# Patient Record
Sex: MALE | Race: WHITE | Employment: FULL TIME | URBAN - METROPOLITAN AREA
[De-identification: names, ages, dates, MRNs, and addresses within clinical notes are randomized per-mention and may not be internally consistent; named-entity substitution may affect disease eponyms.]

---

## 2017-01-09 ENCOUNTER — TELEPHONE (OUTPATIENT)
Dept: INTERNAL MEDICINE CLINIC | Age: 57
End: 2017-01-09

## 2017-01-10 ENCOUNTER — TELEPHONE (OUTPATIENT)
Dept: INTERNAL MEDICINE CLINIC | Age: 57
End: 2017-01-10

## 2017-01-10 DIAGNOSIS — R91.8 PULMONARY NODULES: Primary | ICD-10-CM

## 2017-01-11 LAB
BUN BLDV-MCNC: 14 MG/DL
CALCIUM SERPL-MCNC: 10.1 MG/DL
CHLORIDE BLD-SCNC: 97 MMOL/L
CO2: 26 MMOL/L
CREAT SERPL-MCNC: 0.84 MG/DL
GFR CALCULATED: 98
GLUCOSE BLD-MCNC: 286 MG/DL
HEMOGLOBIN: 16.5 G/DL (ref 13.5–17.5)
POTASSIUM SERPL-SCNC: 4.5 MMOL/L
SODIUM BLD-SCNC: 137 MMOL/L

## 2017-01-16 ENCOUNTER — INITIAL CONSULT (OUTPATIENT)
Dept: PULMONOLOGY | Age: 57
End: 2017-01-16

## 2017-01-16 VITALS
HEIGHT: 72 IN | SYSTOLIC BLOOD PRESSURE: 128 MMHG | HEART RATE: 91 BPM | DIASTOLIC BLOOD PRESSURE: 88 MMHG | OXYGEN SATURATION: 96 % | BODY MASS INDEX: 31.69 KG/M2 | WEIGHT: 234 LBS | RESPIRATION RATE: 18 BRPM

## 2017-01-16 DIAGNOSIS — Z80.1 FAMILY HISTORY OF LUNG CANCER: ICD-10-CM

## 2017-01-16 DIAGNOSIS — R91.8 MULTIPLE LUNG NODULES: Primary | ICD-10-CM

## 2017-01-16 DIAGNOSIS — R59.0 HILAR ADENOPATHY: ICD-10-CM

## 2017-01-16 LAB
DLCO %PRED: NORMAL
DLCO PRE: NORMAL
FEF 25-75%-POST: 1.47
FEF 25-75%-PRE: 3.17
FEV1-POST: 2.02
FEV1-PRE: 3.95
FEV1/FVC-POST: 47.6
FEV1/FVC-PRE: 74.1
FVC-POST: 4.24
FVC-PRE: 5.32
MEP: NORMAL
MIP: NORMAL
TLC %PRED: NORMAL
TLC PRE: NORMAL

## 2017-01-16 PROCEDURE — 99204 OFFICE O/P NEW MOD 45 MIN: CPT | Performed by: INTERNAL MEDICINE

## 2017-01-16 PROCEDURE — 94060 EVALUATION OF WHEEZING: CPT | Performed by: INTERNAL MEDICINE

## 2017-01-16 ASSESSMENT — PULMONARY FUNCTION TESTS
FEV1_POST: 2.02
FVC_POST: 4.24
FEV1/FVC_POST: 47.6
FEV1/FVC_PRE: 74.1
FEV1_PRE: 3.95
FVC_PRE: 5.32

## 2017-01-20 ENCOUNTER — TELEPHONE (OUTPATIENT)
Dept: INTERNAL MEDICINE CLINIC | Age: 57
End: 2017-01-20

## 2017-01-23 ENCOUNTER — TELEPHONE (OUTPATIENT)
Dept: PULMONOLOGY | Age: 57
End: 2017-01-23

## 2017-02-06 ENCOUNTER — OFFICE VISIT (OUTPATIENT)
Dept: INTERNAL MEDICINE CLINIC | Age: 57
End: 2017-02-06

## 2017-02-06 VITALS
DIASTOLIC BLOOD PRESSURE: 68 MMHG | WEIGHT: 233 LBS | HEART RATE: 76 BPM | SYSTOLIC BLOOD PRESSURE: 112 MMHG | BODY MASS INDEX: 31.6 KG/M2 | RESPIRATION RATE: 16 BRPM

## 2017-02-06 DIAGNOSIS — E11.9 TYPE 2 DIABETES MELLITUS WITHOUT COMPLICATION, WITHOUT LONG-TERM CURRENT USE OF INSULIN (HCC): ICD-10-CM

## 2017-02-06 DIAGNOSIS — M54.50 ACUTE RIGHT-SIDED LOW BACK PAIN WITHOUT SCIATICA: Primary | ICD-10-CM

## 2017-02-06 DIAGNOSIS — I10 ESSENTIAL HYPERTENSION: ICD-10-CM

## 2017-02-06 DIAGNOSIS — E78.2 MIXED HYPERLIPIDEMIA: ICD-10-CM

## 2017-02-06 PROCEDURE — 99213 OFFICE O/P EST LOW 20 MIN: CPT | Performed by: INTERNAL MEDICINE

## 2017-02-06 RX ORDER — SILDENAFIL 100 MG/1
100 TABLET, FILM COATED ORAL PRN
COMMUNITY
End: 2017-07-06 | Stop reason: SDUPTHER

## 2017-02-06 RX ORDER — CYCLOBENZAPRINE HCL 10 MG
10 TABLET ORAL 2 TIMES DAILY PRN
Qty: 30 TABLET | Refills: 0 | Status: SHIPPED | OUTPATIENT
Start: 2017-02-06 | End: 2018-08-10

## 2017-02-06 RX ORDER — MELOXICAM 15 MG/1
15 TABLET ORAL DAILY
Qty: 30 TABLET | Refills: 0 | Status: SHIPPED | OUTPATIENT
Start: 2017-02-06 | End: 2020-02-26

## 2017-02-20 RX ORDER — OMEPRAZOLE 40 MG/1
CAPSULE, DELAYED RELEASE ORAL
Qty: 30 CAPSULE | Refills: 10 | Status: SHIPPED | OUTPATIENT
Start: 2017-02-20 | End: 2018-03-02 | Stop reason: SDUPTHER

## 2017-05-17 ENCOUNTER — TELEPHONE (OUTPATIENT)
Dept: INTERNAL MEDICINE CLINIC | Age: 57
End: 2017-05-17

## 2017-05-18 RX ORDER — METHOCARBAMOL 750 MG/1
750 TABLET, FILM COATED ORAL 2 TIMES DAILY PRN
Qty: 50 TABLET | Refills: 2 | Status: SHIPPED | OUTPATIENT
Start: 2017-05-18 | End: 2019-02-04 | Stop reason: SDUPTHER

## 2017-07-06 RX ORDER — SILDENAFIL 100 MG/1
100 TABLET, FILM COATED ORAL PRN
Qty: 30 TABLET | Refills: 3 | Status: SHIPPED | OUTPATIENT
Start: 2017-07-06 | End: 2021-03-04

## 2017-07-10 RX ORDER — GLIMEPIRIDE 2 MG/1
TABLET ORAL
Qty: 30 TABLET | Refills: 10 | Status: SHIPPED | OUTPATIENT
Start: 2017-07-10 | End: 2018-07-03 | Stop reason: SDUPTHER

## 2017-07-10 RX ORDER — SITAGLIPTIN AND METFORMIN HYDROCHLORIDE 500; 50 MG/1; MG/1
TABLET, FILM COATED ORAL
Qty: 60 TABLET | Refills: 10 | Status: SHIPPED | OUTPATIENT
Start: 2017-07-10 | End: 2018-07-03 | Stop reason: SDUPTHER

## 2017-07-28 ENCOUNTER — TELEPHONE (OUTPATIENT)
Dept: INTERNAL MEDICINE CLINIC | Age: 57
End: 2017-07-28

## 2017-07-28 RX ORDER — METHYLPREDNISOLONE 4 MG/1
TABLET ORAL
Qty: 1 KIT | Refills: 0 | Status: SHIPPED | OUTPATIENT
Start: 2017-07-28 | End: 2017-08-03

## 2018-02-01 ENCOUNTER — OFFICE VISIT (OUTPATIENT)
Dept: INTERNAL MEDICINE CLINIC | Age: 58
End: 2018-02-01

## 2018-02-01 VITALS
HEART RATE: 80 BPM | WEIGHT: 235 LBS | SYSTOLIC BLOOD PRESSURE: 126 MMHG | BODY MASS INDEX: 31.87 KG/M2 | DIASTOLIC BLOOD PRESSURE: 72 MMHG

## 2018-02-01 DIAGNOSIS — E78.2 MIXED HYPERLIPIDEMIA: ICD-10-CM

## 2018-02-01 DIAGNOSIS — M54.2 NECK PAIN: Primary | ICD-10-CM

## 2018-02-01 DIAGNOSIS — I10 ESSENTIAL HYPERTENSION: ICD-10-CM

## 2018-02-01 DIAGNOSIS — E11.65 TYPE 2 DIABETES MELLITUS WITH HYPERGLYCEMIA, WITHOUT LONG-TERM CURRENT USE OF INSULIN (HCC): ICD-10-CM

## 2018-02-01 DIAGNOSIS — M54.12 RADICULOPATHY OF CERVICAL SPINE: ICD-10-CM

## 2018-02-01 DIAGNOSIS — R07.89 OTHER CHEST PAIN: ICD-10-CM

## 2018-02-01 PROCEDURE — 99213 OFFICE O/P EST LOW 20 MIN: CPT | Performed by: INTERNAL MEDICINE

## 2018-02-01 PROCEDURE — 93000 ELECTROCARDIOGRAM COMPLETE: CPT | Performed by: INTERNAL MEDICINE

## 2018-02-01 RX ORDER — CYCLOBENZAPRINE HCL 10 MG
TABLET ORAL
Qty: 30 TABLET | Refills: 5 | Status: SHIPPED | OUTPATIENT
Start: 2018-02-01 | End: 2019-08-16 | Stop reason: SDUPTHER

## 2018-02-09 DIAGNOSIS — I10 ESSENTIAL HYPERTENSION: ICD-10-CM

## 2018-02-09 DIAGNOSIS — E78.2 MIXED HYPERLIPIDEMIA: ICD-10-CM

## 2018-02-09 DIAGNOSIS — E11.65 TYPE 2 DIABETES MELLITUS WITH HYPERGLYCEMIA, WITHOUT LONG-TERM CURRENT USE OF INSULIN (HCC): ICD-10-CM

## 2018-02-09 LAB
ALBUMIN SERPL-MCNC: 4.5 G/DL (ref 3.4–5)
ALP BLD-CCNC: 92 U/L (ref 40–129)
ALT SERPL-CCNC: 23 U/L (ref 10–40)
ANION GAP SERPL CALCULATED.3IONS-SCNC: 12 MMOL/L (ref 3–16)
AST SERPL-CCNC: 13 U/L (ref 15–37)
BILIRUB SERPL-MCNC: 0.4 MG/DL (ref 0–1)
BILIRUBIN DIRECT: <0.2 MG/DL (ref 0–0.3)
BILIRUBIN, INDIRECT: ABNORMAL MG/DL (ref 0–1)
BUN BLDV-MCNC: 13 MG/DL (ref 7–20)
CALCIUM SERPL-MCNC: 9.4 MG/DL (ref 8.3–10.6)
CHLORIDE BLD-SCNC: 102 MMOL/L (ref 99–110)
CHOLESTEROL, TOTAL: 167 MG/DL (ref 0–199)
CO2: 27 MMOL/L (ref 21–32)
CREAT SERPL-MCNC: 0.7 MG/DL (ref 0.9–1.3)
GFR AFRICAN AMERICAN: >60
GFR NON-AFRICAN AMERICAN: >60
GLUCOSE BLD-MCNC: 319 MG/DL (ref 70–99)
HCT VFR BLD CALC: 46.9 % (ref 40.5–52.5)
HDLC SERPL-MCNC: 39 MG/DL (ref 40–60)
HEMOGLOBIN: 15.7 G/DL (ref 13.5–17.5)
LDL CHOLESTEROL CALCULATED: 108 MG/DL
MCH RBC QN AUTO: 32.1 PG (ref 26–34)
MCHC RBC AUTO-ENTMCNC: 33.5 G/DL (ref 31–36)
MCV RBC AUTO: 95.8 FL (ref 80–100)
PDW BLD-RTO: 13 % (ref 12.4–15.4)
PLATELET # BLD: 136 K/UL (ref 135–450)
PMV BLD AUTO: 9.2 FL (ref 5–10.5)
POTASSIUM SERPL-SCNC: 4.4 MMOL/L (ref 3.5–5.1)
RBC # BLD: 4.9 M/UL (ref 4.2–5.9)
SODIUM BLD-SCNC: 141 MMOL/L (ref 136–145)
TOTAL PROTEIN: 6.6 G/DL (ref 6.4–8.2)
TRIGL SERPL-MCNC: 100 MG/DL (ref 0–150)
VLDLC SERPL CALC-MCNC: 20 MG/DL
WBC # BLD: 9.1 K/UL (ref 4–11)

## 2018-02-10 LAB
ESTIMATED AVERAGE GLUCOSE: 300.6 MG/DL
HBA1C MFR BLD: 12.1 %

## 2018-02-13 NOTE — PROGRESS NOTES
S: Patient presents with problems of diabetes mellitus on oral agents, hypertension, and hyperlipidemia. He has not been following his diabetic diet. No always compliant with his medication. He presents with neck pain along with paresthesias of his right arm for several weeks. No exertional component. No fever or chills. No palpitations, dizziness, or syncope. No breathing difficulties. O:Blood pressure 126/72, pulse 80, weight 235 lb (106.6 kg). HEENT: TMs and canals were clear, oral pharynx clear      Neck: No palpable lymph nodes, normal thyroid examination.  Posterior neck muscles and right trapezius muscle tenderness and spasms       Lungs: clear      Cardio: reg pulse, no murmur, no carotid bruits      Ext: negative right wrist tinel sign, normal DTR of right arm        EKG: normal     A: Neck muscle spasms      Right arm paresthesias       Diabetes mellitus      Hypertension      hyperlipidemia    P: Cervical Spine Xray Series       Order for Basic chem Hgba1c Lipid Liver CBC & call results       Flexerial 10 mg qhs prn for muscle spasms      Cervical spine xrays given      Ice packs to neck prn       OV in 4 months

## 2018-04-02 ENCOUNTER — TELEPHONE (OUTPATIENT)
Dept: INTERNAL MEDICINE CLINIC | Age: 58
End: 2018-04-02

## 2018-04-02 RX ORDER — OSELTAMIVIR PHOSPHATE 75 MG/1
75 CAPSULE ORAL 2 TIMES DAILY
Qty: 10 CAPSULE | Refills: 0 | Status: SHIPPED | OUTPATIENT
Start: 2018-04-02 | End: 2018-04-07

## 2018-04-05 ENCOUNTER — TELEPHONE (OUTPATIENT)
Dept: INTERNAL MEDICINE CLINIC | Age: 58
End: 2018-04-05

## 2018-04-05 RX ORDER — METHYLPREDNISOLONE 4 MG/1
TABLET ORAL
Qty: 1 KIT | Refills: 0 | Status: SHIPPED | OUTPATIENT
Start: 2018-04-05 | End: 2018-04-11

## 2018-05-03 RX ORDER — LISINOPRIL 10 MG/1
TABLET ORAL
Qty: 30 TABLET | Refills: 6 | Status: SHIPPED | OUTPATIENT
Start: 2018-05-03 | End: 2018-10-31 | Stop reason: SDUPTHER

## 2018-07-03 RX ORDER — SITAGLIPTIN AND METFORMIN HYDROCHLORIDE 500; 50 MG/1; MG/1
TABLET, FILM COATED ORAL
Qty: 60 TABLET | Refills: 11 | Status: SHIPPED | OUTPATIENT
Start: 2018-07-03 | End: 2018-07-11 | Stop reason: CLARIF

## 2018-07-03 RX ORDER — GLIMEPIRIDE 2 MG/1
TABLET ORAL
Qty: 30 TABLET | Refills: 11 | Status: SHIPPED | OUTPATIENT
Start: 2018-07-03 | End: 2019-07-03 | Stop reason: SDUPTHER

## 2018-07-11 ENCOUNTER — OFFICE VISIT (OUTPATIENT)
Dept: INTERNAL MEDICINE CLINIC | Age: 58
End: 2018-07-11

## 2018-07-11 VITALS
BODY MASS INDEX: 29.29 KG/M2 | SYSTOLIC BLOOD PRESSURE: 118 MMHG | WEIGHT: 216 LBS | RESPIRATION RATE: 15 BRPM | HEART RATE: 68 BPM | DIASTOLIC BLOOD PRESSURE: 80 MMHG

## 2018-07-11 DIAGNOSIS — E78.2 MIXED HYPERLIPIDEMIA: ICD-10-CM

## 2018-07-11 DIAGNOSIS — R07.81 RIB PAIN: ICD-10-CM

## 2018-07-11 DIAGNOSIS — I10 ESSENTIAL HYPERTENSION: ICD-10-CM

## 2018-07-11 DIAGNOSIS — E11.65 TYPE 2 DIABETES MELLITUS WITH HYPERGLYCEMIA, WITHOUT LONG-TERM CURRENT USE OF INSULIN (HCC): Primary | ICD-10-CM

## 2018-07-11 PROCEDURE — 99213 OFFICE O/P EST LOW 20 MIN: CPT | Performed by: INTERNAL MEDICINE

## 2018-07-11 ASSESSMENT — PATIENT HEALTH QUESTIONNAIRE - PHQ9
1. LITTLE INTEREST OR PLEASURE IN DOING THINGS: 0
SUM OF ALL RESPONSES TO PHQ9 QUESTIONS 1 & 2: 0
2. FEELING DOWN, DEPRESSED OR HOPELESS: 0
SUM OF ALL RESPONSES TO PHQ QUESTIONS 1-9: 0

## 2018-07-15 NOTE — PROGRESS NOTES
S: Patient presents with problems of diabetes mellitus on oral agents, hypertension, and hyperlipidemia. He has not been following his diabetic diet and not  always compliant with his medication. He has been having polyuria and polydipsia. He is not monitoring his glucoses. He has been prescribed Amaryl qam and Janumet  1 bid. He recently had an injury to his right ribs and now with persistent point tenderness. No exertional chest pain or breathing difficulties. No fever or chills. No orthostatic symptoms. O:Blood pressure 118/80, pulse 68, resp. rate 15, weight 216 lb (98 kg). HEENT: TMs and canals were clear, oral pharynx clear      Neck: No palpable lymph nodes, normal thyroid examination.        Lungs: clear      Cardio: reg pulse, no murmur, no carotid bruits      Chest: tenderness over right lateral lower axillary line      Ext: no edema          LABS: from 2/9/18 Lytes normal, BUN 13 Creat 0.7, Glucose 319, Hgba1c 12.1%, CBC & Liver normal,  HDL 39 Trig 100    A: right lower rib pain       Diabetes mellitus not controlled      Hypertension controlled      Hyperlipidemia     P: Change Janumet to  one tablet bid with a meal #60 RX5      Continue Amaryl 2 mg qam      Intensify diabetic diet compliance      Right rib xray series and call results      Return in one month with basic chem and Hgba1c

## 2018-07-30 DIAGNOSIS — E78.2 MIXED HYPERLIPIDEMIA: ICD-10-CM

## 2018-07-30 DIAGNOSIS — I10 ESSENTIAL HYPERTENSION: ICD-10-CM

## 2018-07-30 DIAGNOSIS — E11.65 TYPE 2 DIABETES MELLITUS WITH HYPERGLYCEMIA, WITHOUT LONG-TERM CURRENT USE OF INSULIN (HCC): Primary | ICD-10-CM

## 2018-08-03 DIAGNOSIS — E78.2 MIXED HYPERLIPIDEMIA: ICD-10-CM

## 2018-08-03 DIAGNOSIS — I10 ESSENTIAL HYPERTENSION: ICD-10-CM

## 2018-08-03 DIAGNOSIS — E11.65 TYPE 2 DIABETES MELLITUS WITH HYPERGLYCEMIA, WITHOUT LONG-TERM CURRENT USE OF INSULIN (HCC): ICD-10-CM

## 2018-08-03 LAB
ANION GAP SERPL CALCULATED.3IONS-SCNC: 12 MMOL/L (ref 3–16)
BUN BLDV-MCNC: 15 MG/DL (ref 7–20)
CALCIUM SERPL-MCNC: 9.3 MG/DL (ref 8.3–10.6)
CHLORIDE BLD-SCNC: 104 MMOL/L (ref 99–110)
CO2: 26 MMOL/L (ref 21–32)
CREAT SERPL-MCNC: 0.6 MG/DL (ref 0.9–1.3)
GFR AFRICAN AMERICAN: >60
GFR NON-AFRICAN AMERICAN: >60
GLUCOSE BLD-MCNC: 261 MG/DL (ref 70–99)
POTASSIUM SERPL-SCNC: 4.5 MMOL/L (ref 3.5–5.1)
SODIUM BLD-SCNC: 142 MMOL/L (ref 136–145)

## 2018-08-04 LAB
ESTIMATED AVERAGE GLUCOSE: 297.7 MG/DL
HBA1C MFR BLD: 12 %

## 2018-08-10 ENCOUNTER — OFFICE VISIT (OUTPATIENT)
Dept: INTERNAL MEDICINE CLINIC | Age: 58
End: 2018-08-10

## 2018-08-10 VITALS
DIASTOLIC BLOOD PRESSURE: 64 MMHG | WEIGHT: 220 LBS | HEIGHT: 73 IN | SYSTOLIC BLOOD PRESSURE: 106 MMHG | HEART RATE: 76 BPM | BODY MASS INDEX: 29.16 KG/M2

## 2018-08-10 DIAGNOSIS — Z00.00 ANNUAL PHYSICAL EXAM: Primary | ICD-10-CM

## 2018-08-10 DIAGNOSIS — E55.9 VITAMIN D DEFICIENCY: ICD-10-CM

## 2018-08-10 DIAGNOSIS — E11.65 TYPE 2 DIABETES MELLITUS WITH HYPERGLYCEMIA, WITHOUT LONG-TERM CURRENT USE OF INSULIN (HCC): Primary | ICD-10-CM

## 2018-08-10 DIAGNOSIS — Z23 NEED FOR VACCINATION FOR PNEUMOCOCCUS: ICD-10-CM

## 2018-08-10 DIAGNOSIS — I10 ESSENTIAL HYPERTENSION: ICD-10-CM

## 2018-08-10 DIAGNOSIS — R10.11 RUQ ABDOMINAL PAIN: ICD-10-CM

## 2018-08-10 PROCEDURE — 90732 PPSV23 VACC 2 YRS+ SUBQ/IM: CPT | Performed by: INTERNAL MEDICINE

## 2018-08-10 PROCEDURE — 90471 IMMUNIZATION ADMIN: CPT | Performed by: INTERNAL MEDICINE

## 2018-08-10 PROCEDURE — 99213 OFFICE O/P EST LOW 20 MIN: CPT | Performed by: INTERNAL MEDICINE

## 2018-08-10 NOTE — PROGRESS NOTES
S: Patient presents with problems of diabetes mellitus on oral agents, hypertension, and hyperlipidemia. On the last OV he was changed from Metformin bid to Janumet 50/1000 bid but did not start this. He wanted to finish his Metformin. He continues his Amaryl 2 mg qam. He did not do his right rib xray as he lost the order. Still with Right Rib pain. No fever or chills. Also now with RUQ abdominal pain. No nausea or vomiting. O:Blood pressure 106/64, pulse 76, height 6' 1\" (1.854 m), weight 220 lb (99.8 kg). HEENT: TMs and canals were clear, oral pharynx clear      Neck: No palpable lymph nodes, normal thyroid examination.        Lungs: clear      Cardio: reg pulse, no murmur, no carotid bruits      Chest: tenderness over right lateral lower axillary line      Abd: mild RUQ tenderness without rebound or guarding      Ext: no edema, normal light touch of feet           LABS: from 8/3/18 Lytes normal, BUN 15 Creat 0.6, Glucose 261, Hgba1c 12.0%    A: right lower rib pain       RUQ abdominal pain      Diabetes mellitus not controlled      Hypertension controlled    P: start the Janumet to  one tablet bid with a meal      Continue Amaryl 2 mg qam      Intensify diabetic diet compliance      Right rib xray series and GB ultrasound      Pneumovax-23 immunization given      OV in 2M for wellness exam

## 2018-08-22 ENCOUNTER — HOSPITAL ENCOUNTER (OUTPATIENT)
Dept: GENERAL RADIOLOGY | Age: 58
Discharge: HOME | End: 2018-08-22
Attending: INTERNAL MEDICINE | Admitting: INTERNAL MEDICINE

## 2018-08-22 DIAGNOSIS — R07.81 RIB PAIN: ICD-10-CM

## 2018-08-28 ENCOUNTER — TELEPHONE (OUTPATIENT)
Dept: INTERNAL MEDICINE CLINIC | Age: 58
End: 2018-08-28

## 2018-09-27 DIAGNOSIS — E55.9 VITAMIN D DEFICIENCY: ICD-10-CM

## 2018-09-27 DIAGNOSIS — Z00.00 ANNUAL PHYSICAL EXAM: ICD-10-CM

## 2018-09-27 LAB
A/G RATIO: 2.7 (ref 1.1–2.2)
ALBUMIN SERPL-MCNC: 4.8 G/DL (ref 3.4–5)
ALP BLD-CCNC: 90 U/L (ref 40–129)
ALT SERPL-CCNC: 22 U/L (ref 10–40)
ANION GAP SERPL CALCULATED.3IONS-SCNC: 11 MMOL/L (ref 3–16)
AST SERPL-CCNC: 14 U/L (ref 15–37)
BASOPHILS ABSOLUTE: 0.1 K/UL (ref 0–0.2)
BASOPHILS RELATIVE PERCENT: 0.9 %
BILIRUB SERPL-MCNC: 0.4 MG/DL (ref 0–1)
BILIRUBIN URINE: NEGATIVE
BLOOD, URINE: NEGATIVE
BUN BLDV-MCNC: 14 MG/DL (ref 7–20)
CALCIUM SERPL-MCNC: 9.7 MG/DL (ref 8.3–10.6)
CHLORIDE BLD-SCNC: 102 MMOL/L (ref 99–110)
CHOLESTEROL, TOTAL: 147 MG/DL (ref 0–199)
CLARITY: CLEAR
CO2: 27 MMOL/L (ref 21–32)
COLOR: YELLOW
CREAT SERPL-MCNC: 0.7 MG/DL (ref 0.9–1.3)
CREATININE URINE: 125.8 MG/DL (ref 39–259)
EOSINOPHILS ABSOLUTE: 0.1 K/UL (ref 0–0.6)
EOSINOPHILS RELATIVE PERCENT: 0.7 %
EPITHELIAL CELLS, UA: 0 /HPF (ref 0–5)
GFR AFRICAN AMERICAN: >60
GFR NON-AFRICAN AMERICAN: >60
GLOBULIN: 1.8 G/DL
GLUCOSE BLD-MCNC: 213 MG/DL (ref 70–99)
GLUCOSE URINE: 250 MG/DL
HCT VFR BLD CALC: 44.7 % (ref 40.5–52.5)
HDLC SERPL-MCNC: 42 MG/DL (ref 40–60)
HEMOGLOBIN: 15.1 G/DL (ref 13.5–17.5)
HYALINE CASTS: 0 /LPF (ref 0–8)
KETONES, URINE: NEGATIVE MG/DL
LDL CHOLESTEROL CALCULATED: 92 MG/DL
LEUKOCYTE ESTERASE, URINE: NEGATIVE
LYMPHOCYTES ABSOLUTE: 2.8 K/UL (ref 1–5.1)
LYMPHOCYTES RELATIVE PERCENT: 24.4 %
MCH RBC QN AUTO: 32.4 PG (ref 26–34)
MCHC RBC AUTO-ENTMCNC: 33.8 G/DL (ref 31–36)
MCV RBC AUTO: 95.9 FL (ref 80–100)
MICROALBUMIN UR-MCNC: <1.2 MG/DL
MICROALBUMIN/CREAT UR-RTO: NORMAL MG/G (ref 0–30)
MICROSCOPIC EXAMINATION: ABNORMAL
MONOCYTES ABSOLUTE: 0.9 K/UL (ref 0–1.3)
MONOCYTES RELATIVE PERCENT: 8 %
NEUTROPHILS ABSOLUTE: 7.7 K/UL (ref 1.7–7.7)
NEUTROPHILS RELATIVE PERCENT: 66 %
NITRITE, URINE: NEGATIVE
PDW BLD-RTO: 12.9 % (ref 12.4–15.4)
PH UA: 6
PLATELET # BLD: 138 K/UL (ref 135–450)
PMV BLD AUTO: 8.9 FL (ref 5–10.5)
POTASSIUM SERPL-SCNC: 4.5 MMOL/L (ref 3.5–5.1)
PROSTATE SPECIFIC ANTIGEN: 0.99 NG/ML (ref 0–4)
PROTEIN UA: NEGATIVE MG/DL
RBC # BLD: 4.67 M/UL (ref 4.2–5.9)
RBC UA: 2 /HPF (ref 0–4)
SODIUM BLD-SCNC: 140 MMOL/L (ref 136–145)
SPECIFIC GRAVITY UA: 1.03
TOTAL PROTEIN: 6.6 G/DL (ref 6.4–8.2)
TRIGL SERPL-MCNC: 64 MG/DL (ref 0–150)
TSH SERPL DL<=0.05 MIU/L-ACNC: 1.33 UIU/ML (ref 0.27–4.2)
UROBILINOGEN, URINE: 0.2 E.U./DL
VITAMIN D 25-HYDROXY: 27.1 NG/ML
VLDLC SERPL CALC-MCNC: 13 MG/DL
WBC # BLD: 11.6 K/UL (ref 4–11)
WBC UA: 1 /HPF (ref 0–5)

## 2018-09-28 LAB
ESTIMATED AVERAGE GLUCOSE: 251.8 MG/DL
HBA1C MFR BLD: 10.4 %

## 2018-10-11 ENCOUNTER — OFFICE VISIT (OUTPATIENT)
Dept: INTERNAL MEDICINE CLINIC | Age: 58
End: 2018-10-11
Payer: COMMERCIAL

## 2018-10-11 VITALS
HEART RATE: 76 BPM | RESPIRATION RATE: 16 BRPM | OXYGEN SATURATION: 98 % | SYSTOLIC BLOOD PRESSURE: 128 MMHG | WEIGHT: 218 LBS | HEIGHT: 71 IN | DIASTOLIC BLOOD PRESSURE: 80 MMHG | BODY MASS INDEX: 30.52 KG/M2

## 2018-10-11 DIAGNOSIS — R00.2 PALPITATIONS: ICD-10-CM

## 2018-10-11 DIAGNOSIS — Z00.00 ANNUAL PHYSICAL EXAM: Primary | ICD-10-CM

## 2018-10-11 DIAGNOSIS — Z13.6 SCREENING FOR HEART DISEASE: ICD-10-CM

## 2018-10-11 DIAGNOSIS — N52.9 ERECTILE DYSFUNCTION, UNSPECIFIED ERECTILE DYSFUNCTION TYPE: ICD-10-CM

## 2018-10-11 PROCEDURE — 93000 ELECTROCARDIOGRAM COMPLETE: CPT | Performed by: INTERNAL MEDICINE

## 2018-10-11 PROCEDURE — 99396 PREV VISIT EST AGE 40-64: CPT | Performed by: INTERNAL MEDICINE

## 2018-10-12 ENCOUNTER — TELEPHONE (OUTPATIENT)
Dept: INTERNAL MEDICINE CLINIC | Age: 58
End: 2018-10-12

## 2018-10-12 RX ORDER — MECLIZINE HYDROCHLORIDE 25 MG/1
25 TABLET ORAL 3 TIMES DAILY PRN
Qty: 45 TABLET | Refills: 1 | Status: SHIPPED | OUTPATIENT
Start: 2018-10-12 | End: 2018-10-31 | Stop reason: SDUPTHER

## 2018-10-12 NOTE — TELEPHONE ENCOUNTER
Patients wife left message that her  saw you yesterday and told you he has been having dizziness and was nauseated and you were going to call in something for him?  Please call

## 2018-10-21 NOTE — PROGRESS NOTES
times daily as needed for Dizziness or Nausea. 30 tablet 1    Cholecalciferol (VITAMIN D3) 2000 UNITS CAPS Take  by mouth daily.  loratadine (CLARITIN) 10 MG tablet Take 10 mg by mouth as needed.  meclizine (ANTIVERT) 25 MG tablet Take 1 tablet by mouth 3 times daily as needed for Dizziness or Nausea 45 tablet 1     No current facility-administered medications for this visit. ALL: none known, intolerance to Lansoprazole    SH:  No smoking history, has an occasional beer        ROS: General:   No weight loss, anorexia, fever, chills, or night sweats. No weakness episodes. HEENT:    No headache, voice change, hoarseness, or swallowing difficulties. No visual disturbance or loss of vision. Resp:       No wheezing, coughing, or hemoptysis            CV:           No rest or exertional chest pain. No orthopnea or PND. No syncope. GI:            No abdominal pain, change in bowel habits, hematochezia, or melanotic stool. :          No frequency, urgency, dysuria, or hematuria. Neuro:     No symptoms of cranial nerve dysfunction, gait difficulties, or extremity weakness. Immun:    Up to date on TDap & pneumovax. Declines the influenza. PE:    Vitals:      Blood pressure 128/80, pulse 76, resp. rate 16, height 5' 11\" (1.803 m), weight 218 lb (98.9 kg), SpO2 98 %. GEN:       No acute distress, alert and oriented x3            HENNT:  TMs and auditory canals are clear. Pupils equal and reactive to light. EOMs intact. Fundi are clear and discs are flat. Oropharynx is clear. No facial rash or lesions. NECK:     Supple without palpable lymph nodes. Thyroid exam is normal.            LUNGS:   Clear to auscultation. CV:          Regular pulse. Normal S1 & S2. No murmur. No carotid bruits.

## 2018-11-01 RX ORDER — MECLIZINE HYDROCHLORIDE 25 MG/1
TABLET ORAL
Qty: 45 TABLET | Refills: 1 | Status: ON HOLD | OUTPATIENT
Start: 2018-11-01 | End: 2020-08-16 | Stop reason: HOSPADM

## 2018-11-01 RX ORDER — LISINOPRIL 10 MG/1
TABLET ORAL
Qty: 30 TABLET | Refills: 6 | Status: SHIPPED | OUTPATIENT
Start: 2018-11-01 | End: 2019-06-15 | Stop reason: SDUPTHER

## 2018-11-28 DIAGNOSIS — E11.65 TYPE 2 DIABETES MELLITUS WITH HYPERGLYCEMIA, WITHOUT LONG-TERM CURRENT USE OF INSULIN (HCC): Primary | ICD-10-CM

## 2018-11-28 DIAGNOSIS — I10 ESSENTIAL HYPERTENSION: ICD-10-CM

## 2018-11-28 DIAGNOSIS — E78.2 MIXED HYPERLIPIDEMIA: ICD-10-CM

## 2019-02-11 RX ORDER — SITAGLIPTIN AND METFORMIN HYDROCHLORIDE 1000; 50 MG/1; MG/1
1 TABLET, FILM COATED ORAL 2 TIMES DAILY WITH MEALS
Qty: 60 TABLET | Refills: 5 | Status: SHIPPED | OUTPATIENT
Start: 2019-02-11 | End: 2019-08-09 | Stop reason: SDUPTHER

## 2019-03-01 RX ORDER — OMEPRAZOLE 40 MG/1
CAPSULE, DELAYED RELEASE ORAL
Qty: 30 CAPSULE | Refills: 5 | Status: SHIPPED | OUTPATIENT
Start: 2019-03-01 | End: 2019-08-30 | Stop reason: SDUPTHER

## 2019-06-17 RX ORDER — LISINOPRIL 10 MG/1
TABLET ORAL
Qty: 30 TABLET | Refills: 6 | Status: SHIPPED | OUTPATIENT
Start: 2019-06-17 | End: 2020-01-10

## 2019-08-12 RX ORDER — SITAGLIPTIN AND METFORMIN HYDROCHLORIDE 1000; 50 MG/1; MG/1
1 TABLET, FILM COATED ORAL 2 TIMES DAILY WITH MEALS
Qty: 60 TABLET | Refills: 5 | Status: SHIPPED | OUTPATIENT
Start: 2019-08-12 | End: 2020-02-14

## 2019-08-16 RX ORDER — CYCLOBENZAPRINE HCL 10 MG
TABLET ORAL
Qty: 30 TABLET | Refills: 11 | Status: SHIPPED | OUTPATIENT
Start: 2019-08-16 | End: 2020-07-27

## 2019-08-30 RX ORDER — OMEPRAZOLE 40 MG/1
CAPSULE, DELAYED RELEASE ORAL
Qty: 30 CAPSULE | Refills: 5 | Status: SHIPPED | OUTPATIENT
Start: 2019-08-30 | End: 2020-04-08 | Stop reason: SDUPTHER

## 2019-12-26 RX ORDER — GLIMEPIRIDE 2 MG/1
TABLET ORAL
Qty: 30 TABLET | Refills: 5 | Status: SHIPPED | OUTPATIENT
Start: 2019-12-26 | End: 2020-03-01

## 2020-01-10 RX ORDER — LISINOPRIL 10 MG/1
TABLET ORAL
Qty: 30 TABLET | Refills: 6 | Status: SHIPPED | OUTPATIENT
Start: 2020-01-10 | End: 2020-08-12

## 2020-02-14 RX ORDER — SITAGLIPTIN AND METFORMIN HYDROCHLORIDE 1000; 50 MG/1; MG/1
1 TABLET, FILM COATED ORAL 2 TIMES DAILY WITH MEALS
Qty: 60 TABLET | Refills: 5 | Status: SHIPPED | OUTPATIENT
Start: 2020-02-14 | End: 2020-08-21

## 2020-02-26 ENCOUNTER — OFFICE VISIT (OUTPATIENT)
Dept: INTERNAL MEDICINE CLINIC | Age: 60
End: 2020-02-26
Payer: COMMERCIAL

## 2020-02-26 VITALS
HEART RATE: 93 BPM | WEIGHT: 214.8 LBS | SYSTOLIC BLOOD PRESSURE: 118 MMHG | HEIGHT: 71 IN | OXYGEN SATURATION: 97 % | DIASTOLIC BLOOD PRESSURE: 90 MMHG | BODY MASS INDEX: 30.07 KG/M2

## 2020-02-26 PROBLEM — N48.6 PEYRONIE DISEASE: Status: ACTIVE | Noted: 2020-02-26

## 2020-02-26 LAB
A/G RATIO: 2.1 (ref 1.1–2.2)
ALBUMIN SERPL-MCNC: 4.9 G/DL (ref 3.4–5)
ALP BLD-CCNC: 107 U/L (ref 40–129)
ALT SERPL-CCNC: 23 U/L (ref 10–40)
ANION GAP SERPL CALCULATED.3IONS-SCNC: 17 MMOL/L (ref 3–16)
AST SERPL-CCNC: 13 U/L (ref 15–37)
BASOPHILS ABSOLUTE: 0.1 K/UL (ref 0–0.2)
BASOPHILS RELATIVE PERCENT: 0.4 %
BILIRUB SERPL-MCNC: 0.4 MG/DL (ref 0–1)
BILIRUBIN URINE: NEGATIVE
BLOOD, URINE: NEGATIVE
BUN BLDV-MCNC: 13 MG/DL (ref 7–20)
CALCIUM SERPL-MCNC: 9.6 MG/DL (ref 8.3–10.6)
CHLORIDE BLD-SCNC: 96 MMOL/L (ref 99–110)
CHOLESTEROL, FASTING: 166 MG/DL (ref 0–199)
CLARITY: CLEAR
CO2: 23 MMOL/L (ref 21–32)
COLOR: YELLOW
CREAT SERPL-MCNC: 0.6 MG/DL (ref 0.9–1.3)
CREATININE URINE: 101.1 MG/DL (ref 39–259)
EOSINOPHILS ABSOLUTE: 0.1 K/UL (ref 0–0.6)
EOSINOPHILS RELATIVE PERCENT: 0.7 %
GFR AFRICAN AMERICAN: >60
GFR NON-AFRICAN AMERICAN: >60
GLOBULIN: 2.3 G/DL
GLUCOSE BLD-MCNC: 308 MG/DL (ref 70–99)
GLUCOSE URINE: >=1000 MG/DL
HCT VFR BLD CALC: 48.5 % (ref 40.5–52.5)
HDLC SERPL-MCNC: 41 MG/DL (ref 40–60)
HEMOGLOBIN: 16.4 G/DL (ref 13.5–17.5)
KETONES, URINE: ABNORMAL MG/DL
LDL CHOLESTEROL CALCULATED: 95 MG/DL
LEUKOCYTE ESTERASE, URINE: NEGATIVE
LYMPHOCYTES ABSOLUTE: 3.8 K/UL (ref 1–5.1)
LYMPHOCYTES RELATIVE PERCENT: 27.5 %
MCH RBC QN AUTO: 32 PG (ref 26–34)
MCHC RBC AUTO-ENTMCNC: 33.8 G/DL (ref 31–36)
MCV RBC AUTO: 94.8 FL (ref 80–100)
MICROALBUMIN UR-MCNC: 3.8 MG/DL
MICROALBUMIN/CREAT UR-RTO: 37.6 MG/G (ref 0–30)
MICROSCOPIC EXAMINATION: ABNORMAL
MONOCYTES ABSOLUTE: 1.2 K/UL (ref 0–1.3)
MONOCYTES RELATIVE PERCENT: 8.7 %
NEUTROPHILS ABSOLUTE: 8.6 K/UL (ref 1.7–7.7)
NEUTROPHILS RELATIVE PERCENT: 62.7 %
NITRITE, URINE: NEGATIVE
PDW BLD-RTO: 12.9 % (ref 12.4–15.4)
PH UA: 5.5 (ref 5–8)
PLATELET # BLD: 155 K/UL (ref 135–450)
PMV BLD AUTO: 8.9 FL (ref 5–10.5)
POTASSIUM SERPL-SCNC: 4.4 MMOL/L (ref 3.5–5.1)
PROTEIN UA: NEGATIVE MG/DL
RBC # BLD: 5.11 M/UL (ref 4.2–5.9)
SODIUM BLD-SCNC: 136 MMOL/L (ref 136–145)
SPECIFIC GRAVITY UA: >1.03 (ref 1–1.03)
TOTAL PROTEIN: 7.2 G/DL (ref 6.4–8.2)
TRIGLYCERIDE, FASTING: 151 MG/DL (ref 0–150)
URINE REFLEX TO CULTURE: ABNORMAL
URINE TYPE: ABNORMAL
UROBILINOGEN, URINE: 0.2 E.U./DL
VITAMIN D 25-HYDROXY: 20.5 NG/ML
VLDLC SERPL CALC-MCNC: 30 MG/DL
WBC # BLD: 13.8 K/UL (ref 4–11)

## 2020-02-26 PROCEDURE — 81003 URINALYSIS AUTO W/O SCOPE: CPT | Performed by: FAMILY MEDICINE

## 2020-02-26 PROCEDURE — 99204 OFFICE O/P NEW MOD 45 MIN: CPT | Performed by: FAMILY MEDICINE

## 2020-02-26 ASSESSMENT — PATIENT HEALTH QUESTIONNAIRE - PHQ9
2. FEELING DOWN, DEPRESSED OR HOPELESS: 0
SUM OF ALL RESPONSES TO PHQ9 QUESTIONS 1 & 2: 0
SUM OF ALL RESPONSES TO PHQ QUESTIONS 1-9: 0
1. LITTLE INTEREST OR PLEASURE IN DOING THINGS: 0
SUM OF ALL RESPONSES TO PHQ QUESTIONS 1-9: 0

## 2020-02-26 NOTE — PROGRESS NOTES
Normal heart sounds. Pulmonary:      Effort: Pulmonary effort is normal.      Breath sounds: Normal breath sounds. Abdominal:      General: Bowel sounds are normal. There is no distension. Palpations: Abdomen is soft. Tenderness: There is no abdominal tenderness. There is no guarding. Musculoskeletal: Normal range of motion. Right lower leg: No edema. Left lower leg: No edema. Comments: 5-5 muscular strength throughout and bilateral.   Skin:     General: Skin is warm and dry. Neurological:      General: No focal deficit present. Mental Status: He is alert and oriented to person, place, and time. Psychiatric:         Mood and Affect: Mood normal.         Behavior: Behavior normal.         Thought Content: Thought content normal.         Judgment: Judgment normal.            Assessment / Plan:      1. Type 2 diabetes mellitus without complication, without long-term current use of insulin ([facility])  - Patient is currently taking Janumet and glimepiride.  - Discontinued patient medication glimepiride and start the patient on maintenance insulin Basaglar 15 units nightly. - Comprehensive Metabolic Panel  - Hemoglobin A1C  - Urinalysis Reflex to Culture  - MICROALBUMIN / CREATININE URINE RATIO  - insulin glargine (BASAGLAR KWIKPEN) 100 UNIT/ML injection pen; Inject 15 Units into the skin nightly    2. Essential hypertension  - Stable on home medication, lisinopril. 3. Peyronie disease  - Patient provided urology referral.  - Keya Rodriguez MD, Urology, [place]    4. Gastroesophageal reflux disease without esophagitis  - Stable on home medication, omeprazole. - Recommended PPI holiday. - CBC Auto Differential    5. Mixed hyperlipidemia  - Patient is currently not on any statin. Patient last HDL was 42 and LDL was 92. - Lipid, Fasting    6. Vitamin D deficiency  - Patient currently on vitamin D3 supplement. - Vitamin D 25 Hydroxy    7.  Encounter to establish care

## 2020-02-27 LAB
ESTIMATED AVERAGE GLUCOSE: 280.5 MG/DL
HBA1C MFR BLD: 11.4 %

## 2020-02-27 ASSESSMENT — ENCOUNTER SYMPTOMS
COUGH: 0
TROUBLE SWALLOWING: 0
BACK PAIN: 0
CONSTIPATION: 0
ABDOMINAL PAIN: 0
ABDOMINAL DISTENTION: 0
SINUS PAIN: 0
WHEEZING: 0
NAUSEA: 0
DIARRHEA: 0
CHEST TIGHTNESS: 0
EYE REDNESS: 0
SHORTNESS OF BREATH: 0
EYE ITCHING: 0
SORE THROAT: 0
VOMITING: 0

## 2020-03-01 PROBLEM — K21.9 GASTROESOPHAGEAL REFLUX DISEASE WITHOUT ESOPHAGITIS: Status: ACTIVE | Noted: 2020-03-01

## 2020-03-09 RX ORDER — PEN NEEDLE, DIABETIC 31 GX5/16"
1 NEEDLE, DISPOSABLE MISCELLANEOUS DAILY
Qty: 100 EACH | Refills: 3 | Status: SHIPPED | OUTPATIENT
Start: 2020-03-09 | End: 2021-03-01

## 2020-04-08 RX ORDER — OMEPRAZOLE 40 MG/1
CAPSULE, DELAYED RELEASE ORAL
Qty: 30 CAPSULE | Refills: 11 | Status: SHIPPED | OUTPATIENT
Start: 2020-04-08 | End: 2020-12-30 | Stop reason: SDUPTHER

## 2020-07-14 RX ORDER — GLIMEPIRIDE 2 MG/1
TABLET ORAL
Qty: 30 TABLET | Refills: 5 | OUTPATIENT
Start: 2020-07-14

## 2020-07-27 ENCOUNTER — OFFICE VISIT (OUTPATIENT)
Dept: INTERNAL MEDICINE CLINIC | Age: 60
End: 2020-07-27
Payer: COMMERCIAL

## 2020-07-27 VITALS — WEIGHT: 226.4 LBS | HEART RATE: 75 BPM | TEMPERATURE: 98.2 F | OXYGEN SATURATION: 97 % | BODY MASS INDEX: 31.58 KG/M2

## 2020-07-27 PROBLEM — R07.81 RIB PAIN ON RIGHT SIDE: Status: ACTIVE | Noted: 2020-07-27

## 2020-07-27 PROBLEM — M99.08 SOMATIC DYSFUNCTION OF RIB CAGE REGION: Status: ACTIVE | Noted: 2020-07-27

## 2020-07-27 PROBLEM — M99.09 SOMATIC DYSFUNCTION OF BACK: Status: ACTIVE | Noted: 2020-07-27

## 2020-07-27 PROCEDURE — 98925 OSTEOPATH MANJ 1-2 REGIONS: CPT | Performed by: FAMILY MEDICINE

## 2020-07-27 PROCEDURE — 99213 OFFICE O/P EST LOW 20 MIN: CPT | Performed by: FAMILY MEDICINE

## 2020-07-27 RX ORDER — PREDNISONE 10 MG/1
10 TABLET ORAL DAILY
Qty: 10 TABLET | Refills: 0 | Status: SHIPPED | OUTPATIENT
Start: 2020-07-27 | End: 2020-08-06

## 2020-07-27 ASSESSMENT — ENCOUNTER SYMPTOMS
SINUS PAIN: 0
VOMITING: 0
EYE ITCHING: 0
SORE THROAT: 0
ABDOMINAL DISTENTION: 0
BACK PAIN: 0
NAUSEA: 0
SHORTNESS OF BREATH: 0
DIARRHEA: 0
WHEEZING: 0
TROUBLE SWALLOWING: 0
CONSTIPATION: 0
EYE REDNESS: 0
ABDOMINAL PAIN: 0
COUGH: 0
CHEST TIGHTNESS: 0

## 2020-07-27 NOTE — PROGRESS NOTES
Palpations: Abdomen is soft. Tenderness: There is no abdominal tenderness. There is no guarding. Hernia: No hernia is present. Comments: Soft, no hepatosplenomegaly   Musculoskeletal:      Right lower leg: No edema. Left lower leg: No edema. Comments: 5-5 muscular strength throughout and bilateral.  Right multiple lateral and posterior rib cage pain and tenderness mostly   Lymphadenopathy:      Cervical: No cervical adenopathy. Skin:     General: Skin is warm and dry. Findings: No rash. Neurological:      General: No focal deficit present. Mental Status: He is alert and oriented to person, place, and time. Sensory: No sensory deficit. Motor: No weakness. Psychiatric:         Mood and Affect: Mood normal.         Behavior: Behavior normal.         Thought Content: Thought content normal.         Judgment: Judgment normal.     Somatic Findings:   Right-first through ninth rib inhalation dysfunction  Right- extension dysfunction of T2-T9       Assessment / Plan:      1. Rib pain on right side  - Post OMT, patient has reduced pain and he was able to lie on his right side. Patient was advised to continue home stretching exercises and follow-up as needed for pain. - predniSONE (DELTASONE) 10 MG tablet; Take 1 tablet by mouth daily for 10 days  Dispense: 10 tablet; Refill: 0  - diclofenac sodium (VOLTAREN) 1 % GEL; Apply 2 g topically 4 times daily  Dispense: 1 Tube; Refill: 0  - SC OSTEOPATHIC MANIP,1-2 BODY REGN    Procedure Note:  The patient verbally consented to the application of OMT. Patient was positioned appropriately for the techniques and repositioned as needed. Appropriate hand positioning and monitoring technique was performed. Reassessment of the effectiveness of the techniques was performed.     2. Somatic dysfunction of rib cage region  -Muscle energy is given to rib 1 through 9  - Reassessment: Normal respiration with reduced work of breathing  - Need f/u OMT for further improvement. Drink plenty of fluid and home stretching exercises. - MD OSTEOPATHIC MANIP,1-2 BODY REGN    3. Somatic dysfunction of back  - Taught para-spinal muscles of thoracic region  - TART changes from T1 -T9  - Applied myofacial to the para- spinal muscles of the thoracic region. Reassessment: Improvement in the softening of the para-spinal muscles of the thoracic region  - Need f/u OMT for further improvement. Drink plenty of fluid and home stretching exercises  - MD OSTEOPATHIC MANIP,1-2 BODY REGN          Note:   Patient understand the assessment and agreed to the plan. Medication side effects was discussed during the encounter. Before discharging the patient, all questions and concern were addressed. After visit summary was provided. Advice to call clinic or me for any further questions or concern.        Galen Delaney, DO

## 2020-07-31 ENCOUNTER — TELEPHONE (OUTPATIENT)
Dept: INTERNAL MEDICINE CLINIC | Age: 60
End: 2020-07-31

## 2020-07-31 RX ORDER — TRAMADOL HYDROCHLORIDE 50 MG/1
50 TABLET ORAL 2 TIMES DAILY PRN
Qty: 30 TABLET | Refills: 0 | Status: ON HOLD | OUTPATIENT
Start: 2020-07-31 | End: 2020-08-16 | Stop reason: HOSPADM

## 2020-07-31 NOTE — TELEPHONE ENCOUNTER
Called and spoke with the patient. Patient continues to have moderate amount of right-sided pain he could not sleep at night while laying on his right side. Sending him tramadol 50 mg twice daily for 2 weeks. Patient will call back if symptoms does not improve.

## 2020-07-31 NOTE — TELEPHONE ENCOUNTER
Having a hard time sleeping as he is in so much pain, wants to know if you can call something in to [facility].  Please call [phone]

## 2020-08-05 ENCOUNTER — OFFICE VISIT (OUTPATIENT)
Dept: INTERNAL MEDICINE CLINIC | Age: 60
End: 2020-08-05
Payer: COMMERCIAL

## 2020-08-05 VITALS
DIASTOLIC BLOOD PRESSURE: 80 MMHG | OXYGEN SATURATION: 98 % | HEART RATE: 75 BPM | SYSTOLIC BLOOD PRESSURE: 138 MMHG | TEMPERATURE: 98.1 F

## 2020-08-05 PROCEDURE — 99213 OFFICE O/P EST LOW 20 MIN: CPT | Performed by: FAMILY MEDICINE

## 2020-08-05 ASSESSMENT — ENCOUNTER SYMPTOMS
ABDOMINAL PAIN: 0
DIARRHEA: 0
SORE THROAT: 0
CONSTIPATION: 0
EYE REDNESS: 0
WHEEZING: 0
VOMITING: 0
SHORTNESS OF BREATH: 0
COUGH: 0
SINUS PAIN: 0
ABDOMINAL DISTENTION: 0
NAUSEA: 0
TROUBLE SWALLOWING: 0
EYE ITCHING: 0
BACK PAIN: 0
CHEST TIGHTNESS: 0

## 2020-08-05 NOTE — PROGRESS NOTES
Subjective:      Chief Complaint: Right lateral rib cage pain    HPI:  Jes Velasco is a 61 y.o. male who presents today for below acute complaint:    Right lateral rib cage pain: Patient has a chronic pain for 10+ years on the right side of the rib. However, recently seen pain is got worsened and he is unable to lie on his right side. Patient also has shortness of breath due to pain. Pain is aching to sharp in character depending upon the position of the ribs. At last visit, patient was given OMT, prednisone and topical nonsteroidal cream which failed to improve patient's symptoms. Patient denies having any recent injury or fall. Patient Active Problem List   Diagnosis    Essential hypertension    Hepatitis C    Colitis    Mixed hyperlipidemia    Vitamin D deficiency    Type 2 diabetes mellitus without complication ([address]    Multiple lung nodules    Hilar adenopathy    Family history of lung cancer    Peyronie disease    Gastroesophageal reflux disease without esophagitis    Somatic dysfunction of rib cage region    Somatic dysfunction of back    Rib pain on right side       Past Medical History:   Diagnosis Date    Colitis     Diabetes mellitus ([facility]     DJD (degenerative joint disease) of lumbar spine 12/21/2006    MRI Lumbar spine    External hemorrhoids     Family history of lung cancer 1/16/2017    Gallstones 12/15/2009    CT thorax    GERD (gastroesophageal reflux disease)     H/O cardiovascular stress test 3/17/14    3/14-WNL. EF 62%. Exercise capacity 12.8 METS.      Hepatitis C     Hilar adenopathy 1/16/2017    Hypertension     Lipoma 3-    submucosal    Multiple lung nodules 1/16/2017    Polyp, sigmoid colon 1/5/2010    hyperplastic    Pulmonary nodules 12/16/2009    followed by Dr Angelica Rodarte Sleep apnea, obstructive 2/2000    nasal CPAP 8cm    Vertigo         Social History     Tobacco Use    Smoking status: Never Smoker    Smokeless tobacco: Never Used Substance Use Topics    Alcohol use: Yes     Comment: occ        No family history on file. Review of Systems   Constitutional: Negative for appetite change, chills, fatigue, fever and unexpected weight change. HENT: Negative for congestion, ear pain, sinus pain, sore throat and trouble swallowing. Eyes: Negative for redness and itching. Respiratory: Negative for cough, chest tightness, shortness of breath and wheezing. Right lateral lower rib cage pain   Cardiovascular: Negative for chest pain and palpitations. Gastrointestinal: Negative for abdominal distention, abdominal pain, constipation, diarrhea, nausea and vomiting. Endocrine: Negative for polyuria. Genitourinary: Negative for difficulty urinating, dysuria, frequency and urgency. Musculoskeletal: Positive for arthralgias. Negative for back pain and myalgias. Skin: Negative for rash. Neurological: Negative for dizziness and headaches. Psychiatric/Behavioral: Negative for behavioral problems, confusion and suicidal ideas. Objective:      /80   Pulse 75   Temp 98.1 °F (36.7 °C)   SpO2 98%      Physical Exam  Vitals signs reviewed. Constitutional:       Appearance: Normal appearance. He is well-developed. HENT:      Head: Normocephalic and atraumatic. Right Ear: External ear normal.      Left Ear: External ear normal.      Mouth/Throat:      Mouth: Mucous membranes are moist.      Pharynx: Oropharynx is clear. Eyes:      Extraocular Movements: Extraocular movements intact. Conjunctiva/sclera: Conjunctivae normal.      Pupils: Pupils are equal, round, and reactive to light. Neck:      Musculoskeletal: Normal range of motion and neck supple. Thyroid: No thyromegaly or thyroid tenderness. Vascular: No carotid bruit. Cardiovascular:      Rate and Rhythm: Normal rate and regular rhythm. Pulses: Normal pulses.       Heart sounds: Normal heart sounds, S1 normal and S2 normal. No murmur. Pulmonary:      Effort: Pulmonary effort is normal.      Breath sounds: Normal breath sounds. Comments: Right lateral lower rib cage tenderness  Chest:      Chest wall: Tenderness present. Abdominal:      General: Bowel sounds are normal. There is no distension. Palpations: Abdomen is soft. Tenderness: There is no abdominal tenderness. There is no guarding. Hernia: No hernia is present. Comments: Soft, no hepatosplenomegaly   Musculoskeletal: Normal range of motion. Right lower leg: No edema. Left lower leg: No edema. Comments: 5-5 muscular strength throughout and bilateral.   Lymphadenopathy:      Cervical: No cervical adenopathy. Skin:     General: Skin is warm and dry. Findings: No rash. Neurological:      General: No focal deficit present. Mental Status: He is alert and oriented to person, place, and time. Sensory: No sensory deficit. Motor: No weakness. Psychiatric:         Mood and Affect: Mood normal.         Behavior: Behavior normal.         Thought Content: Thought content normal.         Judgment: Judgment normal.            Assessment / Plan:      1. Rib pain on right side  - Patient continues to have right lower rib cage tenderness despite conservative therapies: Nonsteroidal cream, prednisone and muscular relaxer.  - Ordering chest x-ray and CT abdomen.  - CT ABDOMEN W WO CONTRAST; Future  - XR CHEST STANDARD (2 VW); Future          Note:   Patient understand the assessment and agreed to the plan. Medication side effects was discussed during the encounter. Before discharging the patient, all questions and concern were addressed. After visit summary was provided. Advice to call clinic or me for any further questions or concern.        Galen Michel,

## 2020-08-12 RX ORDER — LISINOPRIL 10 MG/1
TABLET ORAL
Qty: 30 TABLET | Refills: 6 | Status: SHIPPED | OUTPATIENT
Start: 2020-08-12 | End: 2020-12-30 | Stop reason: SDUPTHER

## 2020-08-12 RX ORDER — GLIMEPIRIDE 2 MG/1
TABLET ORAL
Qty: 30 TABLET | Refills: 5 | OUTPATIENT
Start: 2020-08-12

## 2020-08-14 ENCOUNTER — HOSPITAL ENCOUNTER (INPATIENT)
Age: 60
LOS: 1 days | Discharge: HOME | DRG: 419 | End: 2020-08-16
Attending: EMERGENCY MEDICINE | Admitting: INTERNAL MEDICINE
Payer: COMMERCIAL

## 2020-08-14 ENCOUNTER — APPOINTMENT (OUTPATIENT)
Dept: CT IMAGING | Age: 60
DRG: 419 | End: 2020-08-14
Payer: COMMERCIAL

## 2020-08-14 ENCOUNTER — APPOINTMENT (OUTPATIENT)
Dept: GENERAL RADIOLOGY | Age: 60
DRG: 419 | End: 2020-08-14
Payer: COMMERCIAL

## 2020-08-14 ENCOUNTER — HOSPITAL ENCOUNTER (OUTPATIENT)
Dept: GENERAL RADIOLOGY | Age: 60
Discharge: HOME | DRG: 419 | End: 2020-08-14
Payer: COMMERCIAL

## 2020-08-14 ENCOUNTER — HOSPITAL ENCOUNTER (OUTPATIENT)
Age: 60
Discharge: HOME | DRG: 419 | End: 2020-08-14
Payer: COMMERCIAL

## 2020-08-14 ENCOUNTER — HOSPITAL ENCOUNTER (OUTPATIENT)
Dept: CT IMAGING | Age: 60
Discharge: HOME | DRG: 419 | End: 2020-08-14
Payer: COMMERCIAL

## 2020-08-14 DIAGNOSIS — K80.00 ACUTE CALCULOUS CHOLECYSTITIS: ICD-10-CM

## 2020-08-14 DIAGNOSIS — D72.829 LEUKOCYTOSIS, UNSPECIFIED TYPE: ICD-10-CM

## 2020-08-14 DIAGNOSIS — R07.81 RIB PAIN ON RIGHT SIDE: ICD-10-CM

## 2020-08-14 DIAGNOSIS — R07.9 CHEST PAIN, UNSPECIFIED TYPE: Primary | ICD-10-CM

## 2020-08-14 DIAGNOSIS — R11.2 NAUSEA AND VOMITING, INTRACTABILITY OF VOMITING NOT SPECIFIED, UNSPECIFIED VOMITING TYPE: ICD-10-CM

## 2020-08-14 DIAGNOSIS — R10.9 ABDOMINAL PAIN, UNSPECIFIED ABDOMINAL LOCATION: ICD-10-CM

## 2020-08-14 DIAGNOSIS — R74.8 ABNORMAL SERUM LEVEL OF LIPASE: ICD-10-CM

## 2020-08-14 LAB
ALBUMIN SERPL-MCNC: 4.7 GM/DL (ref 3.4–5)
ALP BLD-CCNC: 116 IU/L (ref 40–129)
ALT SERPL-CCNC: 22 U/L (ref 10–40)
ANION GAP SERPL CALCULATED.3IONS-SCNC: 16 MMOL/L (ref 4–16)
AST SERPL-CCNC: 17 IU/L (ref 15–37)
BASOPHILS ABSOLUTE: 0 K/CU MM
BASOPHILS RELATIVE PERCENT: 0.2 % (ref 0–1)
BILIRUB SERPL-MCNC: 0.5 MG/DL (ref 0–1)
BUN BLDV-MCNC: 12 MG/DL (ref 6–23)
CALCIUM SERPL-MCNC: 9.4 MG/DL (ref 8.3–10.6)
CHLORIDE BLD-SCNC: 103 MMOL/L (ref 99–110)
CO2: 18 MMOL/L (ref 21–32)
CREAT SERPL-MCNC: 0.5 MG/DL (ref 0.9–1.3)
DIFFERENTIAL TYPE: ABNORMAL
EOSINOPHILS ABSOLUTE: 0 K/CU MM
EOSINOPHILS RELATIVE PERCENT: 0.2 % (ref 0–3)
GFR AFRICAN AMERICAN: >60 ML/MIN/1.73M2
GFR AFRICAN AMERICAN: >60 ML/MIN/1.73M2
GFR NON-AFRICAN AMERICAN: >60 ML/MIN/1.73M2
GFR NON-AFRICAN AMERICAN: >60 ML/MIN/1.73M2
GLUCOSE BLD-MCNC: 197 MG/DL (ref 70–99)
GLUCOSE BLD-MCNC: 225 MG/DL (ref 70–99)
GLUCOSE BLD-MCNC: 244 MG/DL (ref 70–99)
HCT VFR BLD CALC: 51.6 % (ref 42–52)
HEMOGLOBIN: 17.5 GM/DL (ref 13.5–18)
IMMATURE NEUTROPHIL %: 0.6 % (ref 0–0.43)
LACTATE: 1.5 MMOL/L (ref 0.4–2)
LIPASE: 124 IU/L (ref 13–60)
LYMPHOCYTES ABSOLUTE: 1.5 K/CU MM
LYMPHOCYTES RELATIVE PERCENT: 12 % (ref 24–44)
MAGNESIUM: 1.7 MG/DL (ref 1.8–2.4)
MCH RBC QN AUTO: 32.5 PG (ref 27–31)
MCHC RBC AUTO-ENTMCNC: 33.9 % (ref 32–36)
MCV RBC AUTO: 95.7 FL (ref 78–100)
MONOCYTES ABSOLUTE: 0.3 K/CU MM
MONOCYTES RELATIVE PERCENT: 2.5 % (ref 0–4)
NUCLEATED RBC %: 0 %
PDW BLD-RTO: 11.9 % (ref 11.7–14.9)
PLATELET # BLD: 124 K/CU MM (ref 140–440)
PMV BLD AUTO: 10.1 FL (ref 7.5–11.1)
POC CREATININE: 0.6 MG/DL (ref 0.9–1.3)
POTASSIUM SERPL-SCNC: 3.6 MMOL/L (ref 3.5–5.1)
PRO-BNP: 23.99 PG/ML
RBC # BLD: 5.39 M/CU MM (ref 4.6–6.2)
SEGMENTED NEUTROPHILS ABSOLUTE COUNT: 10.6 K/CU MM
SEGMENTED NEUTROPHILS RELATIVE PERCENT: 84.5 % (ref 36–66)
SODIUM BLD-SCNC: 137 MMOL/L (ref 135–145)
TOTAL CK: 171 IU/L (ref 38–174)
TOTAL IMMATURE NEUTOROPHIL: 0.07 K/CU MM
TOTAL NUCLEATED RBC: 0 K/CU MM
TOTAL PROTEIN: 7.2 GM/DL (ref 6.4–8.2)
TROPONIN T: <0.01 NG/ML
WBC # BLD: 12.5 K/CU MM (ref 4–10.5)

## 2020-08-14 PROCEDURE — 85025 COMPLETE CBC W/AUTO DIFF WBC: CPT

## 2020-08-14 PROCEDURE — 6360000002 HC RX W HCPCS: Performed by: EMERGENCY MEDICINE

## 2020-08-14 PROCEDURE — 96365 THER/PROPH/DIAG IV INF INIT: CPT

## 2020-08-14 PROCEDURE — 83690 ASSAY OF LIPASE: CPT

## 2020-08-14 PROCEDURE — 6360000002 HC RX W HCPCS: Performed by: INTERNAL MEDICINE

## 2020-08-14 PROCEDURE — 80053 COMPREHEN METABOLIC PANEL: CPT

## 2020-08-14 PROCEDURE — 83880 ASSAY OF NATRIURETIC PEPTIDE: CPT

## 2020-08-14 PROCEDURE — 6360000004 HC RX CONTRAST MEDICATION: Performed by: EMERGENCY MEDICINE

## 2020-08-14 PROCEDURE — 74174 CTA ABD&PLVS W/CONTRAST: CPT

## 2020-08-14 PROCEDURE — 96372 THER/PROPH/DIAG INJ SC/IM: CPT

## 2020-08-14 PROCEDURE — 93005 ELECTROCARDIOGRAM TRACING: CPT | Performed by: EMERGENCY MEDICINE

## 2020-08-14 PROCEDURE — 6360000004 HC RX CONTRAST MEDICATION: Performed by: FAMILY MEDICINE

## 2020-08-14 PROCEDURE — 96375 TX/PRO/DX INJ NEW DRUG ADDON: CPT

## 2020-08-14 PROCEDURE — 83605 ASSAY OF LACTIC ACID: CPT

## 2020-08-14 PROCEDURE — 71275 CT ANGIOGRAPHY CHEST: CPT

## 2020-08-14 PROCEDURE — 96374 THER/PROPH/DIAG INJ IV PUSH: CPT

## 2020-08-14 PROCEDURE — 2500000003 HC RX 250 WO HCPCS: Performed by: EMERGENCY MEDICINE

## 2020-08-14 PROCEDURE — 82962 GLUCOSE BLOOD TEST: CPT

## 2020-08-14 PROCEDURE — 71045 X-RAY EXAM CHEST 1 VIEW: CPT

## 2020-08-14 PROCEDURE — 2700000000 HC OXYGEN THERAPY PER DAY

## 2020-08-14 PROCEDURE — 96366 THER/PROPH/DIAG IV INF ADDON: CPT

## 2020-08-14 PROCEDURE — 99285 EMERGENCY DEPT VISIT HI MDM: CPT

## 2020-08-14 PROCEDURE — 36415 COLL VENOUS BLD VENIPUNCTURE: CPT

## 2020-08-14 PROCEDURE — 82550 ASSAY OF CK (CPK): CPT

## 2020-08-14 PROCEDURE — 83735 ASSAY OF MAGNESIUM: CPT

## 2020-08-14 PROCEDURE — 84484 ASSAY OF TROPONIN QUANT: CPT

## 2020-08-14 PROCEDURE — 6370000000 HC RX 637 (ALT 250 FOR IP): Performed by: INTERNAL MEDICINE

## 2020-08-14 PROCEDURE — 2580000003 HC RX 258: Performed by: EMERGENCY MEDICINE

## 2020-08-14 PROCEDURE — 6370000000 HC RX 637 (ALT 250 FOR IP): Performed by: EMERGENCY MEDICINE

## 2020-08-14 PROCEDURE — 94761 N-INVAS EAR/PLS OXIMETRY MLT: CPT

## 2020-08-14 PROCEDURE — 2580000003 HC RX 258: Performed by: INTERNAL MEDICINE

## 2020-08-14 PROCEDURE — G0378 HOSPITAL OBSERVATION PER HR: HCPCS

## 2020-08-14 PROCEDURE — 74160 CT ABDOMEN W/CONTRAST: CPT

## 2020-08-14 PROCEDURE — 71046 X-RAY EXAM CHEST 2 VIEWS: CPT

## 2020-08-14 RX ORDER — MORPHINE SULFATE 4 MG/ML
4 INJECTION, SOLUTION INTRAMUSCULAR; INTRAVENOUS EVERY 30 MIN PRN
Status: DISCONTINUED | OUTPATIENT
Start: 2020-08-14 | End: 2020-08-16 | Stop reason: HOSPADM

## 2020-08-14 RX ORDER — SODIUM CHLORIDE 0.9 % (FLUSH) 0.9 %
10 SYRINGE (ML) INJECTION EVERY 12 HOURS SCHEDULED
Status: DISCONTINUED | OUTPATIENT
Start: 2020-08-14 | End: 2020-08-16 | Stop reason: HOSPADM

## 2020-08-14 RX ORDER — PANTOPRAZOLE SODIUM 40 MG/1
40 TABLET, DELAYED RELEASE ORAL
Status: DISCONTINUED | OUTPATIENT
Start: 2020-08-15 | End: 2020-08-16 | Stop reason: HOSPADM

## 2020-08-14 RX ORDER — MAGNESIUM SULFATE 1 G/100ML
1 INJECTION INTRAVENOUS ONCE
Status: COMPLETED | OUTPATIENT
Start: 2020-08-14 | End: 2020-08-15

## 2020-08-14 RX ORDER — ACETAMINOPHEN 325 MG/1
650 TABLET ORAL EVERY 6 HOURS PRN
Status: DISCONTINUED | OUTPATIENT
Start: 2020-08-14 | End: 2020-08-16 | Stop reason: HOSPADM

## 2020-08-14 RX ORDER — ACETAMINOPHEN 650 MG/1
650 SUPPOSITORY RECTAL EVERY 6 HOURS PRN
Status: DISCONTINUED | OUTPATIENT
Start: 2020-08-14 | End: 2020-08-16 | Stop reason: HOSPADM

## 2020-08-14 RX ORDER — DICYCLOMINE HYDROCHLORIDE 10 MG/1
10 CAPSULE ORAL 3 TIMES DAILY PRN
Status: DISCONTINUED | OUTPATIENT
Start: 2020-08-14 | End: 2020-08-16 | Stop reason: HOSPADM

## 2020-08-14 RX ORDER — SODIUM CHLORIDE 0.9 % (FLUSH) 0.9 %
10 SYRINGE (ML) INJECTION PRN
Status: DISCONTINUED | OUTPATIENT
Start: 2020-08-14 | End: 2020-08-16 | Stop reason: HOSPADM

## 2020-08-14 RX ORDER — LISINOPRIL 10 MG/1
10 TABLET ORAL DAILY
Status: DISCONTINUED | OUTPATIENT
Start: 2020-08-14 | End: 2020-08-16 | Stop reason: HOSPADM

## 2020-08-14 RX ORDER — POLYETHYLENE GLYCOL 3350 17 G/17G
17 POWDER, FOR SOLUTION ORAL DAILY PRN
Status: DISCONTINUED | OUTPATIENT
Start: 2020-08-14 | End: 2020-08-16 | Stop reason: HOSPADM

## 2020-08-14 RX ORDER — METHOCARBAMOL 500 MG/1
750 TABLET, FILM COATED ORAL 3 TIMES DAILY
Status: DISCONTINUED | OUTPATIENT
Start: 2020-08-14 | End: 2020-08-16 | Stop reason: HOSPADM

## 2020-08-14 RX ORDER — PROMETHAZINE HYDROCHLORIDE 25 MG/1
12.5 TABLET ORAL EVERY 6 HOURS PRN
Status: DISCONTINUED | OUTPATIENT
Start: 2020-08-14 | End: 2020-08-16 | Stop reason: HOSPADM

## 2020-08-14 RX ORDER — INSULIN GLARGINE 100 [IU]/ML
15 INJECTION, SOLUTION SUBCUTANEOUS NIGHTLY
Status: DISCONTINUED | OUTPATIENT
Start: 2020-08-14 | End: 2020-08-16 | Stop reason: HOSPADM

## 2020-08-14 RX ORDER — ATORVASTATIN CALCIUM 40 MG/1
40 TABLET, FILM COATED ORAL NIGHTLY
Status: DISCONTINUED | OUTPATIENT
Start: 2020-08-14 | End: 2020-08-16 | Stop reason: HOSPADM

## 2020-08-14 RX ORDER — 0.9 % SODIUM CHLORIDE 0.9 %
1000 INTRAVENOUS SOLUTION INTRAVENOUS ONCE
Status: COMPLETED | OUTPATIENT
Start: 2020-08-14 | End: 2020-08-14

## 2020-08-14 RX ORDER — SODIUM CHLORIDE 0.9 % (FLUSH) 0.9 %
10 SYRINGE (ML) INJECTION PRN
Status: DISCONTINUED | OUTPATIENT
Start: 2020-08-14 | End: 2020-08-15 | Stop reason: HOSPADM

## 2020-08-14 RX ORDER — ASPIRIN 81 MG/1
324 TABLET, CHEWABLE ORAL ONCE
Status: COMPLETED | OUTPATIENT
Start: 2020-08-14 | End: 2020-08-14

## 2020-08-14 RX ORDER — MECLIZINE HYDROCHLORIDE 25 MG/1
25 TABLET ORAL 3 TIMES DAILY PRN
Status: DISCONTINUED | OUTPATIENT
Start: 2020-08-14 | End: 2020-08-16 | Stop reason: HOSPADM

## 2020-08-14 RX ORDER — ONDANSETRON 2 MG/ML
4 INJECTION INTRAMUSCULAR; INTRAVENOUS EVERY 6 HOURS PRN
Status: DISCONTINUED | OUTPATIENT
Start: 2020-08-14 | End: 2020-08-16 | Stop reason: HOSPADM

## 2020-08-14 RX ORDER — ONDANSETRON 2 MG/ML
4 INJECTION INTRAMUSCULAR; INTRAVENOUS EVERY 30 MIN PRN
Status: DISCONTINUED | OUTPATIENT
Start: 2020-08-14 | End: 2020-08-16 | Stop reason: HOSPADM

## 2020-08-14 RX ORDER — SODIUM CHLORIDE 9 MG/ML
INJECTION, SOLUTION INTRAVENOUS CONTINUOUS
Status: DISCONTINUED | OUTPATIENT
Start: 2020-08-14 | End: 2020-08-16 | Stop reason: HOSPADM

## 2020-08-14 RX ORDER — NITROGLYCERIN 0.4 MG/1
0.4 TABLET SUBLINGUAL EVERY 5 MIN PRN
Status: DISCONTINUED | OUTPATIENT
Start: 2020-08-14 | End: 2020-08-16 | Stop reason: HOSPADM

## 2020-08-14 RX ORDER — TRAMADOL HYDROCHLORIDE 50 MG/1
50 TABLET ORAL EVERY 6 HOURS PRN
Status: DISCONTINUED | OUTPATIENT
Start: 2020-08-14 | End: 2020-08-16 | Stop reason: HOSPADM

## 2020-08-14 RX ORDER — ASPIRIN 81 MG/1
81 TABLET, CHEWABLE ORAL DAILY
Status: DISCONTINUED | OUTPATIENT
Start: 2020-08-15 | End: 2020-08-16 | Stop reason: HOSPADM

## 2020-08-14 RX ORDER — DICYCLOMINE HYDROCHLORIDE 10 MG/ML
20 INJECTION INTRAMUSCULAR ONCE
Status: DISCONTINUED | OUTPATIENT
Start: 2020-08-14 | End: 2020-08-16 | Stop reason: HOSPADM

## 2020-08-14 RX ADMIN — SODIUM CHLORIDE: 9 INJECTION, SOLUTION INTRAVENOUS at 21:49

## 2020-08-14 RX ADMIN — IOHEXOL 50 ML: 240 INJECTION, SOLUTION INTRATHECAL; INTRAVASCULAR; INTRAVENOUS; ORAL at 13:10

## 2020-08-14 RX ADMIN — MAGNESIUM SULFATE HEPTAHYDRATE 1 G: 1 INJECTION, SOLUTION INTRAVENOUS at 21:56

## 2020-08-14 RX ADMIN — SODIUM CHLORIDE, PRESERVATIVE FREE 10 ML: 5 INJECTION INTRAVENOUS at 21:56

## 2020-08-14 RX ADMIN — ENOXAPARIN SODIUM 40 MG: 40 INJECTION SUBCUTANEOUS at 21:55

## 2020-08-14 RX ADMIN — SODIUM CHLORIDE 1000 ML: 9 INJECTION, SOLUTION INTRAVENOUS at 16:37

## 2020-08-14 RX ADMIN — SODIUM CHLORIDE 1000 ML: 9 INJECTION, SOLUTION INTRAVENOUS at 16:36

## 2020-08-14 RX ADMIN — ASPIRIN 81 MG CHEWABLE TABLET 324 MG: 81 TABLET CHEWABLE at 16:32

## 2020-08-14 RX ADMIN — TRAMADOL HYDROCHLORIDE 50 MG: 50 TABLET, FILM COATED ORAL at 21:55

## 2020-08-14 RX ADMIN — MORPHINE SULFATE 4 MG: 4 INJECTION, SOLUTION INTRAMUSCULAR; INTRAVENOUS at 16:36

## 2020-08-14 RX ADMIN — ATORVASTATIN CALCIUM 40 MG: 40 TABLET, FILM COATED ORAL at 21:55

## 2020-08-14 RX ADMIN — FAMOTIDINE 20 MG: 10 INJECTION INTRAVENOUS at 16:36

## 2020-08-14 RX ADMIN — IOPAMIDOL 80 ML: 755 INJECTION, SOLUTION INTRAVENOUS at 18:17

## 2020-08-14 RX ADMIN — IOPAMIDOL 75 ML: 755 INJECTION, SOLUTION INTRAVENOUS at 13:40

## 2020-08-14 RX ADMIN — METHOCARBAMOL TABLETS 750 MG: 500 TABLET, COATED ORAL at 21:55

## 2020-08-14 ASSESSMENT — PAIN DESCRIPTION - LOCATION: LOCATION: CHEST

## 2020-08-14 ASSESSMENT — ENCOUNTER SYMPTOMS
EYES NEGATIVE: 1
ABDOMINAL PAIN: 1
SHORTNESS OF BREATH: 1
VOMITING: 1
ALLERGIC/IMMUNOLOGIC NEGATIVE: 1

## 2020-08-14 ASSESSMENT — PAIN SCALES - GENERAL
PAINLEVEL_OUTOF10: 7
PAINLEVEL_OUTOF10: 3
PAINLEVEL_OUTOF10: 3

## 2020-08-14 ASSESSMENT — PAIN DESCRIPTION - PAIN TYPE: TYPE: ACUTE PAIN

## 2020-08-14 NOTE — ED NOTES
Bed: H-01  Expected date:   Expected time:   Means of arrival:   Comments:  ems     Chilo Catherine RN  08/14/20 [id number]

## 2020-08-14 NOTE — ED NOTES
Bed: 01TR-01  Expected date:   Expected time:   Means of arrival:   Comments:  maria del carmen Harry RN  08/14/20 [id number]

## 2020-08-14 NOTE — H&P
Hospitalist H&P Note:   Date of Service: 8/14/2020   ? CHIEF COMPLAINT:   Chest pain    HISTORY OF PRESENT ILLNESS (HPI):   Mr. Asaf Claros, a 61y.o. year old male who  has a past medical history of Colitis, Diabetes mellitus ([facility] [address] DJD (degenerative joint disease) of lumbar spine, External hemorrhoids, Family history of lung cancer, Gallstones, GERD (gastroesophageal reflux disease), H/O cardiovascular stress test, Hepatitis C, Hilar adenopathy, Hypertension, Lipoma, Multiple lung nodules, Polyp, sigmoid colon, Pulmonary nodules, Sleep apnea, obstructive, and Vertigo. Presented with complaints of chest pain, shortness of breath, nausea/vomiting. He reported pain  6/10 partially relieved by nitro. During my evaluation patient symptoms got slightly worse with  dizziness and near syncopal event. Cardiology consult from ED placed. Patient denied any cough, fever/chills, headache, visual symptoms, any focal weakness. On presentation, Blood pressure (!) 128/91, pulse 93, temperature 97.6 °F (36.4 °C), temperature source Oral, resp. rate 18, height 6' (1.829 m), weight 250 lb (113.4 kg), SpO2 100 %. PAST MEDICAL HISTORY   Past Medical History:   Diagnosis Date    Colitis     Diabetes mellitus ([facility] [address]     DJD (degenerative joint disease) of lumbar spine 12/21/2006    MRI Lumbar spine    External hemorrhoids     Family history of lung cancer 1/16/2017    Gallstones 12/15/2009    CT thorax    GERD (gastroesophageal reflux disease)     H/O cardiovascular stress test 3/17/14    3/14-WNL. EF 62%. Exercise capacity 12.8 METS.      Hepatitis C     Hilar adenopathy 1/16/2017    Hypertension     Lipoma 3-    submucosal    Multiple lung nodules 1/16/2017    Polyp, sigmoid colon 1/5/2010    hyperplastic    Pulmonary nodules 12/16/2009    followed by Dr Ann-Marie Morris Sleep apnea, obstructive 2/2000    nasal CPAP 8cm    Vertigo           SURGICAL HISTORY:   Past Surgical History:   Procedure Laterality Date    COLONOSCOPY  3-    LAPAROSCOPIC APPENDECTOMY  7/12/2013        ALLERGIES:   Lansoprazole   ? SOCIAL HISTORY:    reports that he has never smoked. He has never used smokeless tobacco. He reports current alcohol use. He reports that he does not use drugs. ?     FAMILY HISTORY:   History reviewed. No pertinent family history. ? MEDICATIONS:   Current Facility-Administered Medications   Medication Dose Route Frequency Provider Last Rate Last Dose    ondansetron (ZOFRAN) injection 4 mg  4 mg Intravenous Q30 Min PRN Jillene Hopping, DO        dicyclomine (BENTYL) injection 20 mg  20 mg Intramuscular Once Jillene Hopping, DO        morphine sulfate (PF) injection 4 mg  4 mg Intravenous Q30 Min PRN Jillene Hopping, DO   4 mg at 08/14/20 1636    magnesium sulfate 1 g in dextrose 5% 100 mL IVPB  1 g Intravenous Once Jillene Hopping, DO         Current Outpatient Medications   Medication Sig Dispense Refill    lisinopril (PRINIVIL;ZESTRIL) 10 MG tablet TAKE 1 TABLET BY MOUTH DAILY 30 tablet 6    traMADol (ULTRAM) 50 MG tablet Take 1 tablet by mouth 2 times daily as needed for Pain for up to 15 days. Intended supply: 3 days.  Take lowest dose possible to manage pain 30 tablet 0    diclofenac sodium (VOLTAREN) 1 % GEL Apply 2 g topically 4 times daily 1 Tube 0    omeprazole (PRILOSEC) 40 MG delayed release capsule TAKE ONE CAPSULE BY MOUTH DAILY 30 capsule 11    Insulin Pen Needle (PEN NEEDLES 31GX5/16\") 31G X 8 MM MISC 1 each by Does not apply route daily 100 each 3    insulin glargine (BASAGLAR KWIKPEN) 100 UNIT/ML injection pen Inject 15 Units into the skin nightly      JANUMET  MG per tablet TAKE 1 TABLET BY MOUTH 2 TIMES DAILY (WITH MEALS) 60 tablet 5    methocarbamol (ROBAXIN) 750 MG tablet TAKE 1 TABLET BY MOUTH TWICE A DAY AS NEEDED FOR BACK PAIN 50 tablet 5    meclizine (ANTIVERT) 25 MG tablet TAKE 1 TABLET BY MOUTH THREE TIMES A DAY AS NEEDED FOR DIZZINESS OR NAUSEA 45 tablet 1    sildenafil (VIAGRA) 100 MG tablet Take 1 tablet by mouth as needed (urinary obstruction) 30 tablet 3    dicyclomine (BENTYL) 10 MG capsule Take 1 capsule by mouth 3 times daily as needed 60 capsule 5    meclizine (ANTIVERT) 25 MG tablet Take 1 tablet by mouth 3 times daily as needed for Dizziness or Nausea. 30 tablet 1    Cholecalciferol (VITAMIN D3) 2000 UNITS CAPS Take  by mouth daily.  loratadine (CLARITIN) 10 MG tablet Take 10 mg by mouth as needed. Facility-Administered Medications Ordered in Other Encounters   Medication Dose Route Frequency Provider Last Rate Last Dose    sodium chloride flush 0.9 % injection 10 mL  10 mL Intravenous PRN Galen Suarez, DO          ?   ? REVIEW OF SYSTEMS:   All systems were reviewed and all were negative except for those mentioned in HPI. PHYSICAL EXAM:   Blood pressure (!) 128/91, pulse 93, temperature 97.6 °F (36.4 °C), temperature source Oral, resp. rate 18, height 6' (1.829 m), weight 250 lb (113.4 kg), SpO2 100 %. . Body mass index is 33.91 kg/m². CONSTITUTIONAL: Not in acute distress  EYES:Conjunctiva normal. No discharge. NECK: Neck supple,No JVD /Thyromegaly/LAD   RESP: No wheezing or rales. B/L air entry positive+  CVS: Regular rate and rhythm. S1 and S2 normal, no M/R/G  GI: Soft, ND/NT BS +. MUSCULAR/EXT:  no pedal edema, no clubbing or cyanosis,Pulses 2+ B/L  CNS: Awake, alert. Cranial nerves intact, no focal neurological deficits.    MOOD/PSYCH: Normal mood and affect  SKIN: Warm and dry,No rashes    Lab results:   Results for orders placed or performed during the hospital encounter of 08/14/20   CBC Auto Differential   Result Value Ref Range    WBC 12.5 (H) 4.0 - 10.5 K/CU MM    RBC 5.39 4.6 - 6.2 M/CU MM    Hemoglobin 17.5 13.5 - 18.0 GM/DL    Hematocrit 51.6 42 - 52 %    MCV 95.7 78 - 100 FL    MCH 32.5 (H) 27 - 31 PG    MCHC 33.9 32.0 - 36.0 %    RDW 11.9 11.7 - 14.9 %    Platelets 976 (L) 963 - 440 K/CU MM    MPV 10.1 7.5 - 11.1 FL    Differential Type AUTOMATED DIFFERENTIAL     Segs Relative 84.5 (H) 36 - 66 %    Lymphocytes % 12.0 (L) 24 - 44 %    Monocytes % 2.5 0 - 4 %    Eosinophils % 0.2 0 - 3 %    Basophils % 0.2 0 - 1 %    Segs Absolute 10.6 K/CU MM    Lymphocytes Absolute 1.5 K/CU MM    Monocytes Absolute 0.3 K/CU MM    Eosinophils Absolute 0.0 K/CU MM    Basophils Absolute 0.0 K/CU MM    Nucleated RBC % 0.0 %    Total Nucleated RBC 0.0 K/CU MM    Total Immature Neutrophil 0.07 K/CU MM    Immature Neutrophil % 0.6 (H) 0 - 0.43 %   CMP   Result Value Ref Range    Sodium 137 135 - 145 MMOL/L    Potassium 3.6 3.5 - 5.1 MMOL/L    Chloride 103 99 - 110 mMol/L    CO2 18 (L) 21 - 32 MMOL/L    BUN 12 6 - 23 MG/DL    CREATININE 0.5 (L) 0.9 - 1.3 MG/DL    Glucose 225 (H) 70 - 99 MG/DL    Calcium 9.4 8.3 - 10.6 MG/DL    Alb 4.7 3.4 - 5.0 GM/DL    Total Protein 7.2 6.4 - 8.2 GM/DL    Total Bilirubin 0.5 0.0 - 1.0 MG/DL    ALT 22 10 - 40 U/L    AST 17 15 - 37 IU/L    Alkaline Phosphatase 116 40 - 129 IU/L    GFR Non-African American >60 >60 mL/min/1.73m2    GFR African American >60 >60 mL/min/1.73m2    Anion Gap 16 4 - 16   Lipase   Result Value Ref Range    Lipase 124 (H) 13 - 60 IU/L   Troponin   Result Value Ref Range    Troponin T <0.010 <0.01 NG/ML   CK   Result Value Ref Range    Total  38 - 174 IU/L   Brain Natriuretic Peptide   Result Value Ref Range    Pro-BNP 23.99 <300 PG/ML   Magnesium   Result Value Ref Range    Magnesium 1.7 (L) 1.8 - 2.4 mg/dl   Lactic Acid, Plasma   Result Value Ref Range    Lactate 1.5 0.4 - 2.0 mMOL/L   POCT Glucose   Result Value Ref Range    POC Glucose 197 (H) 70 - 99 MG/DL   EKG 12 Lead   Result Value Ref Range    Ventricular Rate 92 BPM    Atrial Rate 92 BPM    P-R Interval 168 ms    QRS Duration 98 ms    Q-T Interval 350 ms    QTc Calculation (Bazett) 432 ms    P Axis 40 degrees    R Axis 59 degrees    T Axis 36 degrees    Diagnosis       Normal sinus rhythm  Normal ECG  No previous ECGs available     EKG 12 Lead   Result Value Ref Range    Ventricular Rate 91 BPM    Atrial Rate 91 BPM    P-R Interval 168 ms    QRS Duration 100 ms    Q-T Interval 350 ms    QTc Calculation (Bazett) 430 ms    P Axis 41 degrees    R Axis 63 degrees    T Axis 63 degrees    Diagnosis       Sinus rhythm with premature supraventricular complexes and with occasional premature ventricular complexes  Nonspecific ST abnormality  Abnormal ECG  When compared with ECG of 14-AUG-2020 16:38,  premature ventricular complexes are now present  premature supraventricular complexes are now present       XR CHEST PORTABLE    (Results Pending)   CTA CHEST W CONTRAST    (Results Pending)   CTA ABDOMEN PELVIS W CONTRAST    (Results Pending)        ASSESSMENT/IMPRESSION:     -Chest pain rule out ACS. Cardio consult from ED. Start aspirin, statin and beta-blocker. Monitor closely  Near syncopal event-check echocardiogram.  -Diabetes mellitus continue Lantus and SSI. Holding Januvia/metformin from home. Follow Accu-Cheks    ?       DVT prophylaxis: Lovenox  CODE STATUS full code  Admission status observation    Mary Curry MD   Hospitalist at [facility]

## 2020-08-14 NOTE — ED PROVIDER NOTES
[facility]      TRIAGE CHIEF COMPLAINT:   Chest Pain      [place]:  Rosalino Flores is a 61 y.o. male that presents with complaint of chest pain shortness of breath nausea vomiting bowel pain. Patient states symptoms started today he had outpatient scan of his abdomen pelvis he states after which he developed nausea vomiting chest pain shortness of breath abdominal pain. Brought in by EMS they did not give him aspirin because of vomiting him Zofran and nitro which helped his pain some he has a 6 out of 10 chest pain. Denies any heart problems or lung problems. Denies other questions or concerns pain is a 6 out of 10. No diarrhea no constipation. REVIEW OF SYSTEMS:  At least 10 systems reviewed and otherwise acutely negative except as in the [address] Review of Systems   Constitutional: Positive for fatigue. HENT: Negative. Eyes: Negative. Respiratory: Positive for shortness of breath. Cardiovascular: Positive for chest pain. Gastrointestinal: Positive for abdominal pain and vomiting. Endocrine: Negative. Genitourinary: Negative. Musculoskeletal: Negative. Skin: Negative. Allergic/Immunologic: Negative. Neurological: Negative. Hematological: Negative. Psychiatric/Behavioral: Negative. All other systems reviewed and are negative. Past Medical History:   Diagnosis Date    Colitis     Diabetes mellitus ([facility] [address]     DJD (degenerative joint disease) of lumbar spine 12/21/2006    MRI Lumbar spine    External hemorrhoids     Family history of lung cancer 1/16/2017    Gallstones 12/15/2009    CT thorax    GERD (gastroesophageal reflux disease)     H/O cardiovascular stress test 3/17/14    3/14-WNL. EF 62%. Exercise capacity 12.8 METS.      Hepatitis C     Hilar adenopathy 1/16/2017    Hypertension     Lipoma 3-    submucosal    Multiple lung nodules 1/16/2017    Polyp, sigmoid colon 1/5/2010    hyperplastic    Pulmonary nodules 12/16/2009    followed by Dr Raul Crawford Sleep apnea, obstructive 2/2000    nasal CPAP 8cm    Vertigo      Past Surgical History:   Procedure Laterality Date    COLONOSCOPY  3-    LAPAROSCOPIC APPENDECTOMY  7/12/2013     History reviewed. No pertinent family history.   Social History     Socioeconomic History    Marital status:      Spouse name: Not on file    Number of children: Not on file    Years of education: Not on file    Highest education level: Not on file   Occupational History    Not on file   Social Needs    Financial resource strain: Not on file    Food insecurity     Worry: Not on file     Inability: Not on file    Transportation needs     Medical: Not on file     Non-medical: Not on file   Tobacco Use    Smoking status: Never Smoker    Smokeless tobacco: Never Used   Substance and Sexual Activity    Alcohol use: Yes     Comment: occ    Drug use: No    Sexual activity: Not on file   Lifestyle    Physical activity     Days per week: Not on file     Minutes per session: Not on file    Stress: Not on file   Relationships    Social connections     Talks on phone: Not on file     Gets together: Not on file     Attends Jewish service: Not on file     Active member of club or organization: Not on file     Attends meetings of clubs or organizations: Not on file     Relationship status: Not on file    Intimate partner violence     Fear of current or ex partner: Not on file     Emotionally abused: Not on file     Physically abused: Not on file     Forced sexual activity: Not on file   Other Topics Concern    Not on file   Social History Narrative    Not on file     Current Facility-Administered Medications   Medication Dose Route Frequency Provider Last Rate Last Dose    0.9 % sodium chloride bolus  1,000 mL Intravenous Once Brittny Kim DO 1,000 mL/hr at 08/14/20 1636 1,000 mL at 08/14/20 1636    ondansetron (ZOFRAN) injection 4 mg  4 mg Intravenous Q30 Min PRN Viridiana Dayhoff Trevor,         dicyclomine (BENTYL) injection 20 mg  20 mg Intramuscular Once Claudia Asa, DO        morphine sulfate (PF) injection 4 mg  4 mg Intravenous Q30 Min PRN Claudia Asa, DO   4 mg at 08/14/20 1636    0.9 % sodium chloride bolus  1,000 mL Intravenous Once Claudia Asa, DO 1,000 mL/hr at 08/14/20 1637 1,000 mL at 08/14/20 1637    magnesium sulfate 1 g in dextrose 5% 100 mL IVPB  1 g Intravenous Once Claudia Asa, DO         Current Outpatient Medications   Medication Sig Dispense Refill    lisinopril (PRINIVIL;ZESTRIL) 10 MG tablet TAKE 1 TABLET BY MOUTH DAILY 30 tablet 6    traMADol (ULTRAM) 50 MG tablet Take 1 tablet by mouth 2 times daily as needed for Pain for up to 15 days. Intended supply: 3 days. Take lowest dose possible to manage pain 30 tablet 0    diclofenac sodium (VOLTAREN) 1 % GEL Apply 2 g topically 4 times daily 1 Tube 0    omeprazole (PRILOSEC) 40 MG delayed release capsule TAKE ONE CAPSULE BY MOUTH DAILY 30 capsule 11    Insulin Pen Needle (PEN NEEDLES 31GX5/16\") 31G X 8 MM MISC 1 each by Does not apply route daily 100 each 3    insulin glargine (BASAGLAR KWIKPEN) 100 UNIT/ML injection pen Inject 15 Units into the skin nightly      JANUMET  MG per tablet TAKE 1 TABLET BY MOUTH 2 TIMES DAILY (WITH MEALS) 60 tablet 5    methocarbamol (ROBAXIN) 750 MG tablet TAKE 1 TABLET BY MOUTH TWICE A DAY AS NEEDED FOR BACK PAIN 50 tablet 5    meclizine (ANTIVERT) 25 MG tablet TAKE 1 TABLET BY MOUTH THREE TIMES A DAY AS NEEDED FOR DIZZINESS OR NAUSEA 45 tablet 1    sildenafil (VIAGRA) 100 MG tablet Take 1 tablet by mouth as needed (urinary obstruction) 30 tablet 3    dicyclomine (BENTYL) 10 MG capsule Take 1 capsule by mouth 3 times daily as needed 60 capsule 5    meclizine (ANTIVERT) 25 MG tablet Take 1 tablet by mouth 3 times daily as needed for Dizziness or Nausea. 30 tablet 1    Cholecalciferol (VITAMIN D3) 2000 UNITS CAPS Take  by mouth daily.       loratadine (CLARITIN) 10 MG tablet Take 10 mg by mouth as needed. Facility-Administered Medications Ordered in Other Encounters   Medication Dose Route Frequency Provider Last Rate Last Dose    sodium chloride flush 0.9 % injection 10 mL  10 mL Intravenous PRN Galenvanessa Irvin, DO          Allergies   Allergen Reactions    Lansoprazole      diarrhea  cramping     Current Facility-Administered Medications   Medication Dose Route Frequency Provider Last Rate Last Dose    0.9 % sodium chloride bolus  1,000 mL Intravenous Once [employer]on [employer], DO 1,000 mL/hr at 08/14/20 1636 1,000 mL at 08/14/20 1636    ondansetron (ZOFRAN) injection 4 mg  4 mg Intravenous Q30 Min PRN [employer], DO        dicyclomine (BENTYL) injection 20 mg  20 mg Intramuscular Once Visteon [employer], DO        morphine sulfate (PF) injection 4 mg  4 mg Intravenous Q30 Min PRN Slinkyon [employer], DO   4 mg at 08/14/20 1636    0.9 % sodium chloride bolus  1,000 mL Intravenous Once Slinkyon [employer], DO 1,000 mL/hr at 08/14/20 1637 1,000 mL at 08/14/20 1637    magnesium sulfate 1 g in dextrose 5% 100 mL IVPB  1 g Intravenous Once Visteon [employer], DO         Current Outpatient Medications   Medication Sig Dispense Refill    lisinopril (PRINIVIL;ZESTRIL) 10 MG tablet TAKE 1 TABLET BY MOUTH DAILY 30 tablet 6    traMADol (ULTRAM) 50 MG tablet Take 1 tablet by mouth 2 times daily as needed for Pain for up to 15 days. Intended supply: 3 days.  Take lowest dose possible to manage pain 30 tablet 0    diclofenac sodium (VOLTAREN) 1 % GEL Apply 2 g topically 4 times daily 1 Tube 0    omeprazole (PRILOSEC) 40 MG delayed release capsule TAKE ONE CAPSULE BY MOUTH DAILY 30 capsule 11    Insulin Pen Needle (PEN NEEDLES 31GX5/16\") 31G X 8 MM MISC 1 each by Does not apply route daily 100 each 3    insulin glargine (BASAGLAR KWIKPEN) 100 UNIT/ML injection pen Inject 15 Units into the skin nightly      JANUMET  MG per tablet TAKE 1 TABLET BY MOUTH 2 TIMES DAILY (WITH MEALS) 60 tablet 5    methocarbamol (ROBAXIN) 750 MG tablet TAKE 1 TABLET BY MOUTH TWICE A DAY AS NEEDED FOR BACK PAIN 50 tablet 5    meclizine (ANTIVERT) 25 MG tablet TAKE 1 TABLET BY MOUTH THREE TIMES A DAY AS NEEDED FOR DIZZINESS OR NAUSEA 45 tablet 1    sildenafil (VIAGRA) 100 MG tablet Take 1 tablet by mouth as needed (urinary obstruction) 30 tablet 3    dicyclomine (BENTYL) 10 MG capsule Take 1 capsule by mouth 3 times daily as needed 60 capsule 5    meclizine (ANTIVERT) 25 MG tablet Take 1 tablet by mouth 3 times daily as needed for Dizziness or Nausea. 30 tablet 1    Cholecalciferol (VITAMIN D3) 2000 UNITS CAPS Take  by mouth daily.  loratadine (CLARITIN) 10 MG tablet Take 10 mg by mouth as needed. Facility-Administered Medications Ordered in Other Encounters   Medication Dose Route Frequency Provider Last Rate Last Dose    sodium chloride flush 0.9 % injection 10 mL  10 mL Intravenous PRN Galen Pillai DO           Nursing Notes Reviewed    VITAL SIGNS:  ED Triage Vitals   Enc Vitals Group      BP       Pulse       Resp       Temp       Temp src       SpO2       Weight       Height       Head Circumference       Peak Flow       Pain Score       Pain Loc       Pain Edu? Excl. in [address]? PHYSICAL EXAM:  Physical Exam  Vitals signs and nursing note reviewed. Constitutional:       General: He is not in acute distress. Appearance: Normal appearance. He is well-developed and well-groomed. He is ill-appearing and diaphoretic. He is not toxic-appearing. HENT:      Head: Normocephalic and atraumatic. Right Ear: External ear normal.      Left Ear: External ear normal.      Nose: No congestion or rhinorrhea. Mouth/Throat:      Mouth: Mucous membranes are moist.      Pharynx: No oropharyngeal exudate or posterior oropharyngeal erythema. Eyes:      General: No scleral icterus. Right eye: No discharge. Left eye: No discharge. Extraocular Movements: Extraocular movements intact. Conjunctiva/sclera: Conjunctivae normal.   Neck:      Musculoskeletal: Full passive range of motion without pain and normal range of motion. Normal range of motion. No edema, erythema, neck rigidity, crepitus, injury, pain with movement or torticollis. Vascular: No JVD. Trachea: Phonation normal.   Cardiovascular:      Rate and Rhythm: Normal rate and regular rhythm. Pulses: Normal pulses. Heart sounds: Normal heart sounds. No murmur. No friction rub. No gallop. Pulmonary:      Effort: Pulmonary effort is normal. No respiratory distress. Breath sounds: Normal breath sounds. No stridor. No wheezing, rhonchi or rales. Chest:      Chest wall: No tenderness. Abdominal:      General: Bowel sounds are normal. There is no distension. Palpations: Abdomen is soft. There is no mass or pulsatile mass. Tenderness: There is generalized abdominal tenderness and tenderness in the epigastric area. There is no guarding or rebound. Negative signs include Bajwa's sign, Rovsing's sign and McBurney's sign. Hernia: No hernia is present. Musculoskeletal: Normal range of motion. General: No swelling, tenderness, deformity or signs of injury. Right lower leg: No edema. Left lower leg: No edema. Skin:     General: Skin is warm. Coloration: Skin is not jaundiced or pale. Findings: No bruising, erythema, lesion or rash. Neurological:      General: No focal deficit present. Mental Status: He is alert and oriented to person, place, and time. GCS: GCS eye subscore is 4. GCS verbal subscore is 5. GCS motor subscore is 6. Cranial Nerves: Cranial nerves are intact. No cranial nerve deficit, dysarthria or facial asymmetry. Sensory: Sensation is intact. No sensory deficit. Motor: Motor function is intact. No weakness, tremor, atrophy, abnormal muscle tone or seizure activity. Coordination: Coordination is intact. Coordination normal.   Psychiatric:         Mood and Affect: Mood normal.         Behavior: Behavior normal. Behavior is cooperative. Thought Content:  Thought content normal.         Judgment: Judgment normal.           I have reviewed andinterpreted all of the currently available lab results from this visit (if applicable):    Results for orders placed or performed during the hospital encounter of 08/14/20   CBC Auto Differential   Result Value Ref Range    WBC 12.5 (H) 4.0 - 10.5 K/CU MM    RBC 5.39 4.6 - 6.2 M/CU MM    Hemoglobin 17.5 13.5 - 18.0 GM/DL    Hematocrit 51.6 42 - 52 %    MCV 95.7 78 - 100 FL    MCH 32.5 (H) 27 - 31 PG    MCHC 33.9 32.0 - 36.0 %    RDW 11.9 11.7 - 14.9 %    Platelets 744 (L) 118 - 440 K/CU MM    MPV 10.1 7.5 - 11.1 FL    Differential Type AUTOMATED DIFFERENTIAL     Segs Relative 84.5 (H) 36 - 66 %    Lymphocytes % 12.0 (L) 24 - 44 %    Monocytes % 2.5 0 - 4 %    Eosinophils % 0.2 0 - 3 %    Basophils % 0.2 0 - 1 %    Segs Absolute 10.6 K/CU MM    Lymphocytes Absolute 1.5 K/CU MM    Monocytes Absolute 0.3 K/CU MM    Eosinophils Absolute 0.0 K/CU MM    Basophils Absolute 0.0 K/CU MM    Nucleated RBC % 0.0 %    Total Nucleated RBC 0.0 K/CU MM    Total Immature Neutrophil 0.07 K/CU MM    Immature Neutrophil % 0.6 (H) 0 - 0.43 %   CMP   Result Value Ref Range    Sodium 137 135 - 145 MMOL/L    Potassium 3.6 3.5 - 5.1 MMOL/L    Chloride 103 99 - 110 mMol/L    CO2 18 (L) 21 - 32 MMOL/L    BUN 12 6 - 23 MG/DL    CREATININE 0.5 (L) 0.9 - 1.3 MG/DL    Glucose 225 (H) 70 - 99 MG/DL    Calcium 9.4 8.3 - 10.6 MG/DL    Alb 4.7 3.4 - 5.0 GM/DL    Total Protein 7.2 6.4 - 8.2 GM/DL    Total Bilirubin 0.5 0.0 - 1.0 MG/DL    ALT 22 10 - 40 U/L    AST 17 15 - 37 IU/L    Alkaline Phosphatase 116 40 - 129 IU/L    GFR Non-African American >60 >60 mL/min/1.73m2    GFR African American >60 >60 mL/min/1.73m2    Anion Gap 16 4 - 16   Lipase   Result Value Ref Range Additional signs and symptoms: Rib pain bilateral sides without specific injury. Patient also having CT scan today. FINDINGS: Two views of the chest were reviewed. Redemonstration of left-sided lung nodules with the more superior measuring 1.6 cm and the more inferior measuring 1.5 cm. These appear grossly similar in size when compared with radiograph from August 22, 2018 accounting for differences in measuring technique and have slightly increased in size when compared with CT chest from April 26, 2010. Lungs demonstrate chronic interstitial changes with no focal consolidation, pleural effusion, or pneumothorax identified. Osseous structures appear intact with no acute rib fracture identified within limits of radiograph. Chronic appearing fracture of the right 4th rib which appears similar to prior radiograph from April 22, 2018 and CT chest from 2010. 1. No acute right-sided rib fracture identified. 2. No acute cardiopulmonary process identified. 3. Redemonstration of left-sided pulmonary nodules measuring approximately 1.6 and 1.5 cm with overall grossly stable appearance compared with radiograph from August 22, 2018 accounting for differences in measuring technique. EKG (if obtained): (All EKG's are interpreted by myself in the absence of a cardiologist)    12 lead EKG per my interpretation #1:  Normal Sinus Rhythm 89  Axis is   Normal  QTc is  433  There is no specific T wave changes appreciated. There is no specific ST wave changes appreciated. Prior EKG to compare with was not available     12 lead EKG per my interpretation #2:  Normal Sinus Rhythm 92  Axis is   Normal  QTc is  432  There is no specific T wave changes appreciated. There is no specific ST wave changes appreciated. Prior EKG to compare with was not available     12 lead EKG per my interpretation # 3:  Normal Sinus Rhythm PAC's 91  Axis is   Normal  QTc is  430  There is no specific T wave changes appreciated.   There is no specific ST wave changes appreciated. Prior EKG to compare with was  available and different than previous       Total critical care time today provided was at least 30 minutes. This excludes seperately billable procedure. Critical care time provided for reviewing labs, images, giving aspirin, fluids, oxygen, consulting medicine, giving magnesium, consulting cardiology that required close evaluation and/or intervention with concern for patient decompensation. MDM:    Here chest pain shortness of breath nausea vomiting bowel pain. Patient states symptoms started today after getting outpatient CT scans of his abdomen pelvis. Patient brought in by EMS they gave him aspirin and nitro, correction only nitro did not give him aspirin due to nausea vomiting. EKG here is negative acutely he has a 6 out of 10 constant chest pain. Does have epigastric pain and left lower quadrant pain no obvious hernia no pulsatile masses good pulses good sensation no swelling denies history of DVT PE or AAA denies any heart or lung problems. Denies other questions or concerns I did talk to radiology outpatient CT scan is up just not read yet I did call them and have them stat read CT and pelvis were performed cardiac work-up here including chest x-ray EKG labs will give patient pain nausea medicine. Also given aspirin here. Patient likely need admission. Work-up performed chest x-ray CT abdomen pelvis all negative does have elevated white count of 12.5 lipase slightly elevated 124 he has no McBurney's point no Bajwa sign no hernia no pulsatile mass second EKG is negative. Patient had diaphoresis here unsure etiology will admit for chest pain nausea vomiting bowel pain and possible pancreatitis otherwise stable. Will admit. Troponin levels negative so far. No DVT or PE risk factors.   Patient's chest pain has resolved he still feels lightheaded diaphoresis nausea I will order CT scans of his entire aorta is Noncon was negative labs otherwise negative I did talk to cardiologist as his third EKG does have some PACs which is new recommend observation, Barrera troponins. Patient reminded to hospital medicine I talked to them. Otherwise stable      CLINICAL IMPRESSION:  Final diagnoses:   Chest pain, unspecified type   Abdominal pain, unspecified abdominal location   Nausea and vomiting, intractability of vomiting not specified, unspecified vomiting type   Leukocytosis, unspecified type   Abnormal serum level of lipase       (Please note that portions of this note may have been completed with a voice recognition program. Efforts were made to edit the dictations but occasionally words aremis-transcribed.)    DISPOSITION REFERRAL (if applicable):  No follow-up provider specified.     DISPOSITION MEDICATIONS (if applicable):  New Prescriptions    No medications on file          Brittny Judy, 9 [facility],   08/14/20 1722 [facility],   08/14/20 [id number]

## 2020-08-14 NOTE — ED NOTES
Pt arrives to ED via EMS for c/o sudden onset midsternal, non radiating CP. Pt also reported n/v. Denies SOB. Denies cough or fever. Pt does report feeling dizzy. Pt noted to be diaphoretic and drowsy on arrival. Pt arrives with PIV in place. Pt was given nitro x1 and 4 mg zofran IV in route to this facility.       Carley Harry RN  08/14/20 [id number]

## 2020-08-15 LAB
EKG ATRIAL RATE: 89 BPM
EKG ATRIAL RATE: 91 BPM
EKG ATRIAL RATE: 92 BPM
EKG DIAGNOSIS: NORMAL
EKG P AXIS: 40 DEGREES
EKG P AXIS: 41 DEGREES
EKG P AXIS: 45 DEGREES
EKG P-R INTERVAL: 166 MS
EKG P-R INTERVAL: 168 MS
EKG P-R INTERVAL: 168 MS
EKG Q-T INTERVAL: 350 MS
EKG Q-T INTERVAL: 350 MS
EKG Q-T INTERVAL: 356 MS
EKG QRS DURATION: 100 MS
EKG QRS DURATION: 98 MS
EKG QRS DURATION: 98 MS
EKG QTC CALCULATION (BAZETT): 430 MS
EKG QTC CALCULATION (BAZETT): 432 MS
EKG QTC CALCULATION (BAZETT): 433 MS
EKG R AXIS: 59 DEGREES
EKG R AXIS: 61 DEGREES
EKG R AXIS: 63 DEGREES
EKG T AXIS: 36 DEGREES
EKG T AXIS: 52 DEGREES
EKG T AXIS: 63 DEGREES
EKG VENTRICULAR RATE: 89 BPM
EKG VENTRICULAR RATE: 91 BPM
EKG VENTRICULAR RATE: 92 BPM
GLUCOSE BLD-MCNC: 163 MG/DL (ref 70–99)
GLUCOSE BLD-MCNC: 191 MG/DL (ref 70–99)
GLUCOSE BLD-MCNC: 196 MG/DL (ref 70–99)
GLUCOSE BLD-MCNC: 198 MG/DL (ref 70–99)
HCT VFR BLD CALC: 46.1 % (ref 42–52)
HEMOGLOBIN: 15.4 GM/DL (ref 13.5–18)
MCH RBC QN AUTO: 32.2 PG (ref 27–31)
MCHC RBC AUTO-ENTMCNC: 33.4 % (ref 32–36)
MCV RBC AUTO: 96.4 FL (ref 78–100)
PDW BLD-RTO: 12 % (ref 11.7–14.9)
PLATELET # BLD: 136 K/CU MM (ref 140–440)
PMV BLD AUTO: 10.2 FL (ref 7.5–11.1)
RBC # BLD: 4.78 M/CU MM (ref 4.6–6.2)
TROPONIN T: <0.01 NG/ML
TROPONIN T: <0.01 NG/ML
TSH HIGH SENSITIVITY: 0.78 UIU/ML (ref 0.27–4.2)
WBC # BLD: 18.7 K/CU MM (ref 4–10.5)

## 2020-08-15 PROCEDURE — 85027 COMPLETE CBC AUTOMATED: CPT

## 2020-08-15 PROCEDURE — 84443 ASSAY THYROID STIM HORMONE: CPT

## 2020-08-15 PROCEDURE — 96367 TX/PROPH/DG ADDL SEQ IV INF: CPT

## 2020-08-15 PROCEDURE — 2580000003 HC RX 258: Performed by: INTERNAL MEDICINE

## 2020-08-15 PROCEDURE — 96375 TX/PRO/DX INJ NEW DRUG ADDON: CPT

## 2020-08-15 PROCEDURE — 94761 N-INVAS EAR/PLS OXIMETRY MLT: CPT

## 2020-08-15 PROCEDURE — 6360000002 HC RX W HCPCS: Performed by: INTERNAL MEDICINE

## 2020-08-15 PROCEDURE — 99253 IP/OBS CNSLTJ NEW/EST LOW 45: CPT | Performed by: INTERNAL MEDICINE

## 2020-08-15 PROCEDURE — 93010 ELECTROCARDIOGRAM REPORT: CPT | Performed by: INTERNAL MEDICINE

## 2020-08-15 PROCEDURE — 6370000000 HC RX 637 (ALT 250 FOR IP): Performed by: INTERNAL MEDICINE

## 2020-08-15 PROCEDURE — 2580000003 HC RX 258: Performed by: SURGERY

## 2020-08-15 PROCEDURE — 96372 THER/PROPH/DIAG INJ SC/IM: CPT

## 2020-08-15 PROCEDURE — 36415 COLL VENOUS BLD VENIPUNCTURE: CPT

## 2020-08-15 PROCEDURE — 96366 THER/PROPH/DIAG IV INF ADDON: CPT

## 2020-08-15 PROCEDURE — 6360000002 HC RX W HCPCS: Performed by: SURGERY

## 2020-08-15 PROCEDURE — 2700000000 HC OXYGEN THERAPY PER DAY

## 2020-08-15 PROCEDURE — 84484 ASSAY OF TROPONIN QUANT: CPT

## 2020-08-15 PROCEDURE — 99253 IP/OBS CNSLTJ NEW/EST LOW 45: CPT | Performed by: SURGERY

## 2020-08-15 PROCEDURE — 82962 GLUCOSE BLOOD TEST: CPT

## 2020-08-15 PROCEDURE — G0378 HOSPITAL OBSERVATION PER HR: HCPCS

## 2020-08-15 RX ORDER — NITROGLYCERIN 0.4 MG/1
0.4 TABLET SUBLINGUAL EVERY 5 MIN PRN
Status: DISCONTINUED | OUTPATIENT
Start: 2020-08-15 | End: 2020-08-16 | Stop reason: HOSPADM

## 2020-08-15 RX ORDER — MAGNESIUM SULFATE IN WATER 40 MG/ML
2 INJECTION, SOLUTION INTRAVENOUS ONCE
Status: COMPLETED | OUTPATIENT
Start: 2020-08-15 | End: 2020-08-15

## 2020-08-15 RX ORDER — CARVEDILOL 3.12 MG/1
3.12 TABLET ORAL 2 TIMES DAILY WITH MEALS
Status: DISCONTINUED | OUTPATIENT
Start: 2020-08-15 | End: 2020-08-16 | Stop reason: HOSPADM

## 2020-08-15 RX ORDER — CEFAZOLIN SODIUM 2 G/50ML
2 SOLUTION INTRAVENOUS EVERY 8 HOURS
Status: DISCONTINUED | OUTPATIENT
Start: 2020-08-15 | End: 2020-08-15 | Stop reason: CLARIF

## 2020-08-15 RX ORDER — HYDROMORPHONE HCL 110MG/55ML
1 PATIENT CONTROLLED ANALGESIA SYRINGE INTRAVENOUS
Status: DISCONTINUED | OUTPATIENT
Start: 2020-08-15 | End: 2020-08-16 | Stop reason: HOSPADM

## 2020-08-15 RX ADMIN — CEFAZOLIN 2 G: 10 INJECTION, POWDER, FOR SOLUTION INTRAVENOUS at 20:31

## 2020-08-15 RX ADMIN — INSULIN LISPRO 1 UNITS: 100 INJECTION, SOLUTION INTRAVENOUS; SUBCUTANEOUS at 03:59

## 2020-08-15 RX ADMIN — CARVEDILOL 3.12 MG: 3.12 TABLET, FILM COATED ORAL at 13:35

## 2020-08-15 RX ADMIN — INSULIN LISPRO 1 UNITS: 100 INJECTION, SOLUTION INTRAVENOUS; SUBCUTANEOUS at 08:40

## 2020-08-15 RX ADMIN — PANTOPRAZOLE SODIUM 40 MG: 40 TABLET, DELAYED RELEASE ORAL at 08:39

## 2020-08-15 RX ADMIN — ENOXAPARIN SODIUM 40 MG: 40 INJECTION SUBCUTANEOUS at 08:43

## 2020-08-15 RX ADMIN — LISINOPRIL 10 MG: 10 TABLET ORAL at 08:39

## 2020-08-15 RX ADMIN — SODIUM CHLORIDE: 9 INJECTION, SOLUTION INTRAVENOUS at 09:52

## 2020-08-15 RX ADMIN — INSULIN GLARGINE 15 UNITS: 100 INJECTION, SOLUTION SUBCUTANEOUS at 21:04

## 2020-08-15 RX ADMIN — METHOCARBAMOL TABLETS 750 MG: 500 TABLET, COATED ORAL at 13:35

## 2020-08-15 RX ADMIN — ASPIRIN 81 MG CHEWABLE TABLET 81 MG: 81 TABLET CHEWABLE at 08:39

## 2020-08-15 RX ADMIN — TRAMADOL HYDROCHLORIDE 50 MG: 50 TABLET, FILM COATED ORAL at 20:30

## 2020-08-15 RX ADMIN — TRAMADOL HYDROCHLORIDE 50 MG: 50 TABLET, FILM COATED ORAL at 03:58

## 2020-08-15 RX ADMIN — LISINOPRIL 10 MG: 10 TABLET ORAL at 03:58

## 2020-08-15 RX ADMIN — HYDROMORPHONE HYDROCHLORIDE 1 MG: 2 INJECTION INTRAMUSCULAR; INTRAVENOUS; SUBCUTANEOUS at 14:03

## 2020-08-15 RX ADMIN — INSULIN LISPRO 1 UNITS: 100 INJECTION, SOLUTION INTRAVENOUS; SUBCUTANEOUS at 13:34

## 2020-08-15 RX ADMIN — ATORVASTATIN CALCIUM 40 MG: 40 TABLET, FILM COATED ORAL at 20:31

## 2020-08-15 RX ADMIN — METHOCARBAMOL TABLETS 750 MG: 500 TABLET, COATED ORAL at 20:30

## 2020-08-15 RX ADMIN — SODIUM CHLORIDE, PRESERVATIVE FREE 10 ML: 5 INJECTION INTRAVENOUS at 20:31

## 2020-08-15 RX ADMIN — METHOCARBAMOL TABLETS 750 MG: 500 TABLET, COATED ORAL at 08:39

## 2020-08-15 RX ADMIN — INSULIN GLARGINE 15 UNITS: 100 INJECTION, SOLUTION SUBCUTANEOUS at 03:58

## 2020-08-15 RX ADMIN — INSULIN LISPRO 1 UNITS: 100 INJECTION, SOLUTION INTRAVENOUS; SUBCUTANEOUS at 18:32

## 2020-08-15 RX ADMIN — DICYCLOMINE HYDROCHLORIDE 10 MG: 10 CAPSULE ORAL at 20:31

## 2020-08-15 RX ADMIN — MAGNESIUM SULFATE HEPTAHYDRATE 2 G: 40 INJECTION, SOLUTION INTRAVENOUS at 13:34

## 2020-08-15 RX ADMIN — CARVEDILOL 3.12 MG: 3.12 TABLET, FILM COATED ORAL at 18:33

## 2020-08-15 ASSESSMENT — PAIN SCALES - GENERAL
PAINLEVEL_OUTOF10: 0
PAINLEVEL_OUTOF10: 5
PAINLEVEL_OUTOF10: 2
PAINLEVEL_OUTOF10: 8
PAINLEVEL_OUTOF10: 9
PAINLEVEL_OUTOF10: 6

## 2020-08-15 ASSESSMENT — PAIN - FUNCTIONAL ASSESSMENT: PAIN_FUNCTIONAL_ASSESSMENT: ACTIVITIES ARE NOT PREVENTED

## 2020-08-15 ASSESSMENT — PAIN DESCRIPTION - FREQUENCY: FREQUENCY: INTERMITTENT

## 2020-08-15 ASSESSMENT — PAIN DESCRIPTION - PROGRESSION: CLINICAL_PROGRESSION: NOT CHANGED

## 2020-08-15 ASSESSMENT — PAIN DESCRIPTION - PAIN TYPE: TYPE: ACUTE PAIN

## 2020-08-15 ASSESSMENT — PAIN DESCRIPTION - LOCATION: LOCATION: RIB CAGE

## 2020-08-15 ASSESSMENT — PAIN DESCRIPTION - ORIENTATION: ORIENTATION: RIGHT

## 2020-08-15 ASSESSMENT — PAIN DESCRIPTION - DESCRIPTORS: DESCRIPTORS: SHARP

## 2020-08-15 NOTE — CONSULTS
CARDIOLOGY CONSULT NOTE   Reason for consultation:  Pre syncope / diaphoresis    Referring physician:  Dany Solis MD     Primary care physician: Ana Sinha      Dear  Dr. Dany Solis MD   Thanks for the consult. Chief Complaints :  Chief Complaint   Patient presents with    Chest Pain        History of present illness:Mauricio is a 61 y. o.year old who has been having right flank pain for several years now. 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  He started having shortness of breath nausea, diaphoresis he denies any chest pain  But he felt very nauseous and sick to stomach was advised to go home his wife says that she found him next to his truck on the ground looking lethargic and sick. He denies any chest pain but mostly complains of right flank pain quite adamant he wants to be discharged. Still having right flank pain which is ongoing for some time CT scan shows multiple lung nodules. EKG shows frequent PVCs  Past medical history:    has a past medical history of Colitis, Diabetes mellitus ([address] DJD (degenerative joint disease) of lumbar spine, External hemorrhoids, Family history of lung cancer, Gallstones, GERD (gastroesophageal reflux disease), H/O cardiovascular stress test, Hepatitis C, Hilar adenopathy, Hypertension, Lipoma, Multiple lung nodules, Polyp, sigmoid colon, Pulmonary nodules, Sleep apnea, obstructive, and Vertigo. Past surgical history:   has a past surgical history that includes laparoscopic appendectomy (7/12/2013) and Colonoscopy (3-). Social History:   reports that he has never smoked. He has never used smokeless tobacco. He reports current alcohol use. He reports that he does not use drugs.   Family history:   no family history of CAD, STROKE of DM at early age    Allergies   Allergen Reactions    Lansoprazole      diarrhea  cramping sulfate (PF) injection 4 mg  4 mg Intravenous Q30 Min PRN Ashley Sifuentes DO   4 mg at 08/14/20 1636    dicyclomine (BENTYL) capsule 10 mg  10 mg Oral TID PRN Kiera Prado MD        insulin glargine (LANTUS) injection vial 15 Units  15 Units Subcutaneous Nightly Kiera Prado MD   15 Units at 08/15/20 0358    lisinopril (PRINIVIL;ZESTRIL) tablet 10 mg  10 mg Oral Daily Kiera Prado MD   10 mg at 08/15/20 0775    meclizine (ANTIVERT) tablet 25 mg  25 mg Oral TID PRN Kiera Prado MD        methocarbamol (ROBAXIN) tablet 750 mg  750 mg Oral TID Kiera Prado MD   750 mg at 08/15/20 0839    pantoprazole (PROTONIX) tablet 40 mg  40 mg Oral QAM AC Kiera Prado MD   40 mg at 08/15/20 0839    traMADol (ULTRAM) tablet 50 mg  50 mg Oral Q6H PRN Kiera Prado MD   50 mg at 08/15/20 0358    sodium chloride flush 0.9 % injection 10 mL  10 mL Intravenous 2 times per day Kiera Prado MD   10 mL at 08/14/20 2156    sodium chloride flush 0.9 % injection 10 mL  10 mL Intravenous PRN Kiera Prado MD        acetaminophen (TYLENOL) tablet 650 mg  650 mg Oral Q6H PRN Kiera Prado MD        Or    acetaminophen (TYLENOL) suppository 650 mg  650 mg Rectal Q6H PRN Kiera Prado MD        polyethylene glycol [facility]) packet 17 g  17 g Oral Daily PRN Kiera Prado MD        promethazine (PHENERGAN) tablet 12.5 mg  12.5 mg Oral Q6H PRN Kiera Prado MD        Or    ondansetron [facility]) injection 4 mg  4 mg Intravenous Q6H PRN Kiera Prado MD        atorvastatin (LIPITOR) tablet 40 mg  40 mg Oral Nightly Kiera Prado MD   40 mg at 08/14/20 2155    aspirin chewable tablet 81 mg  81 mg Oral Daily Kiera Prado MD   81 mg at 08/15/20 0839    enoxaparin (LOVENOX) injection 40 mg  40 mg Subcutaneous Daily Kiera Prado MD   40 mg at 08/15/20 0843    nitroGLYCERIN (NITROSTAT) SL tablet 0.4 mg  0.4 mg Sublingual Q5 Min PRN Kiera Prado MD        insulin lispro (HUMALOG) injection vial 0-6 Units  0-6 Units Subcutaneous TID WC Nella Herring MD   1 Units at 08/15/20 0840    insulin lispro (HUMALOG) injection vial 0-3 Units  0-3 Units Subcutaneous Nightly Nella Herring MD   2 Units at 08/14/20 2159    0.9 % sodium chloride infusion   Intravenous Continuous Nella Herring  mL/hr at 08/15/20 8100       Review of Systems:   · Constitutional: No Fever or Weight Loss   · Eyes: No Decreased Vision  · ENT: No Headaches, Hearing Loss or Vertigo  · Cardiovascular: As per HPI  · Respiratory: As per HPI  · Gastrointestinal: No abdominal pain, appetite loss, blood in stools, constipation, diarrhea or heartburn  · Genitourinary: No dysuria, trouble voiding, or hematuria  · Musculoskeletal:  No gait disturbance, weakness or joint complaints  · Integumentary: No rash or pruritis  · Neurological: No TIA or stroke symptoms  · Psychiatric: No anxiety or depression  · Endocrine: No malaise, fatigue or temperature intolerance  · Hematologic/Lymphatic: No bleeding problems, blood clots or swollen lymph nodes  · Allergic/Immunologic: No nasal congestion or hives  All systems negative except as marked. Physical Examination:    Vitals:    08/14/20 1825 08/14/20 1856 08/15/20 0400 08/15/20 0835   BP: (!) 139/93 138/83 120/78 109/80   Pulse: 103 97 78 84   Resp:  15 16 18   Temp:  97.8 °F (36.6 °C) 97.8 °F (36.6 °C) 98.2 °F (36.8 °C)   TempSrc:  Oral Oral Oral   SpO2:   97% 98%   Weight:  224 lb 6.4 oz (101.8 kg)     Height:  6' (1.829 m)         General Appearance:  No distress, conversant    Constitutional:  Well developed, Well nourished, No acute distress, Non-toxic appearance. HENT:  Normocephalic, Atraumatic, Bilateral external ears normal, Oropharynx moist, No oral exudates, Nose normal. Neck- Normal range of motion, No tenderness, Supple, No stridor,no apical-carotid delay  Lymphatics : no palpable lymph nodes  Eyes:  PERRL, EOMI, Conjunctiva normal, No discharge. Respiratory:  Normal breath sounds, No respiratory distress, No wheezing, No chest tenderness. ,no use of accessory muscles, crackles Absent   Cardiovascular: (PMI) apex non displaced,no lifts no thrills, ankle swelling Absent  , 1+, s1 and s2 audible,Murmur. Absent , JVD not noted    Abdomen /GI:  Bowel sounds normal, Soft, No tenderness, No masses, No gross visceromegaly   :  No costovertebral angle tenderness   Musculoskeletal:  No edema, no tenderness, no deformities. Back- no tenderness  Integument:  Well hydrated, no rash   Lymphatic:  No lymphadenopathy noted   Neurologic:  Alert & oriented x 3, CN 2-12 normal, normal motor function, normal sensory function, no focal deficits noted           Medical decision making and Data review:    Lab Review   Recent Labs     08/15/20  0144   WBC 18.7*   HGB 15.4   HCT 46.1   *      Recent Labs     08/14/20  1626      K 3.6      CO2 18*   BUN 12   CREATININE 0.5*     Recent Labs     08/14/20  1626   AST 17   ALT 22   BILITOT 0.5   ALKPHOS 116     Recent Labs     08/14/20  1626 08/15/20  0144 08/15/20  0737   TROPONINT <0.010 <0.010 <0.010       Recent Labs     08/14/20  1626   PROBNP 23.99     No results found for: INR, PROTIME    EKG: (reviewed by myself)    ECHO:(reviewed by myself)    Chest Xray:(reviewed by myself)  Xr Chest (2 Vw)    Result Date: 8/14/2020  EXAMINATION: TWO XRAY VIEWS OF THE CHEST 8/14/2020 1:11 pm COMPARISON: Right rib series August 22, 2018; CT thorax April 26, 2010; PET-CT December 23, 2009 HISTORY: ORDERING SYSTEM PROVIDED HISTORY: Rib pain on right side TECHNOLOGIST PROVIDED HISTORY: Reason for exam:->Right lower rib cage pain Acuity: Acute Type of Exam: Initial Additional signs and symptoms: Rib pain bilateral sides without specific injury. Patient also having CT scan today. FINDINGS: Two views of the chest were reviewed.   Redemonstration of left-sided lung nodules with the more superior measuring 1.6 cm and the more inferior measuring 1.5 cm. These appear grossly similar in size when compared with radiograph from August 22, 2018 accounting for differences in measuring technique and have slightly increased in size when compared with CT chest from April 26, 2010. Lungs demonstrate chronic interstitial changes with no focal consolidation, pleural effusion, or pneumothorax identified. Osseous structures appear intact with no acute rib fracture identified within limits of radiograph. Chronic appearing fracture of the right 4th rib which appears similar to prior radiograph from April 22, 2018 and CT chest from 2010. 1. No acute right-sided rib fracture identified. 2. No acute cardiopulmonary process identified. 3. Redemonstration of left-sided pulmonary nodules measuring approximately 1.6 and 1.5 cm with overall grossly stable appearance compared with radiograph from August 22, 2018 accounting for differences in measuring technique. Ct Abdomen W Contrast Additional Contrast? Oral    Result Date: 8/14/2020  EXAMINATION: CT ABDOMEN WITH CONTRAST 8/14/2020 1:14 pm TECHNIQUE: CT of the abdomen was performed with the administration of intravenous contrast. Multiplanar reformatted images are provided for review. Dose modulation, iterative reconstruction, and/or weight based adjustment of the mA/kV was utilized to reduce the radiation dose to as low as reasonably achievable.  COMPARISON: 03/03/2015 HISTORY: ORDERING SYSTEM PROVIDED HISTORY: Rib pain on right side TECHNOLOGIST PROVIDED HISTORY: With only per Dr. Carine Harmon Additional Contrast?->Oral Reason for exam:->Pain on the underside of right rib cage Reason for Exam: Rib pain RT side Acuity: Chronic Type of Exam: Ongoing Additional signs and symptoms: patient states the RT rib pain has been going on x 2 years; patient states doctor previously told him he had a broken rib; patient states bilat pain x 6 months tand LBP that started this morning Relevant Medical/Surgical History: patient states the RT rib pain has been going on x 2 years; patient states doctor previously told him he had a broken rib; patient states bilat pain x 6 months tand LBP that started this morning FINDINGS: Lower Chest: Lung bases are clear. Organs: The liver, spleen, pancreas and adrenal glands are without focal abnormality. There is cholelithiasis. No biliary duct dilation. The kidneys enhance symmetrically. No renal or ureteral calculus. No hydronephrosis or perinephric stranding. GI/Bowel: The visualized bowel demonstrates no dilation or wall thickening. No extraluminal contrast or free air. A few scattered colonic diverticula without acute diverticulitis. The aorta is normal in caliber. The celiac axis, SMA and BEE are patent. The portal venous system is patent. No pathologically enlarged adenopathy. There is mild stranding within the mesentery. Peritoneum/Retroperitoneum: Nonspecific similar to the prior exam but more conspicuous on today's exam.  No ascites or drainable fluid collection. Bones/Soft Tissues: No acute findings in the bones or soft tissues. Degenerative changes of the thoracolumbar spine. No acute abdominal abnormality. Xr Chest Portable    Result Date: 8/14/2020  EXAMINATION: ONE XRAY VIEW OF THE CHEST 8/14/2020 5:17 pm COMPARISON: 08/14/2020, earlier today HISTORY: ORDERING SYSTEM PROVIDED HISTORY: CP TECHNOLOGIST PROVIDED HISTORY: Reason for exam:->CP Reason for Exam: chest pain Acuity: Acute Type of Exam: Initial Additional signs and symptoms: na Relevant Medical/Surgical History: na FINDINGS: No focal consolidation, pleural effusion or pneumothorax. There is a 12 mm left pulmonary nodule slightly decreased in size when compared to the prior exam.  2nd left pulmonary nodule is not well seen on this study. The cardiomediastinal silhouette is stable. No overt pulmonary edema. The osseous structures are stable. No acute cardiopulmonary findings.  12 mm left pulmonary nodule slightly decreased in size when compared to the prior exam.  2nd pulmonary nodule in the left lung is not well seen on this study. Cta Chest W Contrast    Result Date: 8/14/2020  EXAMINATION: CTA OF THE ABDOMEN AND PELVIS WITH CONTRAST; CTA OF THE CHEST 8/14/2020 6:13 pm: TECHNIQUE: CTA of the abdomen and pelvis was performed with the administration of intravenous contrast. Multiplanar reformatted images are provided for review. MIP images are provided for review. Dose modulation, iterative reconstruction, and/or weight based adjustment of the mA/kV was utilized to reduce the radiation dose to as low as reasonably achievable.; CTA of the chest was performed after the administration of intravenous contrast. Multiplanar reformatted images are provided for review. MIP images are provided for review. Dose modulation, iterative reconstruction, and/or weight based adjustment of the mA/kV was utilized to reduce the radiation dose to as low as reasonably achievable. COMPARISON: 08/14/2020, earlier today, 03/03/2015, 04/26/2010 HISTORY: ORDERING SYSTEM PROVIDED HISTORY: abd pain/diaphoresis TECHNOLOGIST PROVIDED HISTORY: Reason for exam:->abd pain/diaphoresis Reason for Exam: abd pain/diaphoresis/ emesis Acuity: Acute Type of Exam: Initial Relevant Medical/Surgical History: pt had ct abd/pel w/c earlier today, hx hepatitis C, colitis; ORDERING SYSTEM PROVIDED HISTORY: other TECHNOLOGIST PROVIDED HISTORY: Reason for exam:->other Reason for Exam: chest pain/ abd pain/ nausea/vomiting Acuity: Acute Type of Exam: Initial Relevant Medical/Surgical History: patient had ct abd/pel w/c earlier today, hx hepatitis C, colitis FINDINGS: CTA CHEST: Thoracic aorta is normal in caliber. No acute aortic dissection or pseudoaneurysm. Mild aortic atherosclerosis. The main pulmonary artery is normal in caliber without subtle embolus. Coronary arterial calcifications. The heart size is within normal limits.   Trace pericardial Exam: chest pain/ abd pain/ nausea/vomiting Acuity: Acute Type of Exam: Initial Relevant Medical/Surgical History: patient had ct abd/pel w/c earlier today, hx hepatitis C, colitis FINDINGS: CTA CHEST: Thoracic aorta is normal in caliber. No acute aortic dissection or pseudoaneurysm. Mild aortic atherosclerosis. The main pulmonary artery is normal in caliber without subtle embolus. Coronary arterial calcifications. The heart size is within normal limits. Trace pericardial effusion. No pathologically enlarged adenopathy. Bibasilar dependent atelectasis. There is a stable 16 mm nodule within the medial right upper lobe along the hilum (image 48). There is a partially calcified 12 mm nodule in the left upper lobe slightly increased from 2010 but more calcified when compared to that exam suggestive of benign process. There has also been slight interval increase in size of a nodule in the posterior superior left lower lobe measuring 12 mm previously measuring 7 mm (image 53) also demonstrating increased calcifications. Slowly increasing size of a 13 mm cavitary nodule within the left lower lobe previously measuring 8 mm on 03/03/2015. There are several other subcentimeter solid nodules which are not significantly changed (for example on the right images 66, 69; and on the left images 67 and 75). There is also a new 7 mm cavitary nodule in the anterior right upper lobe (image 37). Many of these nodules including the smaller nodules demonstrate associated calcification. No pleural effusion or pneumothorax. The central airways are patent. The esophagus is mildly patulous. Osteopenia. No definite acute osseous abnormality. CTA ABDOMEN: The abdominal aorta is normal in caliber. The branch vessels are patent. The liver, spleen, pancreas, and kidneys and adrenal glands are without focal abnormality when compared to earlier exam.  No dilated loops of bowel or bowel wall thickening.   No extraluminal contrast or free air. The appendix is not visualized. There is cholelithiasis. No pathologically enlarged adenopathy. No ascites or drainable fluid collection. Osteopenia. No acute osseous abnormality. CTA PELVIS: Arterial vessels are patent without stenosis. No pathologically enlarged adenopathy or free fluid. No bladder wall thickening or filling defect. Mild prostatic hypertrophy. No acute osseous abnormality. Osteopenia. 1. Normal caliber aorta without acute aortic pathology. 2. Multiple pulmonary nodules bilaterally some of which are new since 2015 or 2010 or have increased in size. Some of these are partially cavitary in appearance. However, there has been interval development of calcification involving majority of these nodules favoring a benign process and healed infectious or inflammatory process. Recommend correlation with any history of malignancy. Otherwise these can be followed per clinical protocol or in 3-6 months. 3. Dependent atelectasis with no other acute cardiopulmonary findings. 4. No acute findings in the abdomen or pelvis. 5. Cholelithiasis. All labs, medications and tests reviewed by myself including data  from outside source , patient and available family . Continue all other medications of all above medical condition listed as is. Impression:  Active Problems:    Chest pain    Abdominal pain    Leukocytosis    Nausea and vomiting    CCC (chronic calculous cholecystitis)    Acute calculous cholecystitis  Resolved Problems:    * No resolved hospital problems. *      Assessment: 61 y. o.year old with PMH of  has a past medical history of Colitis, Diabetes mellitus ([address] DJD (degenerative joint disease) of lumbar spine, External hemorrhoids, Family history of lung cancer, Gallstones, GERD (gastroesophageal reflux disease), H/O cardiovascular stress test, Hepatitis C, Hilar adenopathy, Hypertension, Lipoma, Multiple lung nodules, Polyp, sigmoid colon, Pulmonary nodules, Sleep apnea, obstructive, and Vertigo. Plan and Recommendations:    Frequent PVC with associated nausea vomiting and diaphoresis will need an echo and a stress test  If he gets discharged we can plan it as an outpatient serial troponins are normal  Replace magnesium check TSH  Chest Pain: serial cardiac enzymes, EKG reviewed, further plan as per enzymes and clinical course  Add carvedilol  Right flank pain and pulmonary nodules as per primary team  DVT prophylaxis if no contraindication  6. Dyslipidemia: continue statins           Thank you  much for consult and giving us the opportunity in contributing in the care of this patient. Please feel free to call me for any questions.        Waleska Patel MD, 8/15/2020 11:27 AM

## 2020-08-15 NOTE — PROGRESS NOTES
Hospitalist Progress Note         Admit Date: 8/14/2020    PCP: Mery Jain DO     Chief Complaint   Patient presents with    Chest Pain        Assessment and Plan:      Chest pain rule out ACS. Continue aspirin, statin, low-dose Coreg. Cardiology planning for stress test and echo. Continue monitor   Abdominal pain/Leukocytosis/Nausea and vomiting: Zofran and Protonix IV. Surgery consult was placed. Patient probably will need cholecystectomy.  Acute calculous cholecystitis defer plans as per surgery.  Diabetes mellitus continue Lantus and SSI. Hold oral meds.   Follow Accu-Cheks    VTE prophylaxis LMWH    Current Facility-Administered Medications   Medication Dose Route Frequency Provider Last Rate Last Dose    magnesium sulfate 2 g in 50 mL IVPB premix  2 g Intravenous Once Fernanda Calvin MD        carvedilol (COREG) tablet 3.125 mg  3.125 mg Oral BID WC Fernanda Calvin MD        nitroGLYCERIN (NITROSTAT) SL tablet 0.4 mg  0.4 mg Sublingual Q5 Min PRN Fernanda Calvin MD        ceFAZolin (ANCEF) 2 g in dextrose 5 % 100 mL IVPB  2 g Intravenous Q8H Magued Malik Infante MD        HYDROmorphone (DILAUDID) injection 1 mg  1 mg Intravenous Q2H PRN Bonilla Braun MD        ondansetron [facility]) injection 4 mg  4 mg Intravenous Q30 Min PRN Clorinda Belts, DO        dicyclomine (BENTYL) injection 20 mg  20 mg Intramuscular Once Clorinda Belts, DO        morphine sulfate (PF) injection 4 mg  4 mg Intravenous Q30 Min PRN Clorinda Belts, DO   4 mg at 08/14/20 1636    dicyclomine (BENTYL) capsule 10 mg  10 mg Oral TID PRN Zafar Renteria MD        insulin glargine (LANTUS) injection vial 15 Units  15 Units Subcutaneous Nightly Zafar Renteria MD   15 Units at 08/15/20 0358    lisinopril (PRINIVIL;ZESTRIL) tablet 10 mg  10 mg Oral Daily Zafar Renteria MD   10 mg at 08/15/20 0839    meclizine (ANTIVERT) tablet 25 mg  25 mg Oral TID PRN Zafar Renteria MD        methocarbamol (ROBAXIN) tablet 750 mg  750 mg Oral TID Lillian Cho MD   750 mg at 08/15/20 0839    pantoprazole (PROTONIX) tablet 40 mg  40 mg Oral QAM AC Ceedia Cough, MD   40 mg at 08/15/20 0839    traMADol (ULTRAM) tablet 50 mg  50 mg Oral Q6H PRN Dayannaocadia Cough, MD   50 mg at 08/15/20 0358    sodium chloride flush 0.9 % injection 10 mL  10 mL Intravenous 2 times per day Dayannaocadikaylen Cough, MD   10 mL at 08/14/20 2156    sodium chloride flush 0.9 % injection 10 mL  10 mL Intravenous PRN Leocadia Cough, MD        acetaminophen (TYLENOL) tablet 650 mg  650 mg Oral Q6H PRN Dayannaocadia Cough, MD        Or    acetaminophen (TYLENOL) suppository 650 mg  650 mg Rectal Q6H PRN Leocadia Cough, MD        polyethylene glycol [facility]) packet 17 g  17 g Oral Daily PRN Dayannaocadia Cough, MD        promethazine (PHENERGAN) tablet 12.5 mg  12.5 mg Oral Q6H PRN Leocadia Cough, MD        Or    ondansetron [facility]) injection 4 mg  4 mg Intravenous Q6H PRN Leocadia Cough, MD        atorvastatin (LIPITOR) tablet 40 mg  40 mg Oral Nightly Lillian Cho MD   40 mg at 08/14/20 2155    aspirin chewable tablet 81 mg  81 mg Oral Daily Lillian Cho MD   81 mg at 08/15/20 0839    enoxaparin (LOVENOX) injection 40 mg  40 mg Subcutaneous Daily Ceedikaylen Cho MD   40 mg at 08/15/20 0843    nitroGLYCERIN (NITROSTAT) SL tablet 0.4 mg  0.4 mg Sublingual Q5 Min PRN Dayannaocadia Cough, MD        insulin lispro (HUMALOG) injection vial 0-6 Units  0-6 Units Subcutaneous TID WC Dayannaocadikaylen Cough, MD   1 Units at 08/15/20 0840    insulin lispro (HUMALOG) injection vial 0-3 Units  0-3 Units Subcutaneous Nightly Dayannaocadikaylen Cho MD   2 Units at 08/14/20 2159    0.9 % sodium chloride infusion   Intravenous Continuous Dayannaocadia Cough,  mL/hr at 08/15/20 5793         Subjective:     Patient reported intermittent right upper quadrant pain. Currently reports pain as crampy 4/10 with associated nausea. He also reported some diarrhea. Denied any significant chest pain right now. Objective:   No intake or output data in the 24 hours ending 08/15/20 1240   Vitals:   Vitals:    08/15/20 0835   BP: 109/80   Pulse: 84   Resp: 18   Temp: 98.2 °F (36.8 °C)   SpO2: 98%     Physical Exam:  General Appearance:    Alert, cooperative, mild distress +  Head:      Normocephalic, without obvious abnormality, atraumatic  Eyes:       Conjunctiva/corneas clear, EOM's intact  Lungs:    CTA B/L +  Heart:                RRR, S1 and S2 normal, no murmur,   rub or gallop  Abdomen:     Soft, ND/RUQ tenderness +, BS +  Extremities:   Extremities normal, atraumatic, no cyanosis or edema  Neurological:   Grossly Intact. Significant Diagnostic Studies:   DATA:    CBC   Recent Labs     08/14/20  1626 08/15/20  0144   WBC 12.5* 18.7*   HGB 17.5 15.4   HCT 51.6 46.1   * 136*      BMP   Recent Labs     08/14/20  1337 08/14/20  1626   NA  --  137   K  --  3.6   CL  --  103   CO2  --  18*   BUN  --  12   CREATININE 0.6* 0.5*     LFT'S   Recent Labs     08/14/20  1626   AST 17   ALT 22   BILITOT 0.5   ALKPHOS 116     COAG No results for input(s): INR in the last 72 hours.   POC:   Lab Results   Component Value Date    POCGLU 198 08/15/2020    POCGLU 244 08/14/2020    POCGLU 197 08/14/2020    POCGLU 263 03/13/2015     [id number]:  Lab Results   Component Value Date    LABA1C 11.4 02/26/2020     CARDIAC ENZYMES    Recent Labs     08/14/20  1626   CKTOTAL 171     Troponin:   Recent Labs     08/14/20  1626 08/15/20  0144 08/15/20  0737   TROPONINT <0.010 <0.010 <0.010     BNP:   Recent Labs     08/14/20  1626   PROBNP 23.99     U/A:    Lab Results   Component Value Date    COLORU YELLOW 02/26/2020    WBCUA 1 09/27/2018    RBCUA 2 09/27/2018    RBCUA NO CELLS SEEN 07/11/2013    MUCUS RARE 07/11/2013    BACTERIA RARE 07/11/2013    CLARITYU Clear 02/26/2020    SPECGRAV >1.030 02/26/2020    LEUKOCYTESUR Negative 02/26/2020    BLOODU Negative 02/26/2020    GLUCOSEU >=1000 02/26/2020       Xr Chest (2 Vw)    Result Date: 8/14/2020  EXAMINATION: TWO XRAY VIEWS OF THE CHEST 8/14/2020 1:11 pm COMPARISON: Right rib series August 22, 2018; CT thorax April 26, 2010; PET-CT December 23, 2009 HISTORY: ORDERING SYSTEM PROVIDED HISTORY: Rib pain on right side TECHNOLOGIST PROVIDED HISTORY: Reason for exam:->Right lower rib cage pain Acuity: Acute Type of Exam: Initial Additional signs and symptoms: Rib pain bilateral sides without specific injury. Patient also having CT scan today. FINDINGS: Two views of the chest were reviewed. Redemonstration of left-sided lung nodules with the more superior measuring 1.6 cm and the more inferior measuring 1.5 cm. These appear grossly similar in size when compared with radiograph from August 22, 2018 accounting for differences in measuring technique and have slightly increased in size when compared with CT chest from April 26, 2010. Lungs demonstrate chronic interstitial changes with no focal consolidation, pleural effusion, or pneumothorax identified. Osseous structures appear intact with no acute rib fracture identified within limits of radiograph. Chronic appearing fracture of the right 4th rib which appears similar to prior radiograph from April 22, 2018 and CT chest from 2010. 1. No acute right-sided rib fracture identified. 2. No acute cardiopulmonary process identified. 3. Redemonstration of left-sided pulmonary nodules measuring approximately 1.6 and 1.5 cm with overall grossly stable appearance compared with radiograph from August 22, 2018 accounting for differences in measuring technique. Ct Abdomen W Contrast Additional Contrast? Oral    Result Date: 8/14/2020  EXAMINATION: CT ABDOMEN WITH CONTRAST 8/14/2020 1:14 pm TECHNIQUE: CT of the abdomen was performed with the administration of intravenous contrast. Multiplanar reformatted images are provided for review.  Dose modulation, iterative reconstruction, and/or weight based adjustment of the mA/kV was utilized to reduce the radiation dose to as low as reasonably achievable. COMPARISON: 03/03/2015 HISTORY: ORDERING SYSTEM PROVIDED HISTORY: Rib pain on right side TECHNOLOGIST PROVIDED HISTORY: With only per Dr. Todd Upton Additional Contrast?->Oral Reason for exam:->Pain on the underside of right rib cage Reason for Exam: Rib pain RT side Acuity: Chronic Type of Exam: Ongoing Additional signs and symptoms: patient states the RT rib pain has been going on x 2 years; patient states doctor previously told him he had a broken rib; patient states bilat pain x 6 months tand LBP that started this morning Relevant Medical/Surgical History: patient states the RT rib pain has been going on x 2 years; patient states doctor previously told him he had a broken rib; patient states bilat pain x 6 months tand LBP that started this morning FINDINGS: Lower Chest: Lung bases are clear. Organs: The liver, spleen, pancreas and adrenal glands are without focal abnormality. There is cholelithiasis. No biliary duct dilation. The kidneys enhance symmetrically. No renal or ureteral calculus. No hydronephrosis or perinephric stranding. GI/Bowel: The visualized bowel demonstrates no dilation or wall thickening. No extraluminal contrast or free air. A few scattered colonic diverticula without acute diverticulitis. The aorta is normal in caliber. The celiac axis, SMA and BEE are patent. The portal venous system is patent. No pathologically enlarged adenopathy. There is mild stranding within the mesentery. Peritoneum/Retroperitoneum: Nonspecific similar to the prior exam but more conspicuous on today's exam.  No ascites or drainable fluid collection. Bones/Soft Tissues: No acute findings in the bones or soft tissues. Degenerative changes of the thoracolumbar spine. No acute abdominal abnormality.      Xr Chest Portable    Result Date: 8/14/2020  EXAMINATION: ONE XRAY VIEW OF THE CHEST 8/14/2020 5:17 pm COMPARISON: 08/14/2020, earlier today HISTORY: ORDERING SYSTEM PROVIDED HISTORY: CP TECHNOLOGIST PROVIDED HISTORY: Reason for exam:->CP Reason for Exam: chest pain Acuity: Acute Type of Exam: Initial Additional signs and symptoms: na Relevant Medical/Surgical History: na FINDINGS: No focal consolidation, pleural effusion or pneumothorax. There is a 12 mm left pulmonary nodule slightly decreased in size when compared to the prior exam.  2nd left pulmonary nodule is not well seen on this study. The cardiomediastinal silhouette is stable. No overt pulmonary edema. The osseous structures are stable. No acute cardiopulmonary findings. 12 mm left pulmonary nodule slightly decreased in size when compared to the prior exam.  2nd pulmonary nodule in the left lung is not well seen on this study. Cta Chest W Contrast    Result Date: 8/14/2020  EXAMINATION: CTA OF THE ABDOMEN AND PELVIS WITH CONTRAST; CTA OF THE CHEST 8/14/2020 6:13 pm: TECHNIQUE: CTA of the abdomen and pelvis was performed with the administration of intravenous contrast. Multiplanar reformatted images are provided for review. MIP images are provided for review. Dose modulation, iterative reconstruction, and/or weight based adjustment of the mA/kV was utilized to reduce the radiation dose to as low as reasonably achievable.; CTA of the chest was performed after the administration of intravenous contrast. Multiplanar reformatted images are provided for review. MIP images are provided for review. Dose modulation, iterative reconstruction, and/or weight based adjustment of the mA/kV was utilized to reduce the radiation dose to as low as reasonably achievable.  COMPARISON: 08/14/2020, earlier today, 03/03/2015, 04/26/2010 HISTORY: ORDERING SYSTEM PROVIDED HISTORY: abd pain/diaphoresis TECHNOLOGIST PROVIDED HISTORY: Reason for exam:->abd pain/diaphoresis Reason for Exam: abd pain/diaphoresis/ emesis Acuity: Acute Type of Exam: Initial Relevant Medical/Surgical History: pt had ct abd/pel w/c earlier today, hx hepatitis C, colitis; ORDERING SYSTEM PROVIDED HISTORY: other TECHNOLOGIST PROVIDED HISTORY: Reason for exam:->other Reason for Exam: chest pain/ abd pain/ nausea/vomiting Acuity: Acute Type of Exam: Initial Relevant Medical/Surgical History: patient had ct abd/pel w/c earlier today, hx hepatitis C, colitis FINDINGS: CTA CHEST: Thoracic aorta is normal in caliber. No acute aortic dissection or pseudoaneurysm. Mild aortic atherosclerosis. The main pulmonary artery is normal in caliber without subtle embolus. Coronary arterial calcifications. The heart size is within normal limits. Trace pericardial effusion. No pathologically enlarged adenopathy. Bibasilar dependent atelectasis. There is a stable 16 mm nodule within the medial right upper lobe along the hilum (image 48). There is a partially calcified 12 mm nodule in the left upper lobe slightly increased from 2010 but more calcified when compared to that exam suggestive of benign process. There has also been slight interval increase in size of a nodule in the posterior superior left lower lobe measuring 12 mm previously measuring 7 mm (image 53) also demonstrating increased calcifications. Slowly increasing size of a 13 mm cavitary nodule within the left lower lobe previously measuring 8 mm on 03/03/2015. There are several other subcentimeter solid nodules which are not significantly changed (for example on the right images 66, 69; and on the left images 67 and 75). There is also a new 7 mm cavitary nodule in the anterior right upper lobe (image 37). Many of these nodules including the smaller nodules demonstrate associated calcification. No pleural effusion or pneumothorax. The central airways are patent. The esophagus is mildly patulous. Osteopenia. No definite acute osseous abnormality. CTA ABDOMEN: The abdominal aorta is normal in caliber. The branch vessels are patent.  The liver, spleen, pancreas, and kidneys and adrenal glands are without focal abnormality when compared to earlier exam.  No dilated loops of bowel or bowel wall thickening. No extraluminal contrast or free air. The appendix is not visualized. There is cholelithiasis. No pathologically enlarged adenopathy. No ascites or drainable fluid collection. Osteopenia. No acute osseous abnormality. CTA PELVIS: Arterial vessels are patent without stenosis. No pathologically enlarged adenopathy or free fluid. No bladder wall thickening or filling defect. Mild prostatic hypertrophy. No acute osseous abnormality. Osteopenia. 1. Normal caliber aorta without acute aortic pathology. 2. Multiple pulmonary nodules bilaterally some of which are new since 2015 or 2010 or have increased in size. Some of these are partially cavitary in appearance. However, there has been interval development of calcification involving majority of these nodules favoring a benign process and healed infectious or inflammatory process. Recommend correlation with any history of malignancy. Otherwise these can be followed per clinical protocol or in 3-6 months. 3. Dependent atelectasis with no other acute cardiopulmonary findings. 4. No acute findings in the abdomen or pelvis. 5. Cholelithiasis. Cta Abdomen Pelvis W Contrast    Result Date: 8/14/2020  EXAMINATION: CTA OF THE ABDOMEN AND PELVIS WITH CONTRAST; CTA OF THE CHEST 8/14/2020 6:13 pm: TECHNIQUE: CTA of the abdomen and pelvis was performed with the administration of intravenous contrast. Multiplanar reformatted images are provided for review. MIP images are provided for review. Dose modulation, iterative reconstruction, and/or weight based adjustment of the mA/kV was utilized to reduce the radiation dose to as low as reasonably achievable.; CTA of the chest was performed after the administration of intravenous contrast. Multiplanar reformatted images are provided for review.   MIP images are provided for review. Dose modulation, iterative reconstruction, and/or weight based adjustment of the mA/kV was utilized to reduce the radiation dose to as low as reasonably achievable. COMPARISON: 08/14/2020, earlier today, 03/03/2015, 04/26/2010 HISTORY: ORDERING SYSTEM PROVIDED HISTORY: abd pain/diaphoresis TECHNOLOGIST PROVIDED HISTORY: Reason for exam:->abd pain/diaphoresis Reason for Exam: abd pain/diaphoresis/ emesis Acuity: Acute Type of Exam: Initial Relevant Medical/Surgical History: pt had ct abd/pel w/c earlier today, hx hepatitis C, colitis; ORDERING SYSTEM PROVIDED HISTORY: other TECHNOLOGIST PROVIDED HISTORY: Reason for exam:->other Reason for Exam: chest pain/ abd pain/ nausea/vomiting Acuity: Acute Type of Exam: Initial Relevant Medical/Surgical History: patient had ct abd/pel w/c earlier today, hx hepatitis C, colitis FINDINGS: CTA CHEST: Thoracic aorta is normal in caliber. No acute aortic dissection or pseudoaneurysm. Mild aortic atherosclerosis. The main pulmonary artery is normal in caliber without subtle embolus. Coronary arterial calcifications. The heart size is within normal limits. Trace pericardial effusion. No pathologically enlarged adenopathy. Bibasilar dependent atelectasis. There is a stable 16 mm nodule within the medial right upper lobe along the hilum (image 48). There is a partially calcified 12 mm nodule in the left upper lobe slightly increased from 2010 but more calcified when compared to that exam suggestive of benign process. There has also been slight interval increase in size of a nodule in the posterior superior left lower lobe measuring 12 mm previously measuring 7 mm (image 53) also demonstrating increased calcifications. Slowly increasing size of a 13 mm cavitary nodule within the left lower lobe previously measuring 8 mm on 03/03/2015.   There are several other subcentimeter solid nodules which are not significantly changed (for example on the right images 66, 69; and on the left images 67 and 75). There is also a new 7 mm cavitary nodule in the anterior right upper lobe (image 37). Many of these nodules including the smaller nodules demonstrate associated calcification. No pleural effusion or pneumothorax. The central airways are patent. The esophagus is mildly patulous. Osteopenia. No definite acute osseous abnormality. CTA ABDOMEN: The abdominal aorta is normal in caliber. The branch vessels are patent. The liver, spleen, pancreas, and kidneys and adrenal glands are without focal abnormality when compared to earlier exam.  No dilated loops of bowel or bowel wall thickening. No extraluminal contrast or free air. The appendix is not visualized. There is cholelithiasis. No pathologically enlarged adenopathy. No ascites or drainable fluid collection. Osteopenia. No acute osseous abnormality. CTA PELVIS: Arterial vessels are patent without stenosis. No pathologically enlarged adenopathy or free fluid. No bladder wall thickening or filling defect. Mild prostatic hypertrophy. No acute osseous abnormality. Osteopenia. 1. Normal caliber aorta without acute aortic pathology. 2. Multiple pulmonary nodules bilaterally some of which are new since 2015 or 2010 or have increased in size. Some of these are partially cavitary in appearance. However, there has been interval development of calcification involving majority of these nodules favoring a benign process and healed infectious or inflammatory process. Recommend correlation with any history of malignancy. Otherwise these can be followed per clinical protocol or in 3-6 months. 3. Dependent atelectasis with no other acute cardiopulmonary findings. 4. No acute findings in the abdomen or pelvis. 5. Cholelithiasis.            Zoya McdonaldCurahealth Heritage Valleyist

## 2020-08-15 NOTE — CONSULTS
SURGICAL CONSULTATION    Date of Admission:  8/14/2020  Date of Consultation:  8/15/2020    PCP:  Mery Jain,   Thank you Dr. Arely Muniz MD very much for your consultation, it's always appreciated. I had the privilege today to see your patient Jericho Vega. Chief Complaint / History of Present Illness:  Jericho Vega is a very pleasant 61 y.o. male who presents with pain in the chest.. and RUQ and is acute, worsening and dull compared to patient's normal condition. It is moderate in intensity for a duration of 2 days with nausea and vomiting, and fatty food intolerance, and is intermittent with modifying factors increased by or worse with eating. .  CT revealed cholelithiasis. PMH:   has a past medical history of Colitis, Diabetes mellitus ([facility] [address] DJD (degenerative joint disease) of lumbar spine (12/21/2006), External hemorrhoids, Family history of lung cancer (1/16/2017), Gallstones (12/15/2009), GERD (gastroesophageal reflux disease), H/O cardiovascular stress test (3/17/14), Hepatitis C, Hilar adenopathy (1/16/2017), Hypertension, Lipoma (3-), Multiple lung nodules (1/16/2017), Polyp, sigmoid colon (1/5/2010), Pulmonary nodules (12/16/2009), Sleep apnea, obstructive (2/2000), and Vertigo. PSH:   has a past surgical history that includes laparoscopic appendectomy (7/12/2013) and Colonoscopy (3-). Allergies: Allergies   Allergen Reactions    Lansoprazole      diarrhea  cramping        Home Meds:    Prior to Admission medications    Medication Sig Start Date End Date Taking? Authorizing Provider   lisinopril (PRINIVIL;ZESTRIL) 10 MG tablet TAKE 1 TABLET BY MOUTH DAILY 8/12/20   Galen Brooke,    traMADol (ULTRAM) 50 MG tablet Take 1 tablet by mouth 2 times daily as needed for Pain for up to 15 days. Intended supply: 3 days.  Take lowest dose possible to manage pain 7/31/20 8/15/20  Galen Brooke DO   diclofenac sodium (VOLTAREN) 1 % GEL Apply 2 g topically 4 times daily 7/27/20   Galenvanessa Roach, DO   omeprazole (PRILOSEC) 40 MG delayed release capsule TAKE ONE CAPSULE BY MOUTH DAILY 4/8/20   Galen Roach, DO   Insulin Pen Needle (PEN NEEDLES 31GX5/16\") 31G X 8 MM MISC 1 each by Does not apply route daily 3/9/20   Galen Holdenley, DO   insulin glargine (BASAGLAR KWIKPEN) 100 UNIT/ML injection pen Inject 15 Units into the skin nightly    Historical Provider, MD ARCHERUMET  MG per tablet TAKE 1 TABLET BY MOUTH 2 TIMES DAILY (WITH MEALS) 2/14/20   Galen Roach DO   methocarbamol (ROBAXIN) 750 MG tablet TAKE 1 TABLET BY MOUTH TWICE A DAY AS NEEDED FOR BACK PAIN 2/4/19   Yue Kemp MD   meclizine (ANTIVERT) 25 MG tablet TAKE 1 TABLET BY MOUTH THREE TIMES A DAY AS NEEDED FOR DIZZINESS OR NAUSEA 11/1/18   Yue Kemp MD   sildenafil (VIAGRA) 100 MG tablet Take 1 tablet by mouth as needed (urinary obstruction) 7/6/17   Rachael Castro MD   dicyclomine (BENTYL) 10 MG capsule Take 1 capsule by mouth 3 times daily as needed 5/23/16   Yue Kemp MD   meclizine (ANTIVERT) 25 MG tablet Take 1 tablet by mouth 3 times daily as needed for Dizziness or Nausea. 8/19/14   Yue Kemp MD   Cholecalciferol (VITAMIN D3) 2000 UNITS CAPS Take  by mouth daily. Historical Provider, MD   loratadine (CLARITIN) 10 MG tablet Take 10 mg by mouth as needed.     Historical Provider, MD        [facility] Meds:    Current Facility-Administered Medications   Medication Dose Route Frequency Provider Last Rate Last Dose    ondansetron (ZOFRAN) injection 4 mg  4 mg Intravenous Q30 Min PRN Ashley Sifuentes DO        dicyclomine (BENTYL) injection 20 mg  20 mg Intramuscular Once Ashley Sifuentes DO        morphine sulfate (PF) injection 4 mg  4 mg Intravenous Q30 Min PRN Ashley Sifuentes DO   4 mg at 08/14/20 1636    dicyclomine (BENTYL) capsule 10 mg  10 mg Oral TID PRN Kiera Prado MD        insulin glargine (LANTUS) injection vial 15 Units  15 Units Subcutaneous Nightly Veda Gay MD   15 Units at 08/15/20 0358    lisinopril (PRINIVIL;ZESTRIL) tablet 10 mg  10 mg Oral Daily Veda Gay MD   10 mg at 08/15/20 5591    meclizine (ANTIVERT) tablet 25 mg  25 mg Oral TID PRN Veda Gay MD        methocarbamol (ROBAXIN) tablet 750 mg  750 mg Oral TID Veda Gay MD   750 mg at 08/15/20 0839    pantoprazole (PROTONIX) tablet 40 mg  40 mg Oral QAM AC Veda Gay MD   40 mg at 08/15/20 0839    traMADol (ULTRAM) tablet 50 mg  50 mg Oral Q6H PRN Veda Gay MD   50 mg at 08/15/20 0358    sodium chloride flush 0.9 % injection 10 mL  10 mL Intravenous 2 times per day Veda Gay MD   10 mL at 08/14/20 2156    sodium chloride flush 0.9 % injection 10 mL  10 mL Intravenous PRN Veda Gay MD        acetaminophen (TYLENOL) tablet 650 mg  650 mg Oral Q6H PRN Veda Gay MD        Or    acetaminophen (TYLENOL) suppository 650 mg  650 mg Rectal Q6H PRN Veda Gay MD        polyethylene glycol [facility]) packet 17 g  17 g Oral Daily PRN Veda aGy MD        promethazine (PHENERGAN) tablet 12.5 mg  12.5 mg Oral Q6H PRN Veda Gay MD        Or    ondansetron [facility]) injection 4 mg  4 mg Intravenous Q6H PRN Veda Gay MD        atorvastatin (LIPITOR) tablet 40 mg  40 mg Oral Nightly Veda Gay MD   40 mg at 08/14/20 2155    aspirin chewable tablet 81 mg  81 mg Oral Daily Veda Gay MD   81 mg at 08/15/20 0839    enoxaparin (LOVENOX) injection 40 mg  40 mg Subcutaneous Daily Veda Gay MD   40 mg at 08/15/20 0843    nitroGLYCERIN (NITROSTAT) SL tablet 0.4 mg  0.4 mg Sublingual Q5 Min PRN Veda Gay MD        insulin lispro (HUMALOG) injection vial 0-6 Units  0-6 Units Subcutaneous TID  Veda Gay MD   1 Units at 08/15/20 0840    insulin lispro (HUMALOG) injection vial 0-3 Units  0-3 Units Subcutaneous Nightly Veda Gay MD   2 Units at 08/14/20 8323    0.9 % sodium chloride infusion   Intravenous Continuous Ivette Escalante  mL/hr at 08/15/20 3457        sodium chloride 100 mL/hr at 08/15/20 5724       Social History / Family History:     Social History     Socioeconomic History    Marital status:      Spouse name: None    Number of children: None    Years of education: None    Highest education level: None   Occupational History    None   Social Needs    Financial resource strain: None    Food insecurity     Worry: None     Inability: None    Transportation needs     Medical: None     Non-medical: None   Tobacco Use    Smoking status: Never Smoker    Smokeless tobacco: Never Used   Substance and Sexual Activity    Alcohol use: Yes     Comment: occ    Drug use: No    Sexual activity: None   Lifestyle    Physical activity     Days per week: None     Minutes per session: None    Stress: None   Relationships    Social connections     Talks on phone: None     Gets together: None     Attends Alevism service: None     Active member of club or organization: None     Attends meetings of clubs or organizations: None     Relationship status: None    Intimate partner violence     Fear of current or ex partner: None     Emotionally abused: None     Physically abused: None     Forced sexual activity: None   Other Topics Concern    None   Social History Narrative    None    History reviewed. No pertinent family history. otherwise irrelevant to this surgical issue. Review of Systems:  Constitutional: Negative for fever, chills, diaphoresis, appetite change and fatigue. HENT: Negative for sore throat, trouble swallowing and voice change. Respiratory: Positive for Chest pain, Negative for cough, positive for shortness of breath no wheezing. Cardiovascular: Negative for chest pain positive for SOB with one flight of stairs exertion, no pitting LE edema.    Gastrointestinal: Postive for RUQ pain, and for nausea, vomiting, no abdominal distention, constipation, no diarrhea, blood in stool, anal bleeding or rectal pain. Musculoskeletal: Negiative for joint swelling and arthralgias. Skin: Warm and dry, well perfused. Neurological: Negative for seizures, syncope, speech difficulty and weakness. Hematological/Lymphatic: Negative for adenopathy. No history of DVT/PE. Does not bruise/bleed easily. Psychiatric/Behavioral: Negative for agitation. All others reviewed and negative. Physical Exam:  Vital Signs:   Vitals:    08/15/20 0835   BP: 109/80   Pulse: 84   Resp: 18   Temp: 98.2 °F (36.8 °C)   SpO2: 98%     Body mass index is 30.43 kg/m². General appearance: Pt Alert and oriented, in no apparent acute distress. HEENT:  DONAVAN, Conjunctiva clear. EOMI, No JVDs, Bruits, Megalies: neither thyroid nor lymph nodes. Lungs: Clear to auscultation bilaterally. Heart: Regular rate and rhythm, S1, S2 normal, no murmur, rub or gallop. Abdomen: RUQ tender. Positive Bajwa sign. Non distended. Positive bowel sounds. No hernias noted, no masses palpable. Extremities: Warm, well perfused, no cyanosis or edema. Skin: Skin color, texture, turgor normal.  Neurologic: Grossly normal, Cranial nerves from II to XII intact, Deep tendon reflexes normal.  Lymph nodes: No palpable LNs, Cervical, groins, abdominal.  Musculoskeletal: Bilateral Upper and lower extremities ROM within normal limits. Radiologic / Imaging / TESTING  CT yesterday:  Impression    1. Normal caliber aorta without acute aortic pathology.     2. Multiple pulmonary nodules bilaterally some of which are new since 2015 or    2010 or have increased in size.  Some of these are partially cavitary in    appearance.  However, there has been interval development of calcification    involving majority of these nodules favoring a benign process and healed    infectious or inflammatory process.  Recommend correlation with any history    of malignancy.  Otherwise these can be followed per clinical protocol or in    3-6 months. 3. Dependent atelectasis with no other acute cardiopulmonary findings. 4. No acute findings in the abdomen or pelvis. 5. Cholelithiasis.          Labs:    Recent Results (from the past 24 hour(s))   POCT Creatinine    Collection Time: 08/14/20  1:37 PM   Result Value Ref Range    POC Creatinine 0.6 (L) 0.9 - 1.3 MG/DL    GFR Non-African American >60 >60 mL/min/1.73m2    GFR African American >60 >60 mL/min/1.73m2   EKG 12 Lead    Collection Time: 08/14/20  4:13 PM   Result Value Ref Range    Ventricular Rate 89 BPM    Atrial Rate 89 BPM    P-R Interval 166 ms    QRS Duration 98 ms    Q-T Interval 356 ms    QTc Calculation (Bazett) 433 ms    P Axis 45 degrees    R Axis 61 degrees    T Axis 52 degrees    Diagnosis       Sinus rhythm with occasional premature ventricular complexes  Otherwise normal ECG  No previous ECGs available     POCT Glucose    Collection Time: 08/14/20  4:18 PM   Result Value Ref Range    POC Glucose 197 (H) 70 - 99 MG/DL   CBC Auto Differential    Collection Time: 08/14/20  4:26 PM   Result Value Ref Range    WBC 12.5 (H) 4.0 - 10.5 K/CU MM    RBC 5.39 4.6 - 6.2 M/CU MM    Hemoglobin 17.5 13.5 - 18.0 GM/DL    Hematocrit 51.6 42 - 52 %    MCV 95.7 78 - 100 FL    MCH 32.5 (H) 27 - 31 PG    MCHC 33.9 32.0 - 36.0 %    RDW 11.9 11.7 - 14.9 %    Platelets 900 (L) 303 - 440 K/CU MM    MPV 10.1 7.5 - 11.1 FL    Differential Type AUTOMATED DIFFERENTIAL     Segs Relative 84.5 (H) 36 - 66 %    Lymphocytes % 12.0 (L) 24 - 44 %    Monocytes % 2.5 0 - 4 %    Eosinophils % 0.2 0 - 3 %    Basophils % 0.2 0 - 1 %    Segs Absolute 10.6 K/CU MM    Lymphocytes Absolute 1.5 K/CU MM    Monocytes Absolute 0.3 K/CU MM    Eosinophils Absolute 0.0 K/CU MM    Basophils Absolute 0.0 K/CU MM    Nucleated RBC % 0.0 %    Total Nucleated RBC 0.0 K/CU MM    Total Immature Neutrophil 0.07 K/CU MM    Immature Neutrophil % 0.6 (H) 0 - 0.43 %   CMP    Collection Time: 08/14/20  4:26 PM Result Value Ref Range    Sodium 137 135 - 145 MMOL/L    Potassium 3.6 3.5 - 5.1 MMOL/L    Chloride 103 99 - 110 mMol/L    CO2 18 (L) 21 - 32 MMOL/L    BUN 12 6 - 23 MG/DL    CREATININE 0.5 (L) 0.9 - 1.3 MG/DL    Glucose 225 (H) 70 - 99 MG/DL    Calcium 9.4 8.3 - 10.6 MG/DL    Alb 4.7 3.4 - 5.0 GM/DL    Total Protein 7.2 6.4 - 8.2 GM/DL    Total Bilirubin 0.5 0.0 - 1.0 MG/DL    ALT 22 10 - 40 U/L    AST 17 15 - 37 IU/L    Alkaline Phosphatase 116 40 - 129 IU/L    GFR Non-African American >60 >60 mL/min/1.73m2    GFR African American >60 >60 mL/min/1.73m2    Anion Gap 16 4 - 16   Lipase    Collection Time: 08/14/20  4:26 PM   Result Value Ref Range    Lipase 124 (H) 13 - 60 IU/L   Troponin    Collection Time: 08/14/20  4:26 PM   Result Value Ref Range    Troponin T <0.010 <0.01 NG/ML   CK    Collection Time: 08/14/20  4:26 PM   Result Value Ref Range    Total  38 - 174 IU/L   Brain Natriuretic Peptide    Collection Time: 08/14/20  4:26 PM   Result Value Ref Range    Pro-BNP 23.99 <300 PG/ML   Magnesium    Collection Time: 08/14/20  4:26 PM   Result Value Ref Range    Magnesium 1.7 (L) 1.8 - 2.4 mg/dl   Lactic Acid, Plasma    Collection Time: 08/14/20  4:26 PM   Result Value Ref Range    Lactate 1.5 0.4 - 2.0 mMOL/L   EKG 12 Lead    Collection Time: 08/14/20  4:38 PM   Result Value Ref Range    Ventricular Rate 92 BPM    Atrial Rate 92 BPM    P-R Interval 168 ms    QRS Duration 98 ms    Q-T Interval 350 ms    QTc Calculation (Bazett) 432 ms    P Axis 40 degrees    R Axis 59 degrees    T Axis 36 degrees    Diagnosis       Normal sinus rhythm  Normal ECG  No previous ECGs available     EKG 12 Lead    Collection Time: 08/14/20  5:28 PM   Result Value Ref Range    Ventricular Rate 91 BPM    Atrial Rate 91 BPM    P-R Interval 168 ms    QRS Duration 100 ms    Q-T Interval 350 ms    QTc Calculation (Bazett) 430 ms    P Axis 41 degrees    R Axis 63 degrees    T Axis 63 degrees    Diagnosis       Sinus rhythm with premature supraventricular complexes and with occasional premature ventricular complexes  Nonspecific ST abnormality  Abnormal ECG  When compared with ECG of 14-AUG-2020 16:38,  premature ventricular complexes are now present  premature supraventricular complexes are now present     POCT Glucose    Collection Time: 08/14/20  9:58 PM   Result Value Ref Range    POC Glucose 244 (H) 70 - 99 MG/DL   CBC    Collection Time: 08/15/20  1:44 AM   Result Value Ref Range    WBC 18.7 (H) 4.0 - 10.5 K/CU MM    RBC 4.78 4.6 - 6.2 M/CU MM    Hemoglobin 15.4 13.5 - 18.0 GM/DL    Hematocrit 46.1 42 - 52 %    MCV 96.4 78 - 100 FL    MCH 32.2 (H) 27 - 31 PG    MCHC 33.4 32.0 - 36.0 %    RDW 12.0 11.7 - 14.9 %    Platelets 427 (L) 601 - 440 K/CU MM    MPV 10.2 7.5 - 11.1 FL   Troponin    Collection Time: 08/15/20  1:44 AM   Result Value Ref Range    Troponin T <0.010 <0.01 NG/ML   POCT Glucose    Collection Time: 08/15/20  3:57 AM   Result Value Ref Range    POC Glucose 198 (H) 70 - 99 MG/DL   Troponin    Collection Time: 08/15/20  7:37 AM   Result Value Ref Range    Troponin T <0.010 <0.01 NG/ML       Diagnosis:  Patient Active Problem List   Diagnosis    Essential hypertension    Hepatitis C    Colitis    Mixed hyperlipidemia    Vitamin D deficiency    Type 2 diabetes mellitus without complication (HCC)    Multiple lung nodules    Hilar adenopathy    Family history of lung cancer    Peyronie disease    Gastroesophageal reflux disease without esophagitis    Somatic dysfunction of rib cage region    Somatic dysfunction of back    Rib pain on right side    Chest pain       Assessment & plan:  Chrissy Lee is a very pleasant 61 y.o. male presenting with acute abdominal for 2 years but increased the last 2 days, Acute on top of Chronic calculous cholecystitis, cardiac eval noted and appreciated, and I will proceed with lap cholecystectomy, in am.    Continue NPO after midnight, IV Fluids, Pain control, Nausea control, IV Antibiotics. Thank you doctor Radha Bennett MD very much for your consultation and for the opportunity to take care of Mr Rosalino Flores with you, I'll follow along with you and I'll update you on any new events in his care.    ____________________________________________    Chava Hutchinson MD, FACS, FICS    8/15/2020  10:27 AM      Patient was seen with total face to face time of 45 minutes. More than 50% of this visit was counseling and education as above in my assessment and plan section of my note.

## 2020-08-16 ENCOUNTER — ANESTHESIA (OUTPATIENT)
Dept: OPERATING ROOM | Age: 60
DRG: 419 | End: 2020-08-16
Payer: COMMERCIAL

## 2020-08-16 ENCOUNTER — ANESTHESIA EVENT (OUTPATIENT)
Dept: OPERATING ROOM | Age: 60
DRG: 419 | End: 2020-08-16
Payer: COMMERCIAL

## 2020-08-16 VITALS
WEIGHT: 220.3 LBS | TEMPERATURE: 98 F | SYSTOLIC BLOOD PRESSURE: 151 MMHG | RESPIRATION RATE: 16 BRPM | HEART RATE: 73 BPM | HEIGHT: 72 IN | OXYGEN SATURATION: 97 % | BODY MASS INDEX: 29.84 KG/M2 | DIASTOLIC BLOOD PRESSURE: 78 MMHG

## 2020-08-16 VITALS
OXYGEN SATURATION: 100 % | DIASTOLIC BLOOD PRESSURE: 91 MMHG | TEMPERATURE: 98.6 F | RESPIRATION RATE: 1 BRPM | SYSTOLIC BLOOD PRESSURE: 150 MMHG

## 2020-08-16 LAB — GLUCOSE BLD-MCNC: 156 MG/DL (ref 70–99)

## 2020-08-16 PROCEDURE — 3600000014 HC SURGERY LEVEL 4 ADDTL 15MIN: Performed by: SURGERY

## 2020-08-16 PROCEDURE — 3600000004 HC SURGERY LEVEL 4 BASE: Performed by: SURGERY

## 2020-08-16 PROCEDURE — 47562 LAPAROSCOPIC CHOLECYSTECTOMY: CPT | Performed by: SURGERY

## 2020-08-16 PROCEDURE — 6370000000 HC RX 637 (ALT 250 FOR IP): Performed by: INTERNAL MEDICINE

## 2020-08-16 PROCEDURE — 2580000003 HC RX 258: Performed by: SURGERY

## 2020-08-16 PROCEDURE — 6360000002 HC RX W HCPCS: Performed by: ANESTHESIOLOGY

## 2020-08-16 PROCEDURE — 6360000002 HC RX W HCPCS: Performed by: NURSE ANESTHETIST, CERTIFIED REGISTERED

## 2020-08-16 PROCEDURE — 1200000000 HC SEMI PRIVATE

## 2020-08-16 PROCEDURE — 6360000002 HC RX W HCPCS: Performed by: SURGERY

## 2020-08-16 PROCEDURE — 3700000001 HC ADD 15 MINUTES (ANESTHESIA): Performed by: SURGERY

## 2020-08-16 PROCEDURE — 6370000000 HC RX 637 (ALT 250 FOR IP): Performed by: SURGERY

## 2020-08-16 PROCEDURE — 2709999900 HC NON-CHARGEABLE SUPPLY: Performed by: SURGERY

## 2020-08-16 PROCEDURE — 2780000010 HC IMPLANT OTHER: Performed by: SURGERY

## 2020-08-16 PROCEDURE — 7100000001 HC PACU RECOVERY - ADDTL 15 MIN: Performed by: SURGERY

## 2020-08-16 PROCEDURE — 2720000010 HC SURG SUPPLY STERILE: Performed by: SURGERY

## 2020-08-16 PROCEDURE — 3700000000 HC ANESTHESIA ATTENDED CARE: Performed by: SURGERY

## 2020-08-16 PROCEDURE — 7100000000 HC PACU RECOVERY - FIRST 15 MIN: Performed by: SURGERY

## 2020-08-16 PROCEDURE — 96366 THER/PROPH/DIAG IV INF ADDON: CPT

## 2020-08-16 PROCEDURE — 0FT44ZZ RESECTION OF GALLBLADDER, PERCUTANEOUS ENDOSCOPIC APPROACH: ICD-10-PCS | Performed by: SURGERY

## 2020-08-16 PROCEDURE — 2500000003 HC RX 250 WO HCPCS: Performed by: NURSE ANESTHETIST, CERTIFIED REGISTERED

## 2020-08-16 PROCEDURE — 99231 SBSQ HOSP IP/OBS SF/LOW 25: CPT | Performed by: INTERNAL MEDICINE

## 2020-08-16 PROCEDURE — 2500000003 HC RX 250 WO HCPCS: Performed by: SURGERY

## 2020-08-16 PROCEDURE — 88304 TISSUE EXAM BY PATHOLOGIST: CPT

## 2020-08-16 PROCEDURE — G0378 HOSPITAL OBSERVATION PER HR: HCPCS

## 2020-08-16 PROCEDURE — 82962 GLUCOSE BLOOD TEST: CPT

## 2020-08-16 RX ORDER — FENTANYL CITRATE 50 UG/ML
25 INJECTION, SOLUTION INTRAMUSCULAR; INTRAVENOUS EVERY 5 MIN PRN
Status: DISCONTINUED | OUTPATIENT
Start: 2020-08-16 | End: 2020-08-16 | Stop reason: HOSPADM

## 2020-08-16 RX ORDER — KETAMINE HYDROCHLORIDE 10 MG/ML
INJECTION, SOLUTION INTRAMUSCULAR; INTRAVENOUS PRN
Status: DISCONTINUED | OUTPATIENT
Start: 2020-08-16 | End: 2020-08-16 | Stop reason: SDUPTHER

## 2020-08-16 RX ORDER — DEXAMETHASONE SODIUM PHOSPHATE 4 MG/ML
INJECTION, SOLUTION INTRA-ARTICULAR; INTRALESIONAL; INTRAMUSCULAR; INTRAVENOUS; SOFT TISSUE PRN
Status: DISCONTINUED | OUTPATIENT
Start: 2020-08-16 | End: 2020-08-16 | Stop reason: SDUPTHER

## 2020-08-16 RX ORDER — FENTANYL CITRATE 50 UG/ML
INJECTION, SOLUTION INTRAMUSCULAR; INTRAVENOUS PRN
Status: DISCONTINUED | OUTPATIENT
Start: 2020-08-16 | End: 2020-08-16 | Stop reason: SDUPTHER

## 2020-08-16 RX ORDER — ONDANSETRON 2 MG/ML
4 INJECTION INTRAMUSCULAR; INTRAVENOUS
Status: DISCONTINUED | OUTPATIENT
Start: 2020-08-16 | End: 2020-08-16 | Stop reason: HOSPADM

## 2020-08-16 RX ORDER — ROCURONIUM BROMIDE 10 MG/ML
INJECTION, SOLUTION INTRAVENOUS PRN
Status: DISCONTINUED | OUTPATIENT
Start: 2020-08-16 | End: 2020-08-16 | Stop reason: SDUPTHER

## 2020-08-16 RX ORDER — AMOXICILLIN 250 MG
2 CAPSULE ORAL 2 TIMES DAILY
Status: DISCONTINUED | OUTPATIENT
Start: 2020-08-16 | End: 2020-08-16 | Stop reason: CLARIF

## 2020-08-16 RX ORDER — SENNA AND DOCUSATE SODIUM 50; 8.6 MG/1; MG/1
2 TABLET, FILM COATED ORAL 2 TIMES DAILY
Qty: 60 TABLET | Refills: 0 | Status: SHIPPED | OUTPATIENT
Start: 2020-08-16 | End: 2020-08-31

## 2020-08-16 RX ORDER — AMOXICILLIN AND CLAVULANATE POTASSIUM 875; 125 MG/1; MG/1
1 TABLET, FILM COATED ORAL 2 TIMES DAILY
Qty: 10 TABLET | Refills: 0 | Status: SHIPPED | OUTPATIENT
Start: 2020-08-16 | End: 2020-08-21

## 2020-08-16 RX ORDER — HYDROMORPHONE HCL 110MG/55ML
PATIENT CONTROLLED ANALGESIA SYRINGE INTRAVENOUS PRN
Status: DISCONTINUED | OUTPATIENT
Start: 2020-08-16 | End: 2020-08-16 | Stop reason: SDUPTHER

## 2020-08-16 RX ORDER — OXYCODONE HYDROCHLORIDE AND ACETAMINOPHEN 5; 325 MG/1; MG/1
2 TABLET ORAL EVERY 4 HOURS PRN
Status: DISCONTINUED | OUTPATIENT
Start: 2020-08-16 | End: 2020-08-16 | Stop reason: HOSPADM

## 2020-08-16 RX ORDER — HYDROMORPHONE HCL 110MG/55ML
0.5 PATIENT CONTROLLED ANALGESIA SYRINGE INTRAVENOUS EVERY 5 MIN PRN
Status: DISCONTINUED | OUTPATIENT
Start: 2020-08-16 | End: 2020-08-16 | Stop reason: HOSPADM

## 2020-08-16 RX ORDER — SENNA AND DOCUSATE SODIUM 50; 8.6 MG/1; MG/1
2 TABLET, FILM COATED ORAL 2 TIMES DAILY
Status: DISCONTINUED | OUTPATIENT
Start: 2020-08-16 | End: 2020-08-16 | Stop reason: HOSPADM

## 2020-08-16 RX ORDER — ONDANSETRON 2 MG/ML
INJECTION INTRAMUSCULAR; INTRAVENOUS PRN
Status: DISCONTINUED | OUTPATIENT
Start: 2020-08-16 | End: 2020-08-16 | Stop reason: SDUPTHER

## 2020-08-16 RX ORDER — CARVEDILOL 3.12 MG/1
3.12 TABLET ORAL 2 TIMES DAILY WITH MEALS
Qty: 60 TABLET | Refills: 3 | Status: SHIPPED | OUTPATIENT
Start: 2020-08-16 | End: 2020-12-30

## 2020-08-16 RX ORDER — LABETALOL HYDROCHLORIDE 5 MG/ML
5 INJECTION, SOLUTION INTRAVENOUS EVERY 10 MIN PRN
Status: DISCONTINUED | OUTPATIENT
Start: 2020-08-16 | End: 2020-08-16 | Stop reason: HOSPADM

## 2020-08-16 RX ORDER — LIDOCAINE HYDROCHLORIDE 20 MG/ML
INJECTION, SOLUTION INTRAVENOUS PRN
Status: DISCONTINUED | OUTPATIENT
Start: 2020-08-16 | End: 2020-08-16 | Stop reason: SDUPTHER

## 2020-08-16 RX ORDER — OXYCODONE HYDROCHLORIDE AND ACETAMINOPHEN 5; 325 MG/1; MG/1
1 TABLET ORAL EVERY 4 HOURS PRN
Qty: 30 TABLET | Refills: 0 | Status: SHIPPED | OUTPATIENT
Start: 2020-08-16 | End: 2020-08-21 | Stop reason: CLARIF

## 2020-08-16 RX ORDER — PROPOFOL 10 MG/ML
INJECTION, EMULSION INTRAVENOUS PRN
Status: DISCONTINUED | OUTPATIENT
Start: 2020-08-16 | End: 2020-08-16 | Stop reason: SDUPTHER

## 2020-08-16 RX ORDER — GLYCOPYRROLATE 1 MG/5 ML
SYRINGE (ML) INTRAVENOUS PRN
Status: DISCONTINUED | OUTPATIENT
Start: 2020-08-16 | End: 2020-08-16 | Stop reason: SDUPTHER

## 2020-08-16 RX ORDER — ASPIRIN 81 MG/1
81 TABLET, CHEWABLE ORAL DAILY
Qty: 30 TABLET | Refills: 3 | Status: SHIPPED | OUTPATIENT
Start: 2020-08-17 | End: 2020-12-30 | Stop reason: SDUPTHER

## 2020-08-16 RX ORDER — ATORVASTATIN CALCIUM 40 MG/1
40 TABLET, FILM COATED ORAL NIGHTLY
Qty: 30 TABLET | Refills: 3 | Status: SHIPPED | OUTPATIENT
Start: 2020-08-16 | End: 2020-12-30 | Stop reason: SDUPTHER

## 2020-08-16 RX ORDER — HYDRALAZINE HYDROCHLORIDE 20 MG/ML
5 INJECTION INTRAMUSCULAR; INTRAVENOUS EVERY 10 MIN PRN
Status: DISCONTINUED | OUTPATIENT
Start: 2020-08-16 | End: 2020-08-16 | Stop reason: HOSPADM

## 2020-08-16 RX ADMIN — SODIUM CHLORIDE: 9 INJECTION, SOLUTION INTRAVENOUS at 10:42

## 2020-08-16 RX ADMIN — CEFAZOLIN 2 G: 10 INJECTION, POWDER, FOR SOLUTION INTRAVENOUS at 06:59

## 2020-08-16 RX ADMIN — ROCURONIUM BROMIDE 50 MG: 10 INJECTION INTRAVENOUS at 09:16

## 2020-08-16 RX ADMIN — LIDOCAINE HYDROCHLORIDE 100 MG: 20 INJECTION, SOLUTION INTRAVENOUS at 09:16

## 2020-08-16 RX ADMIN — SENNOSIDES AND DOCUSATE SODIUM 2 TABLET: 8.6; 5 TABLET ORAL at 16:45

## 2020-08-16 RX ADMIN — CARVEDILOL 3.12 MG: 3.12 TABLET, FILM COATED ORAL at 06:59

## 2020-08-16 RX ADMIN — CARVEDILOL 3.12 MG: 3.12 TABLET, FILM COATED ORAL at 16:45

## 2020-08-16 RX ADMIN — PANTOPRAZOLE SODIUM 40 MG: 40 TABLET, DELAYED RELEASE ORAL at 06:59

## 2020-08-16 RX ADMIN — CEFAZOLIN 2 G: 10 INJECTION, POWDER, FOR SOLUTION INTRAVENOUS at 13:28

## 2020-08-16 RX ADMIN — FENTANYL CITRATE 100 MCG: 50 INJECTION INTRAMUSCULAR; INTRAVENOUS at 09:16

## 2020-08-16 RX ADMIN — SUGAMMADEX 200 MG: 100 INJECTION, SOLUTION INTRAVENOUS at 10:12

## 2020-08-16 RX ADMIN — Medication 0.4 MG: at 09:40

## 2020-08-16 RX ADMIN — HYDROMORPHONE HYDROCHLORIDE 0.6 MG: 2 INJECTION INTRAMUSCULAR; INTRAVENOUS; SUBCUTANEOUS at 10:02

## 2020-08-16 RX ADMIN — PROPOFOL 20 MG: 10 INJECTION, EMULSION INTRAVENOUS at 09:16

## 2020-08-16 RX ADMIN — DEXAMETHASONE SODIUM PHOSPHATE 8 MG: 4 INJECTION, SOLUTION INTRAMUSCULAR; INTRAVENOUS at 09:23

## 2020-08-16 RX ADMIN — HYDROMORPHONE HYDROCHLORIDE 0.5 MG: 2 INJECTION INTRAMUSCULAR; INTRAVENOUS; SUBCUTANEOUS at 10:36

## 2020-08-16 RX ADMIN — KETAMINE HYDROCHLORIDE 50 MG: 10 INJECTION INTRAMUSCULAR; INTRAVENOUS at 09:16

## 2020-08-16 RX ADMIN — LIDOCAINE HYDROCHLORIDE 20 MG: 20 INJECTION, SOLUTION INTRAVENOUS at 10:16

## 2020-08-16 RX ADMIN — ONDANSETRON 4 MG: 2 INJECTION INTRAMUSCULAR; INTRAVENOUS at 09:16

## 2020-08-16 RX ADMIN — METHOCARBAMOL TABLETS 750 MG: 500 TABLET, COATED ORAL at 13:28

## 2020-08-16 ASSESSMENT — PAIN DESCRIPTION - LOCATION
LOCATION: ABDOMEN
LOCATION: ABDOMEN

## 2020-08-16 ASSESSMENT — PULMONARY FUNCTION TESTS
PIF_VALUE: 16
PIF_VALUE: 1
PIF_VALUE: 17
PIF_VALUE: 22
PIF_VALUE: 23
PIF_VALUE: 0
PIF_VALUE: 24
PIF_VALUE: 3
PIF_VALUE: 14
PIF_VALUE: 17
PIF_VALUE: 23
PIF_VALUE: 1
PIF_VALUE: 16
PIF_VALUE: 4
PIF_VALUE: 18
PIF_VALUE: 11
PIF_VALUE: 11
PIF_VALUE: 1
PIF_VALUE: 23
PIF_VALUE: 4
PIF_VALUE: 1
PIF_VALUE: 0
PIF_VALUE: 10
PIF_VALUE: 23
PIF_VALUE: 11
PIF_VALUE: 1
PIF_VALUE: 17
PIF_VALUE: 12
PIF_VALUE: 4
PIF_VALUE: 9
PIF_VALUE: 23
PIF_VALUE: 17
PIF_VALUE: 15
PIF_VALUE: 16
PIF_VALUE: 19
PIF_VALUE: 13
PIF_VALUE: 1
PIF_VALUE: 24
PIF_VALUE: 13
PIF_VALUE: 4
PIF_VALUE: 1
PIF_VALUE: 10
PIF_VALUE: 25
PIF_VALUE: 22
PIF_VALUE: 6
PIF_VALUE: 17
PIF_VALUE: 2
PIF_VALUE: 10
PIF_VALUE: 0
PIF_VALUE: 2
PIF_VALUE: 17
PIF_VALUE: 22
PIF_VALUE: 17
PIF_VALUE: 17
PIF_VALUE: 16
PIF_VALUE: 23
PIF_VALUE: 0
PIF_VALUE: 18
PIF_VALUE: 23
PIF_VALUE: 24
PIF_VALUE: 0
PIF_VALUE: 23
PIF_VALUE: 16
PIF_VALUE: 24
PIF_VALUE: 23
PIF_VALUE: 17
PIF_VALUE: 25

## 2020-08-16 ASSESSMENT — PAIN DESCRIPTION - PAIN TYPE
TYPE: SURGICAL PAIN
TYPE: SURGICAL PAIN

## 2020-08-16 ASSESSMENT — PAIN SCALES - GENERAL
PAINLEVEL_OUTOF10: 3
PAINLEVEL_OUTOF10: 0
PAINLEVEL_OUTOF10: 7

## 2020-08-16 ASSESSMENT — PAIN DESCRIPTION - PROGRESSION: CLINICAL_PROGRESSION: GRADUALLY IMPROVING

## 2020-08-16 ASSESSMENT — PAIN DESCRIPTION - DESCRIPTORS
DESCRIPTORS: ACHING
DESCRIPTORS: ACHING

## 2020-08-16 ASSESSMENT — PAIN DESCRIPTION - ORIENTATION
ORIENTATION: RIGHT;UPPER
ORIENTATION: RIGHT;UPPER

## 2020-08-16 ASSESSMENT — PAIN DESCRIPTION - FREQUENCY
FREQUENCY: CONTINUOUS
FREQUENCY: CONTINUOUS

## 2020-08-16 NOTE — PROGRESS NOTES
1018- pt rec'd from the OR and placed on pacu monitor with alarms on. Report rec'd from Ruchi CARVALHO and OR nurse. Pt arousing and following commands. Pt re-oriented to surroundings. resps even and unlabored. ABD soft and non-distended. Pt denies pain or nausea. 1030- cough and deep breathing exercises performed. 1057- pt voiced a decrease in ABD pain. Pt transferred back to room 3023 via bed without incident. Pt's wife updated.

## 2020-08-16 NOTE — ANESTHESIA PRE PROCEDURE
Department of Anesthesiology  Preprocedure Note       Name:  Jamila Worley   Age:  61 y.o.  :  1960                                          MRN:  [id number]         Date:  2020      Surgeon: Elsy Degree):  Vasquez Samayoa MD    Procedure: Procedure(s):  CHOLECYSTECTOMY LAPAROSCOPIC    Medications prior to admission:   Prior to Admission medications    Medication Sig Start Date End Date Taking? Authorizing Provider   lisinopril (PRINIVIL;ZESTRIL) 10 MG tablet TAKE 1 TABLET BY MOUTH DAILY 20   Galenvanessa Kowalski, DO   diclofenac sodium (VOLTAREN) 1 % GEL Apply 2 g topically 4 times daily 20   Galen Kowalski, DO   omeprazole (PRILOSEC) 40 MG delayed release capsule TAKE ONE CAPSULE BY MOUTH DAILY 20   Galenvanessa Kowalski, DO   Insulin Pen Needle (PEN NEEDLES 31GX5/16\") 31G X 8 MM MISC 1 each by Does not apply route daily 3/9/20   Galen Yana Kowalski, DO   insulin glargine (BASAGLAR KWIKPEN) 100 UNIT/ML injection pen Inject 15 Units into the skin nightly    Historical Provider, MD   JANUMECATALINA  MG per tablet TAKE 1 TABLET BY MOUTH 2 TIMES DAILY (WITH MEALS) 20   Galen Kowalski DO   methocarbamol (ROBAXIN) 750 MG tablet TAKE 1 TABLET BY MOUTH TWICE A DAY AS NEEDED FOR BACK PAIN 19   Dusty Samples, MD   meclizine (ANTIVERT) 25 MG tablet TAKE 1 TABLET BY MOUTH THREE TIMES A DAY AS NEEDED FOR DIZZINESS OR NAUSEA 18   Dusty Samples, MD   sildenafil (VIAGRA) 100 MG tablet Take 1 tablet by mouth as needed (urinary obstruction) 17   Claretha Dubin, MD   dicyclomine (BENTYL) 10 MG capsule Take 1 capsule by mouth 3 times daily as needed 16   Dusty Samples, MD   meclizine (ANTIVERT) 25 MG tablet Take 1 tablet by mouth 3 times daily as needed for Dizziness or Nausea. 14   Dusty Samples, MD   Cholecalciferol (VITAMIN D3) 2000 UNITS CAPS Take  by mouth daily. Historical Provider, MD   loratadine (CLARITIN) 10 MG tablet Take 10 mg by mouth as needed. Jameel Claros MD        Or    acetaminophen (TYLENOL) suppository 650 mg  650 mg Rectal Q6H PRN Holley Emmanuel MD        polyethylene glycol [facility]) packet 17 g  17 g Oral Daily PRN Holley Emmanuel MD        promethazine (PHENERGAN) tablet 12.5 mg  12.5 mg Oral Q6H PRN Holley Emmanuel MD        Or    [facility] injection 4 mg  4 mg Intravenous Q6H PRN Holley Emmanuel MD        atorvastatin (LIPITOR) tablet 40 mg  40 mg Oral Nightly Holley Emmanuel MD   40 mg at 08/15/20 2031    aspirin chewable tablet 81 mg  81 mg Oral Daily Holley Emmanuel MD   81 mg at 08/15/20 0839    enoxaparin (LOVENOX) injection 40 mg  40 mg Subcutaneous Daily Holley Emmanuel MD   40 mg at 08/15/20 0843    nitroGLYCERIN (NITROSTAT) SL tablet 0.4 mg  0.4 mg Sublingual Q5 Min PRN Holley Emmanuel MD        insulin lispro (HUMALOG) injection vial 0-6 Units  0-6 Units Subcutaneous TID WC Holley Emmanuel MD   1 Units at 08/15/20 1832    insulin lispro (HUMALOG) injection vial 0-3 Units  0-3 Units Subcutaneous Nightly Holley Emmanuel MD   1 Units at 08/15/20 2040    0.9 % sodium chloride infusion   Intravenous Continuous Holley Emmanuel  mL/hr at 08/15/20 0969         Allergies:     Allergies   Allergen Reactions    Lansoprazole      diarrhea  cramping       Problem List:    Patient Active Problem List   Diagnosis Code    Essential hypertension I10    Hepatitis C B19.20    Colitis K52.9    Mixed hyperlipidemia E78.2    Vitamin D deficiency E55.9    Type 2 diabetes mellitus without complication ([facility] [address] F62.4    Multiple lung nodules R91.8    Hilar adenopathy R59.0    Family history of lung cancer Z80.1    Peyronie disease N48.6    Gastroesophageal reflux disease without esophagitis K21.9    Somatic dysfunction of rib cage region M99.08    Somatic dysfunction of back M99.09    Rib pain on right side R07.81    Chest pain R07.9    Abdominal pain R10.9    Leukocytosis D72.829    Nausea and vomiting R11.2  CCC (chronic calculous cholecystitis) K80.10    Acute calculous cholecystitis K80.00       Past Medical History:        Diagnosis Date    Colitis     Diabetes mellitus ([address]     DJD (degenerative joint disease) of lumbar spine 12/21/2006    MRI Lumbar spine    External hemorrhoids     Family history of lung cancer 1/16/2017    Gallstones 12/15/2009    CT thorax    GERD (gastroesophageal reflux disease)     H/O cardiovascular stress test 3/17/14    3/14-WNL. EF 62%. Exercise capacity 12.8 METS.      Hepatitis C     Hilar adenopathy 1/16/2017    Hypertension     Lipoma 3-    submucosal    Multiple lung nodules 1/16/2017    Polyp, sigmoid colon 1/5/2010    hyperplastic    Pulmonary nodules 12/16/2009    followed by Dr Christine Gonzalez Sleep apnea, obstructive 2/2000    nasal CPAP 8cm    Vertigo        Past Surgical History:        Procedure Laterality Date    COLONOSCOPY  3-    LAPAROSCOPIC APPENDECTOMY  7/12/2013       Social History:    Social History     Tobacco Use    Smoking status: Never Smoker    Smokeless tobacco: Never Used   Substance Use Topics    Alcohol use: Yes     Comment: occ                                Counseling given: Not Answered      Vital Signs (Current):   Vitals:    08/15/20 1832 08/15/20 2029 08/16/20 0325 08/16/20 0836   BP: (!) 143/88 (!) 144/94 (!) 141/93 (!) 155/104   Pulse: 73 74 60 73   Resp: 17 20 10 18   Temp:  98.3 °F (36.8 °C) 98.6 °F (37 °C)    TempSrc:  Oral Oral    SpO2:   98% 98%   Weight:   220 lb 4.8 oz (99.9 kg)    Height:                                                  BP Readings from Last 3 Encounters:   08/16/20 (!) 155/104   08/05/20 138/80   02/26/20 (!) 118/90       NPO Status: Time of last liquid consumption: 0000                        Time of last solid consumption: 0000                        Date of last liquid consumption: 08/15/20                        Date of last solid food consumption: 08/15/20    BMI:   Wt Readings from Last 3 Encounters:   08/16/20 220 lb 4.8 oz (99.9 kg)   07/27/20 226 lb 6.4 oz (102.7 kg)   02/26/20 214 lb 12.8 oz (97.4 kg)     Body mass index is 29.88 kg/m².     CBC:   Lab Results   Component Value Date    WBC 18.7 08/15/2020    RBC 4.78 08/15/2020    HGB 15.4 08/15/2020    HCT 46.1 08/15/2020    MCV 96.4 08/15/2020    RDW 12.0 08/15/2020     08/15/2020       CMP:   Lab Results   Component Value Date     08/14/2020    K 3.6 08/14/2020     08/14/2020    CO2 18 08/14/2020    BUN 12 08/14/2020    CREATININE 0.5 08/14/2020    GFRAA >60 08/14/2020    GFRAA >60 06/03/2013    AGRATIO 2.1 02/26/2020    LABGLOM >60 08/14/2020    LABGLOM >60 03/08/2011    GLUCOSE 225 08/14/2020    PROT 7.2 08/14/2020    PROT 7.0 12/24/2012    CALCIUM 9.4 08/14/2020    BILITOT 0.5 08/14/2020    ALKPHOS 116 08/14/2020    AST 17 08/14/2020    ALT 22 08/14/2020       POC Tests:   Recent Labs     08/15/20  2037   POCGLU 191*       Coags: No results found for: PROTIME, INR, APTT    HCG (If Applicable): No results found for: PREGTESTUR, PREGSERUM, HCG, HCGQUANT     ABGs: No results found for: PHART, PO2ART, AFN0TQZ, BUJ9LJJ, BEART, P8ITCHGP     Type & Screen (If Applicable):  No results found for: LABABO, LABRH    Drug/Infectious Status (If Applicable):  No results found for: HIV, HEPCAB    COVID-19 Screening (If Applicable): No results found for: COVID19      Anesthesia Evaluation  Patient summary reviewed and Nursing notes reviewed no history of anesthetic complications:   Airway: Mallampati: II        Dental:          Pulmonary:   (+) sleep apnea:                             Cardiovascular:    (+) hypertension:,                ROS comment: Patient was advised to have a stress test and echo       Neuro/Psych:               GI/Hepatic/Renal:   (+) GERD:, hepatitis: C, liver disease:,           Endo/Other:    (+) DiabetesType I DM, , .                 Abdominal:           Vascular: Anesthesia Plan      general     ASA 3             Anesthetic plan and risks discussed with patient. Plan discussed with CRNA.                   Junior Anne MD   8/16/2020

## 2020-08-16 NOTE — PROGRESS NOTES
0.2 0 - 1 %    Segs Absolute 10.6 K/CU MM    Lymphocytes Absolute 1.5 K/CU MM    Monocytes Absolute 0.3 K/CU MM    Eosinophils Absolute 0.0 K/CU MM    Basophils Absolute 0.0 K/CU MM    Nucleated RBC % 0.0 %    Total Nucleated RBC 0.0 K/CU MM    Total Immature Neutrophil 0.07 K/CU MM    Immature Neutrophil % 0.6 (H) 0 - 0.43 %   CMP    Collection Time: 08/14/20  4:26 PM   Result Value Ref Range    Sodium 137 135 - 145 MMOL/L    Potassium 3.6 3.5 - 5.1 MMOL/L    Chloride 103 99 - 110 mMol/L    CO2 18 (L) 21 - 32 MMOL/L    BUN 12 6 - 23 MG/DL    CREATININE 0.5 (L) 0.9 - 1.3 MG/DL    Glucose 225 (H) 70 - 99 MG/DL    Calcium 9.4 8.3 - 10.6 MG/DL    Alb 4.7 3.4 - 5.0 GM/DL    Total Protein 7.2 6.4 - 8.2 GM/DL    Total Bilirubin 0.5 0.0 - 1.0 MG/DL    ALT 22 10 - 40 U/L    AST 17 15 - 37 IU/L    Alkaline Phosphatase 116 40 - 129 IU/L    GFR Non-African American >60 >60 mL/min/1.73m2    GFR African American >60 >60 mL/min/1.73m2    Anion Gap 16 4 - 16   Lipase    Collection Time: 08/14/20  4:26 PM   Result Value Ref Range    Lipase 124 (H) 13 - 60 IU/L   Troponin    Collection Time: 08/14/20  4:26 PM   Result Value Ref Range    Troponin T <0.010 <0.01 NG/ML   CK    Collection Time: 08/14/20  4:26 PM   Result Value Ref Range    Total  38 - 174 IU/L   Brain Natriuretic Peptide    Collection Time: 08/14/20  4:26 PM   Result Value Ref Range    Pro-BNP 23.99 <300 PG/ML   Magnesium    Collection Time: 08/14/20  4:26 PM   Result Value Ref Range    Magnesium 1.7 (L) 1.8 - 2.4 mg/dl   Lactic Acid, Plasma    Collection Time: 08/14/20  4:26 PM   Result Value Ref Range    Lactate 1.5 0.4 - 2.0 mMOL/L   EKG 12 Lead    Collection Time: 08/14/20  4:38 PM   Result Value Ref Range    Ventricular Rate 92 BPM    Atrial Rate 92 BPM    P-R Interval 168 ms    QRS Duration 98 ms    Q-T Interval 350 ms    QTc Calculation (Bazett) 432 ms    P Axis 40 degrees    R Axis 59 degrees    T Axis 36 degrees    Diagnosis       Normal sinus rhythm  Normal ECG  No previous ECGs available  Confirmed by LYNN Mckeon ([id number]) on 8/15/2020 6:47:29 PM     EKG 12 Lead    Collection Time: 08/14/20  5:28 PM   Result Value Ref Range    Ventricular Rate 91 BPM    Atrial Rate 91 BPM    P-R Interval 168 ms    QRS Duration 100 ms    Q-T Interval 350 ms    QTc Calculation (Bazett) 430 ms    P Axis 41 degrees    R Axis 63 degrees    T Axis 63 degrees    Diagnosis       Sinus rhythm with premature supraventricular complexes and with occasional premature ventricular complexes  Nonspecific ST abnormality  Abnormal ECG  When compared with ECG of 14-AUG-2020 16:38,  premature ventricular complexes are now present  premature supraventricular complexes are now present  Confirmed by LYNN Mckeon ([id number]) on 8/15/2020 6:49:41 PM     POCT Glucose    Collection Time: 08/14/20  9:58 PM   Result Value Ref Range    POC Glucose 244 (H) 70 - 99 MG/DL   CBC    Collection Time: 08/15/20  1:44 AM   Result Value Ref Range    WBC 18.7 (H) 4.0 - 10.5 K/CU MM    RBC 4.78 4.6 - 6.2 M/CU MM    Hemoglobin 15.4 13.5 - 18.0 GM/DL    Hematocrit 46.1 42 - 52 %    MCV 96.4 78 - 100 FL    MCH 32.2 (H) 27 - 31 PG    MCHC 33.4 32.0 - 36.0 %    RDW 12.0 11.7 - 14.9 %    Platelets 570 (L) 466 - 440 K/CU MM    MPV 10.2 7.5 - 11.1 FL   Troponin    Collection Time: 08/15/20  1:44 AM   Result Value Ref Range    Troponin T <0.010 <0.01 NG/ML   POCT Glucose    Collection Time: 08/15/20  3:57 AM   Result Value Ref Range    POC Glucose 198 (H) 70 - 99 MG/DL   Troponin    Collection Time: 08/15/20  7:37 AM   Result Value Ref Range    Troponin T <0.010 <0.01 NG/ML   TSH without Reflex    Collection Time: 08/15/20  7:37 AM   Result Value Ref Range    TSH, High Sensitivity 0.777 0.270 - 4.20 uIu/ml   POCT Glucose    Collection Time: 08/15/20 12:46 PM   Result Value Ref Range    POC Glucose 163 (H) 70 - 99 MG/DL   POCT Glucose    Collection Time: 08/15/20  6:32 PM   Result Value Ref Range    POC Glucose to at least 4x/day walk in the hallways with assistance. Respiratory nidia-operative care: Incentive Spirometry / deep breathing and coughing 10x/hr while awake. Continue DVT prophylaxis with Teds and SCDs and SC Lovenox. Continue GI prophylaxis with Protonix IV till able to tolerate PO.   Continue nidia-op Abx for 24 hrs after surgery then dc.    ___________________________________________    Lan Bynum MD, FACS, FICS  8/16/2020  6:56 AM

## 2020-08-16 NOTE — OP NOTE
OPERATIVE REPORT      Deloris Meherrin 1960, 61 y.o.,  male, CSN: [id number]  August 16, 2020    Preoperative diagnosis: Symptomatic Acute on top of chronic calculous cholecystitis. Postoperative diagnosis: Same. Procedure: Laparoscopic cholecystectomy. Surgeon: Treva Genao MD, FACS, FICS  Assistant Surgeon:  Jeanna Bardales  Anesthesia: General Endotracheal Anesthesia + Local 1% Xylocaine With Epinephrine, 0.5% Marcaine, mixed, 50% : 50%. Specimen(s): Gall Bladder was sent to permanent pathology. Estimated Blood Loss: Less than 5 ml. Drain: None. Complications: None. Condition/Disposition:  Stable, Extubated, to PACU    Indications: This 61y.o.-year-old male developed right upper quadrant pain/ nausea/ vomiting/ fever/ leukocytosis and on workup was found to have cholelithiasis with a normal common duct/ cholecystitis. Laparoscopic cholecystectomy was elected. After informed consent was signed, knowing the risks and benefits, possible alternatives and potential complications of the procedure including but not limited to: bleeding, infection, incisional hernia(s), injury to the common bile duct or right hepatic duct, bile leak, subhepatic abscess, retained common bile duct stones, bowel injury, need for interventional radiology procedures like drain placement or gastro-enterology procedure like ERCP/stent placements, and possibly the need for further surgeries. Patient is well aware of the rare yet possible need for conversion to open cholecystectomy  if conditions are not favorable. Description of procedure: The patient was appropriately identified in the holding area. Appropriate IV antibiotic was given on call to OR (operating room). Hibiclens abdominal skin prep was performed in the holding area and winifred hose and sequential compression devices were placed on the lower extremities bilaterally and activated. Pt voided her urinary bladder on call to OR.   The patient was then brought to the operating room, and placed on the operating table in the supine position. A time-out was completed verifying correct patient and procedure. The patient was then brought to the operating room, and placed on the operating table in the supine position. A time-out was completed verifying correct patient and procedure. An orogastric tube was placed by anesthesia, and connected to suction to decompress the stomach. Anesthesia endotracheally intubated the patient uneventfully and general endotracheal anesthesia was started. The abdomen was prepped and draped in the usual sterile fashion. The local anesthetic mixture mentioned above was used to infiltrate the planned incisions sites starting  intradermally raising a wheal at the skin level, and through the layers all the way to the preperitoneal level where another wheal was raised, preemptively before making any incisions and post operatively at the end of the procedure. A 1cm incision was made in a natural skin crease periumbilically, dissection was carried down to the fascia, the fascia and peritoneum were opened under direct visualization and the peritoneal cavity was entered safely; careful inspection revealed no bowels or solid organs noted in the vicinity of the incision. A figure-of-eight suture with 0-Vicryl was placed for future closure of this fascial defect at the end of the procedure. A 12-mm port was introduced through the fascia and pneumoperitoneum was instituted using CO2 (carbon dioxide) insufflation, up to 12-14 mmHg pressure. The patient tolerated insufflation well. A 5-mm/30 degree scope was introduced through the port, the abdomen was inspected and no injuries from initial trocar placement were noted.   Then under direct visualization three 5-mm ports were placed in the right upper quadrant / subcostal region: 2 finger-breadths below and parallel to the right costal margin, 8-10 cm apart one from the other: in the epigastrium, midclavicular line and anterior axillary line. The table was then placed in the reverse Trendelenburg position with the right side up. Filmy adhesions between the gallbladder and omentum/ duodenum/ transverse colon were lysed sharply. The fundus of the gallbladder was grasped with an atraumatic grasper passed through the lateral-most port and retracted superiorly and laterally. The  Aleman's pouch (infundibulum of the gall bladder) was also grasped with an atraumatic grasper through the midclavicular port and retracted laterally. These maneuvers exposed Calots triangle nicely. The anterior, and then the posterior peritoneum overlying the gallbladder's infundibulum were then incised meticulously and the cystic duct and cystic artery were both clearly identified and circumferentially skeletonized with gentle dissection. The cystic duct was triply clipped and sharply divided close to the gallbladder, leaving 2 clips on the common bile duct side and one on the gall bladder side of the cystic duct. Likewise, the cystic artery was triply clipped and divided using electrocautery, leaving 2 clips on the hepatic artery side and one on the gall bladder side of the cystic artery. The gallbladder was then dissected from its peritoneal attachments and from its gall bladder liver bed using the hook electrocautery. Hemostasis was secured all along the dissection and the gallbladder was retrieved in its entirety using the endo-catch bag (the endoscopic retrieval bag) introduced through the umbilical port after switching the camera to the epigastric / subxiphoid port . The gallbladder was passed off the table as a specimen after it was palpated for any neoplastic abnormalities, and none were noted. The gallbladder fossa was re-inspected, and irrigated as needed with saline and perfectly adequate hemostasis was noted.  There was no evidence of bleeding from the gallbladder fossa or cystic artery or leakage of the bile from the cystic duct stump or the liver bed. The orogastric tube was removed uneventfully   All three subcostal trocars were removed under direct visualization without any evidence of port site bleeding. The laparoscope was withdrawn and the umbilical trocar removed. The pneumoperitoneum was evacuated. The pre-placed 0-Vicryl suture was tied to approximate the fascial defect of the 12-mm trocar site. Further more local anesthetic was injected to all trocar sites. The skin was then  approximated using 4-0 Vicryl in a subcuticular fashion, followed by the application of \"Dermabond\" /  topical skin adhesive. The patient tolerated the procedure very well without any complications, was extubated in the OR and was taken to the PACU (postanesthesia care unit) in stable condition.     ___________________________________________    Lan Bynum MD, FACS, FICS  8/16/2020  10:17 AM

## 2020-08-16 NOTE — DISCHARGE SUMMARY
Clement Monahan 1960 [id number]  PCP:  Bassam Garcia DO    Admit date: 8/14/2020  Admitting Physician: Abdelrahman Amado MD    Discharge date: 8/16/2020 Discharge Physician: Abdelrahman Amado MD         Discharge Diagnoses. As per below    Hospital Course:  History of present illness at admission: As per H&P  Subsequent Hospital Course:      Chest pain no ACS. Continue aspirin, statin, low-dose Coreg. OP F/U with cardio.  Abdominal pain/Leukocytosis/Nausea and vomiting: Zofran and Protonix IV. Surgery evaluated and lap camila today. POD #0   Acute calculous cholecystitis s/p cholecystectomy today. Outpatient follow-up with surgery. Patient was discharged home today on Augmentin and pain control with Percocet.  Diabetes mellitus improved on on Lantus and SSI. Held oral meds while inpatient.       VTE prophylaxis LMWH    On the day of discharge, pt felt better. No new complaints.     Pertinent Exam Findings on Day of Discharge:  General Appearance:    Alert, cooperative, no distress, appears stated age  Head:    Normocephalic, without obvious abnormality, atraumatic  Eyes:    PERRL, conjunctiva/corneas clear, EOM's intact  Lungs:    Clear to auscultation bilaterally, respirations unlabored   Heart:    Regular rate and rhythm, S1 and S2 normal, no murmur,   rub or gallop  Abdomen:     Soft, non-tender, bowel sounds active, no masses, no organomegaly  Extremities:   Extremities normal, atraumatic, no cyanosis or edema    Consults:  IP CONSULT TO HOSPITALIST  IP CONSULT TO CARDIOLOGY  IP CONSULT TO GENERAL SURGERY    Patient Instructions:   Susan Parra   Home Medication Instructions [facility]:[id number]    Printed on:08/16/20 1649   Medication Information                      amoxicillin-clavulanate (AUGMENTIN) 875-125 MG per tablet  Take 1 tablet by mouth 2 times daily for 5 days             aspirin 81 MG chewable tablet  Take 1 tablet by mouth daily             atorvastatin (LIPITOR) 40 MG tablet  Take 1 tablet by mouth nightly             carvedilol (COREG) 3.125 MG tablet  Take 1 tablet by mouth 2 times daily (with meals)             Cholecalciferol (VITAMIN D3) 2000 UNITS CAPS  Take  by mouth daily. diclofenac sodium (VOLTAREN) 1 % GEL  Apply 2 g topically 4 times daily             insulin glargine (BASAGLAR KWIKPEN) 100 UNIT/ML injection pen  Inject 15 Units into the skin nightly             Insulin Pen Needle (PEN NEEDLES 31GX5/16\") 31G X 8 MM MISC  1 each by Does not apply route daily             JANUMET  MG per tablet  TAKE 1 TABLET BY MOUTH 2 TIMES DAILY (WITH MEALS)             lisinopril (PRINIVIL;ZESTRIL) 10 MG tablet  TAKE 1 TABLET BY MOUTH DAILY             loratadine (CLARITIN) 10 MG tablet  Take 10 mg by mouth as needed. meclizine (ANTIVERT) 25 MG tablet  Take 1 tablet by mouth 3 times daily as needed for Dizziness or Nausea. methocarbamol (ROBAXIN) 750 MG tablet  TAKE 1 TABLET BY MOUTH TWICE A DAY AS NEEDED FOR BACK PAIN             omeprazole (PRILOSEC) 40 MG delayed release capsule  TAKE ONE CAPSULE BY MOUTH DAILY             oxyCODONE-acetaminophen (PERCOCET) 5-325 MG per tablet  Take 1 tablet by mouth every 4 hours as needed for Pain for up to 5 days. sennosides-docusate sodium (SENOKOT-S) 8.6-50 MG tablet  Take 2 tablets by mouth 2 times daily for 15 days             sildenafil (VIAGRA) 100 MG tablet  Take 1 tablet by mouth as needed (urinary obstruction)                   Diet:  DIET LOW FAT; No Caffeine    Activity:   activity as tolerated     Discharge Condition:   good    Disposition:   home    Follow-up    Galen Cottrell DO  Go to  Medical Management  [facility] [address]  [phone]   Naomi Mccarthy MD  In 1 week  Post Operative check  [address] [place] [address] [facility]   Lyle Bañuelos MD  Go to  Medical Management of Chest pain  [address] [address]  [phone]       Time spent on discharge in the examination, evaluation, counseling and review of medications and discharge plan: 32 minutes       Discharge Physician Signed: Bernadette Emery

## 2020-08-16 NOTE — ANESTHESIA POSTPROCEDURE EVALUATION
Department of Anesthesiology  Postprocedure Note    Patient: Chau Jensen  MRN: [id number]  YOB: 1960  Date of evaluation: 8/16/2020  Time:  10:21 AM     Procedure Summary     Date:  08/16/20 Room / Location:  [address] / [facility]    Anesthesia Start:  1861 Anesthesia Stop:      Procedure:  CHOLECYSTECTOMY LAPAROSCOPIC (N/A Abdomen) Diagnosis:  (cholelithiasis)    Surgeon:  Taco Medrano MD Responsible Provider:  IFEANYI Feng CRNA    Anesthesia Type:  general ASA Status:  3          Anesthesia Type: general      Last vitals: Reviewed and per EMR flowsheets.        Anesthesia Post Evaluation    Patient location during evaluation: PACU  Level of consciousness: awake  Airway patency: patent  Nausea & Vomiting: no nausea and no vomiting  Complications: no  Cardiovascular status: hemodynamically stable  Respiratory status: acceptable  Hydration status: euvolemic

## 2020-08-16 NOTE — PROGRESS NOTES
Patient is going for cholecystectomy today due to ongoing symptoms.   From cardiology point of view he is having PVCs but started on carvedilol yesterday  I doubt his right flank pain is cardiac in etiology  He can proceed with gallbladder surgery he is moderate risk  Continue carvedilol  Replace magnesium  D/w surgery

## 2020-08-16 NOTE — PROGRESS NOTES
Cardiology Progress Note     Admit Date:  8/14/2020    Consult reason/ Seen today for :   Dizziness     Subjective and  Overnight Events : Discussed with surgery underwent cholecystectomy today      Chief complain on admission : 61 y. o.year old who is admitted for  Chief Complaint   Patient presents with    Chest Pain      Assessment / Plan:  Follow-up in office for further work-up  HTN: stable, continue To titrate up medication as needed  DVT Prophylaxis if no contraindication  Status post: Cholecystectomy tolerated well     Past medical history:    has a past medical history of Colitis, Diabetes mellitus ([address] DJD (degenerative joint disease) of lumbar spine, External hemorrhoids, Family history of lung cancer, Gallstones, GERD (gastroesophageal reflux disease), H/O cardiovascular stress test, Hepatitis C, Hilar adenopathy, Hypertension, Lipoma, Multiple lung nodules, Polyp, sigmoid colon, Pulmonary nodules, Sleep apnea, obstructive, and Vertigo. Past surgical history:   has a past surgical history that includes laparoscopic appendectomy (7/12/2013) and Colonoscopy (3-). Social History:   reports that he has never smoked. He has never used smokeless tobacco. He reports current alcohol use. He reports that he does not use drugs. Family history:  family history is not on file.     Allergies   Allergen Reactions    Lansoprazole      diarrhea  cramping       Review of Systems:    All 14 systems were reviewed and are negative  Except for the positive findings  which as documented     BP (!) 151/78   Pulse 73   Temp 98 °F (36.7 °C) (Temporal)   Resp 16   Ht 6' (1.829 m)   Wt 220 lb 4.8 oz (99.9 kg)   SpO2 97%   BMI 29.88 kg/m²       Intake/Output Summary (Last 24 hours) at 8/16/2020 1502  Last data filed at 8/16/2020 1046  Gross per 24 hour   Intake 100 ml   Output 1 ml   Net 99 ml     Physical Exam:  Constitutional:  Well developed, Well nourished, No acute distress, Non-toxic appearance. HENT:  Normocephalic, Atraumatic, Bilateral external ears normal, Oropharynx moist, No oral exudates, Nose normal. Neck- Normal range of motion, No tenderness, Supple, No stridor. Eyes:  PERRL, EOMI, Conjunctiva normal, No discharge. Respiratory:  Normal breath sounds, No respiratory distress, No wheezing, No chest tenderness. Cardiovascular:  Normal heart rate, Normal rhythm, No murmurs, No rubs, No gallops, JVP not elevated  Abdomen/GI:  Bowel sounds normal, Soft, No tenderness, No masses, No pulsatile masses. Musculoskeletal:  Intact distal pulses, No edema, No tenderness, No cyanosis, No clubbing. Good range of motion in all major joints. No tenderness to palpation or major deformities noted. Back- No tenderness. Integument:  Warm, Dry, No erythema, No rash. Lymphatic:  No lymphadenopathy noted. Neurologic:  Alert & oriented x 3, Normal motor function, Normal sensory function, No focal deficits noted.    Psychiatric:  Affect  and  Mood :no change    Medications:    sennosides-docusate sodium  2 tablet Oral BID    carvedilol  3.125 mg Oral BID WC    ceFAZolin (ANCEF) 2 g in dextrose 5 % 100 mL IVPB  2 g Intravenous Q8H    dicyclomine  20 mg Intramuscular Once    insulin glargine  15 Units Subcutaneous Nightly    lisinopril  10 mg Oral Daily    methocarbamol  750 mg Oral TID    pantoprazole  40 mg Oral QAM AC    sodium chloride flush  10 mL Intravenous 2 times per day    atorvastatin  40 mg Oral Nightly    aspirin  81 mg Oral Daily    enoxaparin  40 mg Subcutaneous Daily    insulin lispro  0-6 Units Subcutaneous TID     insulin lispro  0-3 Units Subcutaneous Nightly      sodium chloride 100 mL/hr at 08/16/20 1042     oxyCODONE-acetaminophen, nitroGLYCERIN, HYDROmorphone, ondansetron, morphine, dicyclomine, meclizine, traMADol, sodium chloride flush, acetaminophen **OR** acetaminophen, polyethylene glycol, promethazine **OR** ondansetron, nitroGLYCERIN    Lab Data:  CBC:   Recent Labs     08/14/20  1626 08/15/20  0144   WBC 12.5* 18.7*   HGB 17.5 15.4   HCT 51.6 46.1   MCV 95.7 96.4   * 136*     BMP:   Recent Labs     08/14/20  1337 08/14/20  1626   NA  --  137   K  --  3.6   CL  --  103   CO2  --  18*   BUN  --  12   CREATININE 0.6* 0.5*     PT/INR: No results for input(s): PROTIME, INR in the last 72 hours. BNP:    Recent Labs     08/14/20  1626   PROBNP 23.99     TROPONIN:   Recent Labs     08/14/20  1626 08/15/20  0144 08/15/20  0737   TROPONINT <0.010 <0.010 <0.010        ECHO :   echocardiogram    Assessment:  61 y. o.year old who is admitted for  Chief Complaint   Patient presents with    Chest Pain    , active issues as noted below:  Impression:  Active Problems:    Chest pain    Abdominal pain    Leukocytosis    Nausea and vomiting    CCC (chronic calculous cholecystitis)    Acute calculous cholecystitis  Resolved Problems:    * No resolved hospital problems. *            All labs, medications and tests reviewed by myself , continue all other medications of all above medical condition listed as is except for changes mentioned above. Thank you very much for consult , please call with questions.     Mal Kaba MD 8/16/2020 3:02 PM

## 2020-08-16 NOTE — PROGRESS NOTES
Hospitalist Progress Note         Admit Date: 8/14/2020    PCP: Julito Garcia DO     Chief Complaint   Patient presents with    Chest Pain        Assessment and Plan:      Chest pain no ACS. Continue aspirin, statin, low-dose Coreg. Echo pending   Abdominal pain/Leukocytosis/Nausea and vomiting: Zofran and Protonix IV. Surgery evaluated and lap camila today.  Acute calculous cholecystitis lab cholecystectomy today.  Diabetes mellitus continue Lantus and SSI. Hold oral meds. Follow Accu-Cheks    VTE prophylaxis LMWH  DC plans hopefully in 1-2 days  Change admission status to inpatient. For full H&P please review note from 8/14/2020.     Current Facility-Administered Medications   Medication Dose Route Frequency Provider Last Rate Last Dose    oxyCODONE-acetaminophen (PERCOCET) 5-325 MG per tablet 2 tablet  2 tablet Oral Q4H PRN Malathi Martin MD        senna-docusate (PERICOLACE) 8.6-50 MG per tablet 2 tablet  2 tablet Oral BID Malathi Martin MD        labetalol (NORMODYNE;TRANDATE) injection 5 mg  5 mg Intravenous Q10 Min PRN Natacha Lino MD        hydrALAZINE (APRESOLINE) injection 5 mg  5 mg Intravenous Q10 Min PRN Natacha Lino MD        fentaNYL (SUBLIMAZE) injection 25 mcg  25 mcg Intravenous Q5 Min PRN Natacha Lino MD        HYDROmorphone (DILAUDID) injection 0.5 mg  0.5 mg Intravenous Q5 Min PRN Natacha Lino MD   0.5 mg at 08/16/20 1036    ondansetron (ZOFRAN) injection 4 mg  4 mg Intravenous Once PRN Natacha Lino MD        carvedilol (COREG) tablet 3.125 mg  3.125 mg Oral BID WC Malathi Martin MD   3.125 mg at 08/16/20 0659    nitroGLYCERIN (NITROSTAT) SL tablet 0.4 mg  0.4 mg Sublingual Q5 Min PRN Malathi Martin MD        ceFAZolin (ANCEF) 2 g in dextrose 5 % 100 mL IVPB  2 g Intravenous Tadeo Perez MD   Stopped at 08/16/20 0730    HYDROmorphone (DILAUDID) injection 1 mg  1 mg Intravenous Q2H PRN Malathi Martin MD   1 mg at 08/15/20 1403    ondansetron (ZOFRAN) injection 4 mg  4 mg Intravenous Q30 Min PRN Naomi Mccarthy MD        dicyclomine (BENTYL) injection 20 mg  20 mg Intramuscular Once Naomi Mccarthy MD        morphine sulfate (PF) injection 4 mg  4 mg Intravenous Q30 Min PRN Naomi Mccarthy MD   4 mg at 08/14/20 1636    dicyclomine (BENTYL) capsule 10 mg  10 mg Oral TID PRN Naomi Mccarthy MD   10 mg at 08/15/20 2031    insulin glargine (LANTUS) injection vial 15 Units  15 Units Subcutaneous Nightly Naomi Mccarthy MD   15 Units at 08/15/20 2104    lisinopril (PRINIVIL;ZESTRIL) tablet 10 mg  10 mg Oral Daily Naomi Mccarthy MD   10 mg at 08/15/20 3490    meclizine (ANTIVERT) tablet 25 mg  25 mg Oral TID PRN Naomi Mccarthy MD        methocarbamol (ROBAXIN) tablet 750 mg  750 mg Oral TID Naomi Mccarthy MD   750 mg at 08/15/20 2030    pantoprazole (PROTONIX) tablet 40 mg  40 mg Oral QAM AC Naomi Mccarthy MD   40 mg at 08/16/20 0659    traMADol (ULTRAM) tablet 50 mg  50 mg Oral Q6H PRN Naomi Mccarthy MD   50 mg at 08/15/20 2030    sodium chloride flush 0.9 % injection 10 mL  10 mL Intravenous 2 times per day Naomi Mccarthy MD   10 mL at 08/15/20 2031    sodium chloride flush 0.9 % injection 10 mL  10 mL Intravenous PRN Naomi Mccarthy MD        acetaminophen (TYLENOL) tablet 650 mg  650 mg Oral Q6H PRN Naomi Mccarthy MD        Or   [facility] acetaminophen (TYLENOL) suppository 650 mg  650 mg Rectal Q6H PRN Naomi Mccarthy MD        polyethylene glycol (GLYCOLAX) packet 17 g  17 g Oral Daily PRN Naomi Mccarthy MD        promethazine (PHENERGAN) tablet 12.5 mg  12.5 mg Oral Q6H PRN Naomi Mccarthy MD        Or    ondansetron (ZOFRAN) injection 4 mg  4 mg Intravenous Q6H PRN Naomi Mccarthy MD        atorvastatin (LIPITOR) tablet 40 mg  40 mg Oral Nightly Naomi Mccarthy MD   40 mg at 08/15/20 2031    aspirin chewable tablet 81 mg  81 mg Oral Daily Naomi Mccarthy MD   81 mg at 08/15/20 0839    enoxaparin (LOVENOX) injection 40 mg  40 mg Subcutaneous Daily Calvin Pneg MD   40 mg at 08/15/20 0843    nitroGLYCERIN (NITROSTAT) SL tablet 0.4 mg  0.4 mg Sublingual Q5 Min PRN Calvin Peng MD        insulin lispro (HUMALOG) injection vial 0-6 Units  0-6 Units Subcutaneous TID WC Calvin Peng MD   1 Units at 08/15/20 1832    insulin lispro (HUMALOG) injection vial 0-3 Units  0-3 Units Subcutaneous Nightly Calvin Peng MD   1 Units at 08/15/20 2040    0.9 % sodium chloride infusion   Intravenous Continuous Calvin Peng  mL/hr at 08/16/20 1042         Subjective:     Patient continues to report right-sided abdominal pain. N.p.o. for cholecystectomy. No acute overnight events. Objective: Intake/Output Summary (Last 24 hours) at 8/16/2020 1145  Last data filed at 8/16/2020 1046  Gross per 24 hour   Intake 100 ml   Output 1 ml   Net 99 ml      Vitals:   Vitals:    08/16/20 1053   BP: (!) 151/78   Pulse: 73   Resp: 16   Temp: 98 °F (36.7 °C)   SpO2: 97%     Physical Exam:  General Appearance:    Alert, cooperative, mild distress +  Head:      Normocephalic, without obvious abnormality, atraumatic  Eyes:       Conjunctiva/corneas clear, EOM's intact  Lungs:    CTA B/L +  Heart:                RRR, S1 and S2 normal, no murmur,   rub or gallop  Abdomen:     Soft, ND/RUQ tenderness +, BS +  Extremities:   Extremities normal, atraumatic, no cyanosis or edema  Neurological:   Grossly Intact. Significant Diagnostic Studies:   DATA:    CBC   Recent Labs     08/14/20  1626 08/15/20  0144   WBC 12.5* 18.7*   HGB 17.5 15.4   HCT 51.6 46.1   * 136*      BMP   Recent Labs     08/14/20  1337 08/14/20  1626   NA  --  137   K  --  3.6   CL  --  103   CO2  --  18*   BUN  --  12   CREATININE 0.6* 0.5*     LFT'S   Recent Labs     08/14/20  1626   AST 17   ALT 22   BILITOT 0.5   ALKPHOS 116     COAG No results for input(s): INR in the last 72 hours.   POC:   Lab fracture of the right 4th rib which appears similar to prior radiograph from April 22, 2018 and CT chest from 2010. 1. No acute right-sided rib fracture identified. 2. No acute cardiopulmonary process identified. 3. Redemonstration of left-sided pulmonary nodules measuring approximately 1.6 and 1.5 cm with overall grossly stable appearance compared with radiograph from August 22, 2018 accounting for differences in measuring technique. Ct Abdomen W Contrast Additional Contrast? Oral    Result Date: 8/14/2020  EXAMINATION: CT ABDOMEN WITH CONTRAST 8/14/2020 1:14 pm TECHNIQUE: CT of the abdomen was performed with the administration of intravenous contrast. Multiplanar reformatted images are provided for review. Dose modulation, iterative reconstruction, and/or weight based adjustment of the mA/kV was utilized to reduce the radiation dose to as low as reasonably achievable. COMPARISON: 03/03/2015 HISTORY: ORDERING SYSTEM PROVIDED HISTORY: Rib pain on right side TECHNOLOGIST PROVIDED HISTORY: With only per Dr. Davide Cruz Additional Contrast?->Oral Reason for exam:->Pain on the underside of right rib cage Reason for Exam: Rib pain RT side Acuity: Chronic Type of Exam: Ongoing Additional signs and symptoms: patient states the RT rib pain has been going on x 2 years; patient states doctor previously told him he had a broken rib; patient states bilat pain x 6 months tand LBP that started this morning Relevant Medical/Surgical History: patient states the RT rib pain has been going on x 2 years; patient states doctor previously told him he had a broken rib; patient states bilat pain x 6 months tand LBP that started this morning FINDINGS: Lower Chest: Lung bases are clear. Organs: The liver, spleen, pancreas and adrenal glands are without focal abnormality. There is cholelithiasis. No biliary duct dilation. The kidneys enhance symmetrically. No renal or ureteral calculus. No hydronephrosis or perinephric stranding. GI/Bowel: The visualized bowel demonstrates no dilation or wall thickening. No extraluminal contrast or free air. A few scattered colonic diverticula without acute diverticulitis. The aorta is normal in caliber. The celiac axis, SMA and BEE are patent. The portal venous system is patent. No pathologically enlarged adenopathy. There is mild stranding within the mesentery. Peritoneum/Retroperitoneum: Nonspecific similar to the prior exam but more conspicuous on today's exam.  No ascites or drainable fluid collection. Bones/Soft Tissues: No acute findings in the bones or soft tissues. Degenerative changes of the thoracolumbar spine. No acute abdominal abnormality. Xr Chest Portable    Result Date: 8/14/2020  EXAMINATION: ONE XRAY VIEW OF THE CHEST 8/14/2020 5:17 pm COMPARISON: 08/14/2020, earlier today HISTORY: ORDERING SYSTEM PROVIDED HISTORY: CP TECHNOLOGIST PROVIDED HISTORY: Reason for exam:->CP Reason for Exam: chest pain Acuity: Acute Type of Exam: Initial Additional signs and symptoms: na Relevant Medical/Surgical History: na FINDINGS: No focal consolidation, pleural effusion or pneumothorax. There is a 12 mm left pulmonary nodule slightly decreased in size when compared to the prior exam.  2nd left pulmonary nodule is not well seen on this study. The cardiomediastinal silhouette is stable. No overt pulmonary edema. The osseous structures are stable. No acute cardiopulmonary findings. 12 mm left pulmonary nodule slightly decreased in size when compared to the prior exam.  2nd pulmonary nodule in the left lung is not well seen on this study. Cta Chest W Contrast    Result Date: 8/14/2020  EXAMINATION: CTA OF THE ABDOMEN AND PELVIS WITH CONTRAST; CTA OF THE CHEST 8/14/2020 6:13 pm: TECHNIQUE: CTA of the abdomen and pelvis was performed with the administration of intravenous contrast. Multiplanar reformatted images are provided for review. MIP images are provided for review.  Dose measuring 7 mm (image 53) also demonstrating increased calcifications. Slowly increasing size of a 13 mm cavitary nodule within the left lower lobe previously measuring 8 mm on 03/03/2015. There are several other subcentimeter solid nodules which are not significantly changed (for example on the right images 66, 69; and on the left images 67 and 75). There is also a new 7 mm cavitary nodule in the anterior right upper lobe (image 37). Many of these nodules including the smaller nodules demonstrate associated calcification. No pleural effusion or pneumothorax. The central airways are patent. The esophagus is mildly patulous. Osteopenia. No definite acute osseous abnormality. CTA ABDOMEN: The abdominal aorta is normal in caliber. The branch vessels are patent. The liver, spleen, pancreas, and kidneys and adrenal glands are without focal abnormality when compared to earlier exam.  No dilated loops of bowel or bowel wall thickening. No extraluminal contrast or free air. The appendix is not visualized. There is cholelithiasis. No pathologically enlarged adenopathy. No ascites or drainable fluid collection. Osteopenia. No acute osseous abnormality. CTA PELVIS: Arterial vessels are patent without stenosis. No pathologically enlarged adenopathy or free fluid. No bladder wall thickening or filling defect. Mild prostatic hypertrophy. No acute osseous abnormality. Osteopenia. 1. Normal caliber aorta without acute aortic pathology. 2. Multiple pulmonary nodules bilaterally some of which are new since 2015 or 2010 or have increased in size. Some of these are partially cavitary in appearance. However, there has been interval development of calcification involving majority of these nodules favoring a benign process and healed infectious or inflammatory process. Recommend correlation with any history of malignancy. Otherwise these can be followed per clinical protocol or in 3-6 months.  3. Dependent atelectasis with no other acute cardiopulmonary findings. 4. No acute findings in the abdomen or pelvis. 5. Cholelithiasis. Cta Abdomen Pelvis W Contrast    Result Date: 8/14/2020  EXAMINATION: CTA OF THE ABDOMEN AND PELVIS WITH CONTRAST; CTA OF THE CHEST 8/14/2020 6:13 pm: TECHNIQUE: CTA of the abdomen and pelvis was performed with the administration of intravenous contrast. Multiplanar reformatted images are provided for review. MIP images are provided for review. Dose modulation, iterative reconstruction, and/or weight based adjustment of the mA/kV was utilized to reduce the radiation dose to as low as reasonably achievable.; CTA of the chest was performed after the administration of intravenous contrast. Multiplanar reformatted images are provided for review. MIP images are provided for review. Dose modulation, iterative reconstruction, and/or weight based adjustment of the mA/kV was utilized to reduce the radiation dose to as low as reasonably achievable. COMPARISON: 08/14/2020, earlier today, 03/03/2015, 04/26/2010 HISTORY: ORDERING SYSTEM PROVIDED HISTORY: abd pain/diaphoresis TECHNOLOGIST PROVIDED HISTORY: Reason for exam:->abd pain/diaphoresis Reason for Exam: abd pain/diaphoresis/ emesis Acuity: Acute Type of Exam: Initial Relevant Medical/Surgical History: pt had ct abd/pel w/c earlier today, hx hepatitis C, colitis; ORDERING SYSTEM PROVIDED HISTORY: other TECHNOLOGIST PROVIDED HISTORY: Reason for exam:->other Reason for Exam: chest pain/ abd pain/ nausea/vomiting Acuity: Acute Type of Exam: Initial Relevant Medical/Surgical History: patient had ct abd/pel w/c earlier today, hx hepatitis C, colitis FINDINGS: CTA CHEST: Thoracic aorta is normal in caliber. No acute aortic dissection or pseudoaneurysm. Mild aortic atherosclerosis. The main pulmonary artery is normal in caliber without subtle embolus. Coronary arterial calcifications. The heart size is within normal limits.   Trace pericardial effusion. No pathologically enlarged adenopathy. Bibasilar dependent atelectasis. There is a stable 16 mm nodule within the medial right upper lobe along the hilum (image 48). There is a partially calcified 12 mm nodule in the left upper lobe slightly increased from 2010 but more calcified when compared to that exam suggestive of benign process. There has also been slight interval increase in size of a nodule in the posterior superior left lower lobe measuring 12 mm previously measuring 7 mm (image 53) also demonstrating increased calcifications. Slowly increasing size of a 13 mm cavitary nodule within the left lower lobe previously measuring 8 mm on 03/03/2015. There are several other subcentimeter solid nodules which are not significantly changed (for example on the right images 66, 69; and on the left images 67 and 75). There is also a new 7 mm cavitary nodule in the anterior right upper lobe (image 37). Many of these nodules including the smaller nodules demonstrate associated calcification. No pleural effusion or pneumothorax. The central airways are patent. The esophagus is mildly patulous. Osteopenia. No definite acute osseous abnormality. CTA ABDOMEN: The abdominal aorta is normal in caliber. The branch vessels are patent. The liver, spleen, pancreas, and kidneys and adrenal glands are without focal abnormality when compared to earlier exam.  No dilated loops of bowel or bowel wall thickening. No extraluminal contrast or free air. The appendix is not visualized. There is cholelithiasis. No pathologically enlarged adenopathy. No ascites or drainable fluid collection. Osteopenia. No acute osseous abnormality. CTA PELVIS: Arterial vessels are patent without stenosis. No pathologically enlarged adenopathy or free fluid. No bladder wall thickening or filling defect. Mild prostatic hypertrophy. No acute osseous abnormality. Osteopenia.      1. Normal caliber aorta without acute aortic pathology. 2. Multiple pulmonary nodules bilaterally some of which are new since 2015 or 2010 or have increased in size. Some of these are partially cavitary in appearance. However, there has been interval development of calcification involving majority of these nodules favoring a benign process and healed infectious or inflammatory process. Recommend correlation with any history of malignancy. Otherwise these can be followed per clinical protocol or in 3-6 months. 3. Dependent atelectasis with no other acute cardiopulmonary findings. 4. No acute findings in the abdomen or pelvis. 5. Cholelithiasis.            [facility]

## 2020-08-16 NOTE — PLAN OF CARE
Problem: Pain:  Description: Pain management should include both nonpharmacologic and pharmacologic interventions.   Goal: Pain level will decrease  Description: Pain level will decrease  6/40/0130 [id number] by Janusz Bedolla RN  Outcome: Ongoing  8/15/2020 1854 by Jason Hdz RN  Outcome: Ongoing  Goal: Control of acute pain  Description: Control of acute pain  9/73/7004 0082 by Janusz Bedolla RN  Outcome: Ongoing  8/15/2020 1854 by Jason Hdz RN  Outcome: Ongoing  Goal: Control of chronic pain  Description: Control of chronic pain  4/19/0630 9950 by Janusz Bedolla RN  Outcome: Ongoing  8/15/2020 1854 by Jason Hdz RN  Outcome: Ongoing     Problem: Falls - Risk of:  Goal: Will remain free from falls  Description: Will remain free from falls  Outcome: Ongoing  Goal: Absence of physical injury  Description: Absence of physical injury  Outcome: Ongoing

## 2020-08-17 ENCOUNTER — TELEPHONE (OUTPATIENT)
Dept: BARIATRICS/WEIGHT MGMT | Age: 60
End: 2020-08-17

## 2020-08-17 NOTE — TELEPHONE ENCOUNTER
Pt called stating the pharmacy would not fill the percocet for the quantity that was written, needed a PA. Pharmacy filled it for 18 and pt paid cash. Pt is also c/o neck pain as well.  Please advise

## 2020-08-18 ENCOUNTER — TELEPHONE (OUTPATIENT)
Dept: BARIATRICS/WEIGHT MGMT | Age: 60
End: 2020-08-18

## 2020-08-18 NOTE — TELEPHONE ENCOUNTER
Pt needs a new script written for percocet.  Since pharmacy filled 18 before PA they will not fill the rest.

## 2020-08-19 RX ORDER — OXYCODONE HYDROCHLORIDE AND ACETAMINOPHEN 5; 325 MG/1; MG/1
1-2 TABLET ORAL EVERY 6 HOURS PRN
Qty: 12 TABLET | Refills: 0 | Status: SHIPPED | OUTPATIENT
Start: 2020-08-19 | End: 2020-08-26 | Stop reason: SDUPTHER

## 2020-08-21 ENCOUNTER — OFFICE VISIT (OUTPATIENT)
Dept: INTERNAL MEDICINE CLINIC | Age: 60
End: 2020-08-21
Payer: COMMERCIAL

## 2020-08-21 ENCOUNTER — TELEPHONE (OUTPATIENT)
Dept: INTERNAL MEDICINE CLINIC | Age: 60
End: 2020-08-21

## 2020-08-21 VITALS
WEIGHT: 261.8 LBS | DIASTOLIC BLOOD PRESSURE: 83 MMHG | BODY MASS INDEX: 35.51 KG/M2 | OXYGEN SATURATION: 97 % | SYSTOLIC BLOOD PRESSURE: 162 MMHG | HEART RATE: 83 BPM

## 2020-08-21 LAB — HBA1C MFR BLD: 9.9 %

## 2020-08-21 PROCEDURE — 1111F DSCHRG MED/CURRENT MED MERGE: CPT | Performed by: FAMILY MEDICINE

## 2020-08-21 PROCEDURE — 99215 OFFICE O/P EST HI 40 MIN: CPT | Performed by: FAMILY MEDICINE

## 2020-08-21 PROCEDURE — 83036 HEMOGLOBIN GLYCOSYLATED A1C: CPT | Performed by: FAMILY MEDICINE

## 2020-08-21 RX ORDER — INSULIN GLARGINE AND LIXISENATIDE 100; 33 U/ML; UG/ML
15 INJECTION, SOLUTION SUBCUTANEOUS
Qty: 3 PEN | Refills: 1 | Status: SHIPPED | OUTPATIENT
Start: 2020-08-21 | End: 2020-09-20

## 2020-08-21 NOTE — PROGRESS NOTES
Post-Discharge Transitional Care Management Services or Hospital Follow Up      Cain Landry   YOB: 1960    Date of Office Visit:  8/21/2020  Date of Hospital Admission: 8/14/20  Date of Hospital Discharge: 8/16/20  Risk of hospital readmission (high >=14%. Medium >=10%) :Readmission Risk Score: 12      Care management risk score Rising risk (score 2-5) and Complex Care (Scores >=6): 2     Non face to face  following discharge, date last encounter closed (first attempt may have been earlier): *No documented post hospital discharge outreach found in the last 14 days    Call initiated 2 business days of discharge: *No response recorded in the last 14 days    Patient Active Problem List   Diagnosis    Essential hypertension    Hepatitis C    Colitis    Mixed hyperlipidemia    Vitamin D deficiency    Type 2 diabetes mellitus without complication ([facility] [address]    Multiple lung nodules    Hilar adenopathy    Family history of lung cancer    Peyronie disease    Gastroesophageal reflux disease without esophagitis    Hx of cholecystectomy       Allergies   Allergen Reactions    Lansoprazole      diarrhea  cramping       Medications listed as ordered at the time of discharge from hospital   Loretta TRUJILLO   Home Medication Instructions NEO:    Printed on:08/22/20 1032   Medication Information                      aspirin 81 MG chewable tablet  Take 1 tablet by mouth daily             atorvastatin (LIPITOR) 40 MG tablet  Take 1 tablet by mouth nightly             carvedilol (COREG) 3.125 MG tablet  Take 1 tablet by mouth 2 times daily (with meals)             Cholecalciferol (VITAMIN D3) 2000 UNITS CAPS  Take  by mouth daily.              diclofenac sodium (VOLTAREN) 1 % GEL  Apply 2 g topically 4 times daily             Insulin Glargine-Lixisenatide (SOLIQUA) 100-33 UNT-MCG/ML SOPN  Inject 15 Units/oz/day into the skin every morning (before breakfast)             Insulin Pen Needle (PEN NEEDLES tablets by mouth 2 times daily for 15 days 60 tablet 0    [] amoxicillin-clavulanate (AUGMENTIN) 875-125 MG per tablet Take 1 tablet by mouth 2 times daily for 5 days 10 tablet 0    lisinopril (PRINIVIL;ZESTRIL) 10 MG tablet TAKE 1 TABLET BY MOUTH DAILY 30 tablet 6    diclofenac sodium (VOLTAREN) 1 % GEL Apply 2 g topically 4 times daily 1 Tube 0    omeprazole (PRILOSEC) 40 MG delayed release capsule TAKE ONE CAPSULE BY MOUTH DAILY 30 capsule 11    Insulin Pen Needle (PEN NEEDLES 31GX5/16\") 31G X 8 MM MISC 1 each by Does not apply route daily 100 each 3    methocarbamol (ROBAXIN) 750 MG tablet TAKE 1 TABLET BY MOUTH TWICE A DAY AS NEEDED FOR BACK PAIN 50 tablet 5    sildenafil (VIAGRA) 100 MG tablet Take 1 tablet by mouth as needed (urinary obstruction) 30 tablet 3    meclizine (ANTIVERT) 25 MG tablet Take 1 tablet by mouth 3 times daily as needed for Dizziness or Nausea. 30 tablet 1    Cholecalciferol (VITAMIN D3) 2000 UNITS CAPS Take  by mouth daily.  loratadine (CLARITIN) 10 MG tablet Take 10 mg by mouth as needed. Medications patient taking as of now reconciled against medications ordered at time of hospital discharge: No    Chief Complaint   Patient presents with   [address] from Hospital       History of Present illness - Follow up of Hospital diagnosis(es): Please review patient plan below    Inpatient course: Discharge summary reviewed- see chart. Interval history/Current status: Stable and improving. A comprehensive review of systems was negative except for what was noted in the HPI. Vitals:    20 0923   BP: (!) 162/83   Pulse: 83   SpO2: 97%   Weight: 261 lb 12.8 oz (118.8 kg)     Body mass index is 35.51 kg/m².    Wt Readings from Last 3 Encounters:   20 261 lb 12.8 oz (118.8 kg)   20 220 lb 4.8 oz (99.9 kg)   20 226 lb 6.4 oz (102.7 kg)     BP Readings from Last 3 Encounters:   20 (!) 162/83   20 (!) 151/78   20 (!) 150/91 Physical Exam:  Physical Exam   Constitutional: He is oriented to person, place, and time. He appears well-developed and well-nourished. HENT:   Head: Normocephalic and atraumatic. Mouth/Throat: Oropharynx is clear and moist. No oropharyngeal exudate. Eyes: Pupils are equal, round, and reactive to light. Neck: Normal range of motion. Neck supple. Cardiovascular: Normal rate, regular rhythm and normal heart sounds. Pulmonary/Chest: Effort normal and breath sounds normal. He has no wheezes. Abdominal: Soft. He exhibits no distension. Mild tenderness   Musculoskeletal: Normal range of motion. Neurological: He is alert and oriented to person, place, and time. Skin: Skin is warm and dry. Psychiatric: He has a normal mood and affect. His behavior is normal. Judgment and thought content normal.         Assessment/Plan:  1. Hospital discharge follow-up  Patient was discharged from the hospital after emergency cholecystectomy secondary to gallstones. - [place] DISCHARGE MEDS RECONCILED W/ CURRENT OUTPATIENT MED LIST    2. Hx of cholecystectomy  - Patient's gallbladder was taken out on August 16, 2020. Since then, patient is progressively improving. 3. Type 2 diabetes mellitus without complication, without long-term current use of insulin (HCC)  - Uncontrolled with last hemoglobin A1c of 11.4%. Last visit we started on Basaglar which reduces A1c to 9.9%. - Discontinuing patient medication Janumet.   - Starting the patient on metformin 1000 mg and Soliqua. Patient will take Ivan Montgomery before breakfast and titrate with morning sugar. Morning sugar goal is between 140 and 160. Patient will titrate for units per week with a goal is not met. Patient starting with 15 units. - metFORMIN (GLUCOPHAGE) 1000 MG tablet; Take 1 tablet by mouth daily (with breakfast)  Dispense: 90 tablet;  Refill: 1  - POCT glycosylated hemoglobin (Hb A1C)        Medical Decision Making: moderate complexity

## 2020-08-21 NOTE — PATIENT INSTRUCTIONS
Diabetes: Discontinuing Janumet and Lantus  Starting Metformin 1000 and Soliqua  Titrate soliqua for 140 -160

## 2020-08-22 PROBLEM — R07.9 CHEST PAIN: Status: RESOLVED | Noted: 2020-08-14 | Resolved: 2020-08-22

## 2020-08-22 PROBLEM — R07.81 RIB PAIN ON RIGHT SIDE: Status: RESOLVED | Noted: 2020-07-27 | Resolved: 2020-08-22

## 2020-08-22 PROBLEM — Z90.49 HX OF CHOLECYSTECTOMY: Status: ACTIVE | Noted: 2020-08-22

## 2020-08-22 PROBLEM — M99.08 SOMATIC DYSFUNCTION OF RIB CAGE REGION: Status: RESOLVED | Noted: 2020-07-27 | Resolved: 2020-08-22

## 2020-08-22 PROBLEM — M99.09 SOMATIC DYSFUNCTION OF BACK: Status: RESOLVED | Noted: 2020-07-27 | Resolved: 2020-08-22

## 2020-08-24 ENCOUNTER — TELEPHONE (OUTPATIENT)
Dept: INTERNAL MEDICINE CLINIC | Age: 60
End: 2020-08-24

## 2020-08-24 NOTE — TELEPHONE ENCOUNTER
General Nahomi is not covered by the insurance after doing a prior [place].  He is able to try trulicity, Victoza, or lantus

## 2020-08-25 NOTE — TELEPHONE ENCOUNTER
Called and spoke with  and wife both. Requested patient to continue [place]. Patient also mentioned that he is getting headache which is very unusual for him. At discharge patient was placed on carvedilol.   As the patient advised to check his blood pressure when he get the headache never slow below 100 and stop the medication carvedilol

## 2020-08-26 ENCOUNTER — OFFICE VISIT (OUTPATIENT)
Dept: BARIATRICS/WEIGHT MGMT | Age: 60
End: 2020-08-26

## 2020-08-26 VITALS
WEIGHT: 220.3 LBS | DIASTOLIC BLOOD PRESSURE: 90 MMHG | HEIGHT: 73 IN | TEMPERATURE: 98.3 F | SYSTOLIC BLOOD PRESSURE: 156 MMHG | HEART RATE: 87 BPM | OXYGEN SATURATION: 96 % | BODY MASS INDEX: 29.2 KG/M2

## 2020-08-26 PROBLEM — Z90.49 STATUS POST LAPAROSCOPIC CHOLECYSTECTOMY: Status: ACTIVE | Noted: 2020-08-26

## 2020-08-26 PROCEDURE — 99024 POSTOP FOLLOW-UP VISIT: CPT | Performed by: SURGERY

## 2020-08-26 RX ORDER — OXYCODONE HYDROCHLORIDE AND ACETAMINOPHEN 5; 325 MG/1; MG/1
1-2 TABLET ORAL EVERY 6 HOURS PRN
Qty: 12 TABLET | Refills: 0 | Status: SHIPPED | OUTPATIENT
Start: 2020-08-26 | End: 2020-09-01

## 2020-08-26 RX ORDER — OXYCODONE HYDROCHLORIDE AND ACETAMINOPHEN 5; 325 MG/1; MG/1
1-2 TABLET ORAL EVERY 6 HOURS PRN
Qty: 20 TABLET | Refills: 0 | Status: SHIPPED | OUTPATIENT
Start: 2020-08-26 | End: 2020-09-02

## 2020-08-26 NOTE — PROGRESS NOTES
GENERAL SURGERY OFFICE NOTE    SUBJECTIVE:    Patient presenting today referred from [address] [place] [address],  and No ref. provider found, for   Chief Complaint   Patient presents with    Post-Op Check     1st P/O Lap Zina, 8/16/20        HPI: Jodee Jauregui is a 61 y.o. male presenting in first, follow up 10 days post op Lap Zina. Complaints of cramping. Some pain and tenderness right side and neck. BM: some diarrhea. Procedure: Laparoscopic cholecystectomy    Pathology:   Final Pathologic Diagnosis:   Gallbladder:        Cholelithiasis. Wounds very nicely healing, bruised but no infection. Wounds very nicely healing, no erythema, no induration, no purulent discharge. No constipation, BMs back to normal.    Thoroughly reviewed the patient's medical history, family history, social history and review of systems with the patient today in the office. Please see medical record for pertinent positives. Past Medical History:   Diagnosis Date    Colitis     Diabetes mellitus ([address]     DJD (degenerative joint disease) of lumbar spine 12/21/2006    MRI Lumbar spine    External hemorrhoids     Family history of lung cancer 1/16/2017    Gallstones 12/15/2009    CT thorax    GERD (gastroesophageal reflux disease)     H/O cardiovascular stress test 3/17/14    3/14-WNL. EF 62%. Exercise capacity 12.8 METS.      Hepatitis C     Hilar adenopathy 1/16/2017    Hypertension     Lipoma 3-    submucosal    Multiple lung nodules 1/16/2017    Polyp, sigmoid colon 1/5/2010    hyperplastic    Pulmonary nodules 12/16/2009    followed by Dr Garcia Gilliam Sleep apnea, obstructive 2/2000    nasal CPAP 8cm    Vertigo         Past Surgical History:   Procedure Laterality Date    CHOLECYSTECTOMY, LAPAROSCOPIC N/A 8/16/2020    CHOLECYSTECTOMY LAPAROSCOPIC performed by Antonia Langford MD at [facility]  3-   [facility]  7/12/2013        Social History     Socioeconomic History    Marital status:      Spouse name: Not on file    Number of children: Not on file    Years of education: Not on file    Highest education level: Not on file   Occupational History    Not on file   Social Needs    Financial resource strain: Not on file    Food insecurity     Worry: Not on file     Inability: Not on file    Transportation needs     Medical: Not on file     Non-medical: Not on file   Tobacco Use    Smoking status: Never Smoker    Smokeless tobacco: Never Used   Substance and Sexual Activity    Alcohol use: Yes     Comment: occ    Drug use: No    Sexual activity: Not on file   Lifestyle    Physical activity     Days per week: Not on file     Minutes per session: Not on file    Stress: Not on file   Relationships    Social connections     Talks on phone: Not on file     Gets together: Not on file     Attends Yarsanism service: Not on file     Active member of club or organization: Not on file     Attends meetings of clubs or organizations: Not on file     Relationship status: Not on file    Intimate partner violence     Fear of current or ex partner: Not on file     Emotionally abused: Not on file     Physically abused: Not on file     Forced sexual activity: Not on file   Other Topics Concern    Not on file   Social History Narrative    Not on file        Allergies   Allergen Reactions    Lansoprazole      diarrhea  cramping        The patient has a family history of    Current Outpatient Medications   Medication Sig Dispense Refill    oxyCODONE-acetaminophen (PERCOCET) 5-325 MG per tablet Take 1-2 tablets by mouth every 6 hours as needed for Pain for up to 12 doses.  12 tablet 0    metFORMIN (GLUCOPHAGE) 1000 MG tablet Take 1 tablet by mouth daily (with breakfast) 90 tablet 1    Insulin Glargine-Lixisenatide (SOLIQUA) 100-33 UNT-MCG/ML SOPN Inject 15 Units/oz/day into the skin every morning (before breakfast) 3 pen 1    aspirin 81 MG chewable tablet Take 1 tablet by mouth daily 30 tablet 3    atorvastatin (LIPITOR) 40 MG tablet Take 1 tablet by mouth nightly 30 tablet 3    carvedilol (COREG) 3.125 MG tablet Take 1 tablet by mouth 2 times daily (with meals) 60 tablet 3    sennosides-docusate sodium (SENOKOT-S) 8.6-50 MG tablet Take 2 tablets by mouth 2 times daily for 15 days 60 tablet 0    lisinopril (PRINIVIL;ZESTRIL) 10 MG tablet TAKE 1 TABLET BY MOUTH DAILY 30 tablet 6    diclofenac sodium (VOLTAREN) 1 % GEL Apply 2 g topically 4 times daily 1 Tube 0    omeprazole (PRILOSEC) 40 MG delayed release capsule TAKE ONE CAPSULE BY MOUTH DAILY 30 capsule 11    Insulin Pen Needle (PEN NEEDLES 31GX5/16\") 31G X 8 MM MISC 1 each by Does not apply route daily 100 each 3    methocarbamol (ROBAXIN) 750 MG tablet TAKE 1 TABLET BY MOUTH TWICE A DAY AS NEEDED FOR BACK PAIN 50 tablet 5    sildenafil (VIAGRA) 100 MG tablet Take 1 tablet by mouth as needed (urinary obstruction) 30 tablet 3    meclizine (ANTIVERT) 25 MG tablet Take 1 tablet by mouth 3 times daily as needed for Dizziness or Nausea. 30 tablet 1    Cholecalciferol (VITAMIN D3) 2000 UNITS CAPS Take  by mouth daily.  loratadine (CLARITIN) 10 MG tablet Take 10 mg by mouth as needed. No current facility-administered medications for this visit. Review of Systems      OBJECTIVE:    BP (!) 156/90 (Site: Left Upper Arm, Position: Sitting, Cuff Size: Medium Adult)   Pulse 87   Temp 98.3 °F (36.8 °C) (Infrared)   Ht 6' 1\" (1.854 m)   Wt 220 lb 4.8 oz (99.9 kg)   SpO2 96%   BMI 29.07 kg/m²    Body mass index is 29.07 kg/m². Physical Exam      ASSESSMENT & PLAN:    1. Status post laparoscopic cholecystectomy         Doing very well, pain gone, prn f/u. Patient counseled on the risks, benefits, and alternatives of treatment plan at length while in the office today. Patient states an understanding and willingness to proceed with the plan.       Follow Up:  Return for PRN - As needed for any new symptom. Charles Nash MD, FACS, FICS.     8/26/20

## 2020-09-18 RX ORDER — CYCLOBENZAPRINE HCL 5 MG
5 TABLET ORAL 2 TIMES DAILY PRN
Qty: 60 TABLET | Refills: 0 | Status: SHIPPED | OUTPATIENT
Start: 2020-09-18 | End: 2021-01-06 | Stop reason: SDUPTHER

## 2020-12-07 ENCOUNTER — TELEPHONE (OUTPATIENT)
Dept: INTERNAL MEDICINE CLINIC | Age: 60
End: 2020-12-07

## 2020-12-07 RX ORDER — INSULIN GLARGINE AND LIXISENATIDE 100; 33 U/ML; UG/ML
20 INJECTION, SOLUTION SUBCUTANEOUS DAILY
COMMUNITY
End: 2020-12-30 | Stop reason: SDUPTHER

## 2020-12-30 ENCOUNTER — OFFICE VISIT (OUTPATIENT)
Dept: INTERNAL MEDICINE CLINIC | Age: 60
End: 2020-12-30
Payer: COMMERCIAL

## 2020-12-30 VITALS
HEART RATE: 73 BPM | DIASTOLIC BLOOD PRESSURE: 84 MMHG | SYSTOLIC BLOOD PRESSURE: 138 MMHG | BODY MASS INDEX: 28.5 KG/M2 | OXYGEN SATURATION: 95 % | WEIGHT: 216 LBS | TEMPERATURE: 97.2 F

## 2020-12-30 PROBLEM — M54.9 DORSALGIA: Status: ACTIVE | Noted: 2020-12-30

## 2020-12-30 LAB
A/G RATIO: 2.4 (ref 1.1–2.2)
ALBUMIN SERPL-MCNC: 4.7 G/DL (ref 3.4–5)
ALP BLD-CCNC: 118 U/L (ref 40–129)
ALT SERPL-CCNC: 25 U/L (ref 10–40)
ANION GAP SERPL CALCULATED.3IONS-SCNC: 11 MMOL/L (ref 3–16)
AST SERPL-CCNC: 18 U/L (ref 15–37)
BASOPHILS ABSOLUTE: 0.1 K/UL (ref 0–0.2)
BASOPHILS RELATIVE PERCENT: 0.7 %
BILIRUB SERPL-MCNC: 0.6 MG/DL (ref 0–1)
BUN BLDV-MCNC: 11 MG/DL (ref 7–20)
CALCIUM SERPL-MCNC: 9.4 MG/DL (ref 8.3–10.6)
CHLORIDE BLD-SCNC: 102 MMOL/L (ref 99–110)
CHOLESTEROL, FASTING: 141 MG/DL (ref 0–199)
CHP ED QC CHECK: NORMAL
CO2: 24 MMOL/L (ref 21–32)
CREAT SERPL-MCNC: 0.5 MG/DL (ref 0.8–1.3)
EOSINOPHILS ABSOLUTE: 0.1 K/UL (ref 0–0.6)
EOSINOPHILS RELATIVE PERCENT: 1.3 %
GFR AFRICAN AMERICAN: >60
GFR NON-AFRICAN AMERICAN: >60
GLOBULIN: 2 G/DL
GLUCOSE BLD-MCNC: 252 MG/DL
GLUCOSE BLD-MCNC: 279 MG/DL (ref 70–99)
HCT VFR BLD CALC: 46.7 % (ref 40.5–52.5)
HDLC SERPL-MCNC: 45 MG/DL (ref 40–60)
HEMOGLOBIN: 15.8 G/DL (ref 13.5–17.5)
LDL CHOLESTEROL CALCULATED: 84 MG/DL
LYMPHOCYTES ABSOLUTE: 3.1 K/UL (ref 1–5.1)
LYMPHOCYTES RELATIVE PERCENT: 30.9 %
MCH RBC QN AUTO: 32.2 PG (ref 26–34)
MCHC RBC AUTO-ENTMCNC: 33.8 G/DL (ref 31–36)
MCV RBC AUTO: 95.3 FL (ref 80–100)
MONOCYTES ABSOLUTE: 0.8 K/UL (ref 0–1.3)
MONOCYTES RELATIVE PERCENT: 7.7 %
NEUTROPHILS ABSOLUTE: 6 K/UL (ref 1.7–7.7)
NEUTROPHILS RELATIVE PERCENT: 59.4 %
PDW BLD-RTO: 13 % (ref 12.4–15.4)
PLATELET # BLD: 141 K/UL (ref 135–450)
PMV BLD AUTO: 8.7 FL (ref 5–10.5)
POTASSIUM SERPL-SCNC: 4.6 MMOL/L (ref 3.5–5.1)
PROSTATE SPECIFIC ANTIGEN: 1.39 NG/ML (ref 0–4)
RBC # BLD: 4.9 M/UL (ref 4.2–5.9)
SODIUM BLD-SCNC: 137 MMOL/L (ref 136–145)
TOTAL PROTEIN: 6.7 G/DL (ref 6.4–8.2)
TRIGLYCERIDE, FASTING: 60 MG/DL (ref 0–150)
VLDLC SERPL CALC-MCNC: 12 MG/DL
WBC # BLD: 10.1 K/UL (ref 4–11)

## 2020-12-30 PROCEDURE — 99204 OFFICE O/P NEW MOD 45 MIN: CPT | Performed by: INTERNAL MEDICINE

## 2020-12-30 PROCEDURE — 82962 GLUCOSE BLOOD TEST: CPT | Performed by: INTERNAL MEDICINE

## 2020-12-30 RX ORDER — ACETAMINOPHEN 160 MG
2000 TABLET,DISINTEGRATING ORAL DAILY
Qty: 30 CAPSULE | Refills: 3 | Status: SHIPPED | OUTPATIENT
Start: 2020-12-30 | End: 2022-02-23 | Stop reason: SDUPTHER

## 2020-12-30 RX ORDER — ATORVASTATIN CALCIUM 40 MG/1
40 TABLET, FILM COATED ORAL NIGHTLY
Qty: 30 TABLET | Refills: 3 | Status: SHIPPED | OUTPATIENT
Start: 2020-12-30 | End: 2021-03-04

## 2020-12-30 RX ORDER — METHOCARBAMOL 750 MG/1
750 TABLET, FILM COATED ORAL 2 TIMES DAILY
Qty: 60 TABLET | Refills: 3 | Status: SHIPPED | OUTPATIENT
Start: 2020-12-30 | End: 2021-03-04

## 2020-12-30 RX ORDER — LISINOPRIL 10 MG/1
10 TABLET ORAL DAILY
Qty: 30 TABLET | Refills: 3 | Status: SHIPPED | OUTPATIENT
Start: 2020-12-30 | End: 2021-08-11

## 2020-12-30 RX ORDER — LORATADINE 10 MG/1
10 TABLET ORAL DAILY
Qty: 30 TABLET | Refills: 3 | Status: SHIPPED | OUTPATIENT
Start: 2020-12-30 | End: 2022-02-23 | Stop reason: SDUPTHER

## 2020-12-30 RX ORDER — ASPIRIN 81 MG/1
81 TABLET, CHEWABLE ORAL DAILY
Qty: 30 TABLET | Refills: 3 | Status: SHIPPED | OUTPATIENT
Start: 2020-12-30 | End: 2021-05-18

## 2020-12-30 RX ORDER — OMEPRAZOLE 40 MG/1
CAPSULE, DELAYED RELEASE ORAL
Qty: 30 CAPSULE | Refills: 3 | Status: SHIPPED | OUTPATIENT
Start: 2020-12-30 | End: 2021-08-11

## 2020-12-30 RX ORDER — INSULIN GLARGINE AND LIXISENATIDE 100; 33 U/ML; UG/ML
20 INJECTION, SOLUTION SUBCUTANEOUS DAILY
Qty: 5 PEN | Refills: 3 | Status: SHIPPED | OUTPATIENT
Start: 2020-12-30 | End: 2021-02-12

## 2020-12-30 RX ORDER — MECLIZINE HYDROCHLORIDE 25 MG/1
25 TABLET ORAL 3 TIMES DAILY PRN
Qty: 30 TABLET | Refills: 3 | Status: SHIPPED | OUTPATIENT
Start: 2020-12-30 | End: 2021-03-04

## 2020-12-30 SDOH — ECONOMIC STABILITY: FOOD INSECURITY: WITHIN THE PAST 12 MONTHS, THE FOOD YOU BOUGHT JUST DIDN'T LAST AND YOU DIDN'T HAVE MONEY TO GET MORE.: NEVER TRUE

## 2020-12-30 SDOH — ECONOMIC STABILITY: FOOD INSECURITY: WITHIN THE PAST 12 MONTHS, YOU WORRIED THAT YOUR FOOD WOULD RUN OUT BEFORE YOU GOT MONEY TO BUY MORE.: NEVER TRUE

## 2020-12-30 SDOH — ECONOMIC STABILITY: TRANSPORTATION INSECURITY
IN THE PAST 12 MONTHS, HAS THE LACK OF TRANSPORTATION KEPT YOU FROM MEDICAL APPOINTMENTS OR FROM GETTING MEDICATIONS?: NOT ASKED

## 2020-12-30 SDOH — ECONOMIC STABILITY: INCOME INSECURITY: HOW HARD IS IT FOR YOU TO PAY FOR THE VERY BASICS LIKE FOOD, HOUSING, MEDICAL CARE, AND HEATING?: HARD

## 2020-12-30 SDOH — ECONOMIC STABILITY: TRANSPORTATION INSECURITY
IN THE PAST 12 MONTHS, HAS LACK OF TRANSPORTATION KEPT YOU FROM MEETINGS, WORK, OR FROM GETTING THINGS NEEDED FOR DAILY LIVING?: NOT ASKED

## 2020-12-30 NOTE — PROGRESS NOTES
Name: Allegra Iniguez  [id number]  Age: 61 y.o. YOB: 1960  Sex: male    CHIEF COMPLAINT:    Chief Complaint   Patient presents with   Celestina Fairbanks Patient     Dr. Scott Lowry Diabetes    Hypertension       HISTORY OF PRESENT ILLNESS:     This is a pleasant  61 y.o. male  is seen today to establish care and for management of chronic medical problems and medications refills. Previous records reviewed . Doing OK . Denies CP or SOB. He was in the hospital in August 2020 with chest pain and abdominal pain. MI was ruled out and he was found to have gallstone and had laparoscopic cholecystectomy. No more chest pain since then. No fever , sore throat or cough or congestion. He has nodules in his lung since 2010 but during last hospitalization CAT scan of the chest showed some increase in size of the nodules and probably new nodules. He has not been followed by his pulmonologist, Dr. Soila Pugh  for several years. Denies any abdominal pain. Appetite OK. Bowels moving [id number] [address] Complains of nocturia 2-3 times and it disturbs his sleep  Complains of chronic low back pain. Hearing is ok. Vision Ok with glasses. Denies  any significant skin lesions. Denies any significant depression or anxiety. He claims he does not check his sugars often and has not been checking his sugars recently. No other complaints.       Past Medical History:    Patient Active Problem List   Diagnosis    Essential hypertension    Hepatitis C    Colitis    Mixed hyperlipidemia    Vitamin D deficiency    Type 2 diabetes mellitus without complication ([facility] [address]    Multiple lung nodules    Hilar adenopathy    Family history of lung cancer    Peyronie disease    Gastroesophageal reflux disease without esophagitis    Hx of cholecystectomy    Status post laparoscopic cholecystectomy    Dorsalgia        Past Surgical History:        Procedure Laterality Date    CHOLECYSTECTOMY, LAPAROSCOPIC N/A 8/16/2020 sildenafil (VIAGRA) 100 MG tablet Take 1 tablet by mouth as needed (urinary obstruction) 7/6/17  Yes Mariel Jolly MD       LAB DATA: Reviewed. REVIEW OF SYSTEMS:   see HPI/ Comprehensive review of systems negative except for the ones mentioned in HPI. PHYSICAL EXAMINATION:   /84   Pulse 73   Temp 97.2 °F (36.2 °C)   Wt 216 lb (98 kg)   SpO2 95%   BMI 28.50 kg/m²      GENERAL APPEARANCE:    Alert, oriented x 3, well developed, cooperative, not in any distress, appears stated age. HEAD:   Normocephalic, atraumatic   EYES:   PERRLA, EOMI, lids normal, conjuctivea clear, sclera anicteric. NECK:    Supple, symmetrical,  trachea midline, no thyromegaly, no JVD, no lymphadenopathy. LUNGS:    Clear to auscultation bilaterally, respirations unlabored, accessory muscles are not used. HEART:     Regular rate and rhythm, S1 and S2 normal, no murmur, rub or gallop. PMI in MCL. ABDOMEN:    Soft, non-tender, bowel sounds are normoactive, no masses, no hepatospleenomegaly. EXTREMITY:   no bipedal edema  NEURO:  Alert, oriented to person, place and time. Grossly intact. Musculoskeletal:         No kyphosis or scoliosis, mild tenderness lower back. Skin:                            Warm and dry. No rash or obvious suspicious lesions. PSYCH:  Mood euthymic, insight and judgement good. ASSESSMENT/PLAN:    Controlled type 2 diabetes mellitus with hyperglycemia, with long-term current use of insulin ([address] Advised low sugar  and exercise . Advised to take medications regularly. Medications reviewed. Patient denies side effects with medications. Advised to check his sugars often. Also advised to see an optometrist or ophthalmologist for diabetic eye exam soon. -     metFORMIN (GLUCOPHAGE) 1000 MG tablet;  Take 1 tablet by mouth daily (with breakfast)  -     Insulin Glargine-Lixisenatide (SOLIQUA) 100-33 UNT-MCG/ML SOPN; Inject 20 Units into the skin daily  -     Hemoglobin A1C Essential hypertension. Continue current medications, denies side effect with medicationss. Low salt diet and exercise advised. -     lisinopril (PRINIVIL;ZESTRIL) 10 MG tablet; Take 1 tablet by mouth daily  -     CBC Auto Differential  -     Comprehensive Metabolic Panel    Mixed hyperlipidemia. Patient is taking cholesterol medications regularly. Denies any side effects. Diet and exercise advised. -     atorvastatin (LIPITOR) 40 MG tablet; Take 1 tablet by mouth nightly  -     Lipid, Fasting    Gastroesophageal reflux disease without esophagitis  -     omeprazole (PRILOSEC) 40 MG delayed release capsule; TAKE ONE CAPSULE BY MOUTH DAILY    Vitamin D deficiency  -     Cholecalciferol (VITAMIN D3) 50 MCG (2000 UT) CAPS; Take 1 capsule by mouth daily    Dorsalgia. Tylenol as needed. -     methocarbamol (ROBAXIN) 750 MG tablet; Take 1 tablet by mouth 2 times daily    Allergic rhinitis, unspecified seasonality, unspecified trigger  -     loratadine (CLARITIN) 10 MG tablet; Take 1 tablet by mouth daily    Nocturia. Consider Flomax. -     Psa screening    Pulmonary nodules/lesions, multiple. Consult pulmonologist.  -     Jaxon Napoles MD, Pulmonology, [place]    Vertigo  -     meclizine (ANTIVERT) 25 MG tablet; Take 1 tablet by mouth 3 times daily as needed for Dizziness or Nausea    Other orders  -     POCT Glucose  -     aspirin 81 MG chewable tablet; Take 1 tablet by mouth daily    I have recommended that the patient follow CDC guidelines for prevention of COVID-19 infection. I also discussed Coronavirus precaution including wearing face mask, handwashing practice, wiping items touched in public such as gas pumps, door handles, shopping carts, etc. Also Self monitoring for infection - fever, chills, cough, SOB. Should he/she develop symptoms he/she should call office or go to ER for further instructions. Care discussed with patient. Questions answered and patient verbalizes understanding and agrees with plan. Medications reviewed and reconciled. Continue current medications. Appropriate prescriptions are ordered. Risks and benefits of meds are discussed. After visit summary provided. Advised to call for any problems, questions, or concerns. If symptoms worsen or don't improve as expected, to call us or go to ER. Follow up as directed, sooner if needed. Return in about 3 months (around 3/30/2021). This dictation was performed with a verbal recognition program and it was checked for errors. It is possible that there are still dictated errors within this office note. Any errors should be brought immediately to my attention for correction. All efforts were made to ensure that this office note is accurate.      Mary Duron MD

## 2020-12-31 LAB
ESTIMATED AVERAGE GLUCOSE: 266.1 MG/DL
HBA1C MFR BLD: 10.9 %

## 2021-01-06 RX ORDER — CYCLOBENZAPRINE HCL 5 MG
5 TABLET ORAL 2 TIMES DAILY PRN
Qty: 60 TABLET | Refills: 0 | Status: SHIPPED | OUTPATIENT
Start: 2021-01-06 | End: 2021-02-09

## 2021-02-09 ENCOUNTER — INITIAL CONSULT (OUTPATIENT)
Dept: PULMONOLOGY | Age: 61
End: 2021-02-09
Payer: COMMERCIAL

## 2021-02-09 VITALS
WEIGHT: 220 LBS | BODY MASS INDEX: 29.16 KG/M2 | OXYGEN SATURATION: 97 % | SYSTOLIC BLOOD PRESSURE: 104 MMHG | HEART RATE: 87 BPM | DIASTOLIC BLOOD PRESSURE: 68 MMHG | HEIGHT: 73 IN

## 2021-02-09 DIAGNOSIS — R91.8 MULTIPLE LUNG NODULES: ICD-10-CM

## 2021-02-09 DIAGNOSIS — R91.8 MULTIPLE LUNG NODULES: Primary | ICD-10-CM

## 2021-02-09 DIAGNOSIS — Z80.1 FAMILY HISTORY OF LUNG CANCER: ICD-10-CM

## 2021-02-09 DIAGNOSIS — R06.09 DYSPNEA ON EXERTION: ICD-10-CM

## 2021-02-09 DIAGNOSIS — R59.0 HILAR ADENOPATHY: ICD-10-CM

## 2021-02-09 LAB
EXPIRATORY TIME-POST: NORMAL
EXPIRATORY TIME: NORMAL
FEF 25-75% %CHNG: NORMAL
FEF 25-75% %PRED-POST: NORMAL
FEF 25-75% %PRED-PRE: NORMAL
FEF 25-75% PRED: NORMAL
FEF 25-75%-POST: NORMAL
FEF 25-75%-PRE: NORMAL
FEV1 %PRED-POST: 99.5 %
FEV1 %PRED-PRE: 89.6 %
FEV1 PRED: 4 L
FEV1-POST: 3.98 L
FEV1-PRE: 3.58 L
FEV1/FVC %PRED-POST: 100.8 %
FEV1/FVC %PRED-PRE: 101.5 %
FEV1/FVC PRED: 75.6 %
FEV1/FVC-POST: 76.2 %
FEV1/FVC-PRE: 76.7 %
FVC %PRED-POST: 98.8 L
FVC %PRED-PRE: 88.4 %
FVC PRED: 5.29 L
FVC-POST: 5.22 L
FVC-PRE: 4.67 L
PEF %PRED-POST: NORMAL
PEF %PRED-PRE: NORMAL
PEF PRED: NORMAL
PEF%CHNG: NORMAL
PEF-POST: NORMAL
PEF-PRE: NORMAL

## 2021-02-09 PROCEDURE — 99204 OFFICE O/P NEW MOD 45 MIN: CPT | Performed by: INTERNAL MEDICINE

## 2021-02-09 RX ORDER — CYCLOBENZAPRINE HCL 5 MG
5 TABLET ORAL 2 TIMES DAILY PRN
Qty: 60 TABLET | Refills: 10 | Status: SHIPPED | OUTPATIENT
Start: 2021-02-09 | End: 2021-03-04

## 2021-02-09 RX ORDER — ALBUTEROL SULFATE 90 UG/1
2 AEROSOL, METERED RESPIRATORY (INHALATION) EVERY 6 HOURS PRN
Qty: 1 INHALER | Refills: 11 | Status: SHIPPED | OUTPATIENT
Start: 2021-02-09 | End: 2021-03-31

## 2021-02-09 ASSESSMENT — PULMONARY FUNCTION TESTS
FEV1_POST: 3.98
FEV1_PRE: 3.58
FEV1/FVC_PRE: 76.7
FEV1/FVC_POST: 76.2
FEV1_PERCENT_PREDICTED_POST: 99.5
FVC_PERCENT_PREDICTED_POST: 98.8
FEV1/FVC_PERCENT_PREDICTED_PRE: 101.5
FEV1/FVC_PERCENT_PREDICTED_POST: 100.8
FVC_POST: 5.22
FEV1_PERCENT_PREDICTED_PRE: 89.6
FVC_PREDICTED: 5.29
FEV1_PREDICTED: 4.00
FVC_PERCENT_PREDICTED_PRE: 88.4
FVC_PRE: 4.67
FEV1/FVC_PREDICTED: 75.6

## 2021-02-09 NOTE — PROGRESS NOTES
Subjective:   CHIEF COMPLAINT / HPI: Multiple lung nodules on CT, hilar adenopathy, dyspnea exertion, minimal COPD, family history lung cancer  Erwin Mckeon has been referred here to follow-up on his multiple pulmonary nodules which have been noted in the past.  I last saw him in 2017 and recommended a repeat CT chest but he opted not to get that done and was lost to follow-up. He states that over the past several months he has noted some mild dyspnea exertion. He denies recurrent episodes of bronchitis. He had an episode this past summer where he had lightheadedness and syncope and was later diagnosed with gallbladder disease and required a cholecystectomy. CT of the chest at that time revealed bilateral pulmonary nodules many of which were calcified but several nodules appear to be increasing in size and did have some areas of cavitation. He has always been a non-smoker but states that his father smoked and he had significant secondary smoke exposure as a child. He denies a history of asthma. He has had no fever associate with cough and denies any COVID-19 exposure. He does carry a past history obstructive sleep apnea but is not on CPAP. He does complain of mild daytime sleepiness. Office spirometry demonstrates an FEV1 of 3.58 L with an FVC of 4.67 L. There is no significant airways obstruction. Following bronchodilators he did have a significant response  Past Medical History:  Past Medical History:   Diagnosis Date    Colitis     Diabetes mellitus ([address]     DJD (degenerative joint disease) of lumbar spine 12/21/2006    MRI Lumbar spine    Dyspnea on exertion 2/9/2021    External hemorrhoids     Family history of lung cancer 1/16/2017    Gallstones 12/15/2009    CT thorax    GERD (gastroesophageal reflux disease)     H/O cardiovascular stress test 3/17/14    3/14-WNL. EF 62%. Exercise capacity 12.8 METS.      Hepatitis C     Hilar adenopathy 1/16/2017    Hypertension     Lipoma 3- submucosal    Multiple lung nodules 1/16/2017    Polyp, sigmoid colon 1/5/2010    hyperplastic    Pulmonary nodules 12/16/2009    followed by Dr Tad Velazquez Sleep apnea, obstructive 2/2000    nasal CPAP 8cm    Vertigo        Current Medications:    No current facility-administered medications for this visit. Allergies   Allergen Reactions    Lansoprazole      diarrhea  cramping       Social History:    Social History     Socioeconomic History    Marital status:      Spouse name: None    Number of children: None    Years of education: None    Highest education level: None   Occupational History    None   Social Needs    Financial resource strain: Hard    Food insecurity     Worry: Never true     Inability: Never true    Transportation needs     Medical: None     Non-medical: None   Tobacco Use    Smoking status: Never Smoker    Smokeless tobacco: Never Used   Substance and Sexual Activity    Alcohol use: Yes     Comment: occ    Drug use: No    Sexual activity: None   Lifestyle    Physical activity     Days per week: None     Minutes per session: None    Stress: None   Relationships    Social connections     Talks on phone: None     Gets together: None     Attends [employer] service: None     Active member of club or organization: None     Attends meetings of clubs or organizations: None     Relationship status: None    Intimate partner violence     Fear of current or ex partner: None     Emotionally abused: None     Physically abused: None     Forced sexual activity: None   Other Topics Concern    None   Social History Narrative    None       Family History:    Family History   Problem Relation Age of Onset    No Known Problems Mother     No Known Problems Father          REVIEW OF SYSTEMS:    CONSTITUTIONAL:  negative for fevers, chills, diaphoresis,appetite change, night sweats and unexpected weight change. HEENT:  negative for hearing loss,  sinus pressure, nasal congestion, epistaxis   RESPIRATORY:  See HPI  CARDIOVASCULAR:  negative for chest pain, palpitations, exertional chest pressure/discomfort, edema  GASTROINTESTINAL: negative for nausea, vomiting, diarrhea, constipation, blood in stool and abdominal pain  GENITOURINARY: Increased frequency of urination at nighttime, no UTI, negative for flank pain  HEMATOLOGIC/LYMPHATIC:  negative for easy bruising, bleeding and lymphadenopathy  ALLERGIC/IMMUNOLOGIC:  negative for recurrent infections, angioedema, anaphylaxis and drug reaction  MUSCULOSKELETAL:  negative for  pain, joint swelling, decreased range of motion and muscle weakness  NEURO: negative for chronic headaches,seizures,TIA,CVA  SKIN: negative for rash,pruritis    Objective:   PHYSICAL EXAM:      VITALS:    Vitals:    02/09/21 1015   BP: 104/68   Pulse: 87   SpO2: 97%   Weight: 220 lb (99.8 kg)   Height: 6' 1\" (1.854 m)         CONSTITUTIONAL:  awake, alert, cooperative, no apparent distress, and appears stated age  HEENT:  supple and nontender,  trachea midline, no adenopathy, thyroid nl, no JVD, no wheezing or stridor over neck  CHEST: chest expansion equal and symmetrical, no intercostal retraction, no increased work of breathing  LUNGS: Breath sounds are clear throughout all areas without wheezing or rhonchi and there is no pleural rubs  CARDIOVASCULAR: normal S1 and S2 , no murmurs or gallops ,no pericardial rubs  ABDOMEN:  normal bowel sounds, non-distended and no masses palpated, and no tenderness to palpation. No hepatosplenomegaly  LYMPHADENOPATHY:  no axillary or supraclavicular adenopathy. No cervical adenopathy  EXTREMITIES: no edema, no inflammation, no tenderness. NEURO: oriented X 3, No focal deficits  SKIN: no rashes, No lesions    RADIOLOGY: CTA chest 8/14/2020  1. Normal caliber aorta without acute aortic pathology. 2. Multiple pulmonary nodules bilaterally some of which are new since 2015 or   2010 or have increased in size.  Some of these are partially cavitary in   appearance.  However, there has been interval development of calcification   involving majority of these nodules favoring a benign process and healed   infectious or inflammatory process.  Recommend correlation with any history   of malignancy.  Otherwise these can be followed per clinical protocol or in   3-6 months. 3. Dependent atelectasis with no other acute cardiopulmonary findings. Chest x-ray 8/14/2020  No acute cardiopulmonary findings.       12 mm left pulmonary nodule slightly decreased in size when compared to the   prior exam.  2nd pulmonary nodule in the left lung is not well seen on this   study. PFT: Office spirometry demonstrates an FEV1 of 3.58 L with an FVC of 4.67 L. There is no significant airways obstruction. Following bronchodilators he did have a significant response    Assessment:     1. Multiple lung nodules    2. Hilar adenopathy    3. Dyspnea on exertion    4. Family history of lung cancer        Plan:   I would like to repeat his CT chest to follow-up on the several nodules which have increased in size. No other intervention is indicated at this time. If the nodules have significantly increased further I would consider a PET/CT. I suspect his dyspnea on exertion is multifactorial and he does not appear to have significant obstructive lung disease at this time. However he had a significant response to bronchodilators during PFT testing and he may have an asthmatic tendency. Because that I am going to arrange for him to have an albuterol rescue inhaler to use as needed.

## 2021-02-12 RX ORDER — EMPAGLIFLOZIN 10 MG/1
10 TABLET, FILM COATED ORAL DAILY
Qty: 30 TABLET | Refills: 3 | Status: SHIPPED | OUTPATIENT
Start: 2021-02-12 | End: 2021-03-31 | Stop reason: DRUGHIGH

## 2021-02-12 RX ORDER — EMPAGLIFLOZIN 10 MG/1
10 TABLET, FILM COATED ORAL DAILY
COMMUNITY
End: 2021-02-12 | Stop reason: SDUPTHER

## 2021-02-12 RX ORDER — INSULIN GLARGINE 100 [IU]/ML
40 INJECTION, SOLUTION SUBCUTANEOUS NIGHTLY
COMMUNITY
End: 2021-02-12

## 2021-02-12 RX ORDER — INSULIN GLARGINE 100 [IU]/ML
40 INJECTION, SOLUTION SUBCUTANEOUS NIGHTLY
COMMUNITY
End: 2021-02-12 | Stop reason: SDUPTHER

## 2021-02-12 RX ORDER — INSULIN GLARGINE 100 [IU]/ML
40 INJECTION, SOLUTION SUBCUTANEOUS NIGHTLY
Qty: 5 PEN | Refills: 3 | Status: SHIPPED | OUTPATIENT
Start: 2021-02-12 | End: 2021-03-31 | Stop reason: SDUPTHER

## 2021-03-01 ENCOUNTER — TELEPHONE (OUTPATIENT)
Dept: PULMONOLOGY | Age: 61
End: 2021-03-01

## 2021-03-01 RX ORDER — PEN NEEDLE, DIABETIC 32GX 5/32"
NEEDLE, DISPOSABLE MISCELLANEOUS
Qty: 100 EACH | Refills: 3 | Status: SHIPPED | OUTPATIENT
Start: 2021-03-01 | End: 2022-04-19

## 2021-03-03 ENCOUNTER — OFFICE VISIT (OUTPATIENT)
Dept: INTERNAL MEDICINE CLINIC | Age: 61
End: 2021-03-03
Payer: COMMERCIAL

## 2021-03-03 VITALS
TEMPERATURE: 96.6 F | HEART RATE: 84 BPM | DIASTOLIC BLOOD PRESSURE: 84 MMHG | OXYGEN SATURATION: 96 % | WEIGHT: 219 LBS | BODY MASS INDEX: 29.03 KG/M2 | HEIGHT: 73 IN | SYSTOLIC BLOOD PRESSURE: 140 MMHG

## 2021-03-03 DIAGNOSIS — I10 ESSENTIAL HYPERTENSION: ICD-10-CM

## 2021-03-03 DIAGNOSIS — J30.9 ALLERGIC RHINITIS, UNSPECIFIED SEASONALITY, UNSPECIFIED TRIGGER: ICD-10-CM

## 2021-03-03 DIAGNOSIS — E55.9 VITAMIN D DEFICIENCY: ICD-10-CM

## 2021-03-03 DIAGNOSIS — Z79.4 CONTROLLED TYPE 2 DIABETES MELLITUS WITH HYPERGLYCEMIA, WITH LONG-TERM CURRENT USE OF INSULIN (HCC): ICD-10-CM

## 2021-03-03 DIAGNOSIS — R35.1 NOCTURIA: ICD-10-CM

## 2021-03-03 DIAGNOSIS — R91.8 PULMONARY NODULES: ICD-10-CM

## 2021-03-03 DIAGNOSIS — K58.0 IRRITABLE BOWEL SYNDROME WITH DIARRHEA: ICD-10-CM

## 2021-03-03 DIAGNOSIS — R07.9 CHEST PAIN, UNSPECIFIED TYPE: Primary | ICD-10-CM

## 2021-03-03 DIAGNOSIS — E11.65 CONTROLLED TYPE 2 DIABETES MELLITUS WITH HYPERGLYCEMIA, WITH LONG-TERM CURRENT USE OF INSULIN (HCC): ICD-10-CM

## 2021-03-03 DIAGNOSIS — E78.2 MIXED HYPERLIPIDEMIA: ICD-10-CM

## 2021-03-03 LAB
CHP ED QC CHECK: NORMAL
GLUCOSE BLD-MCNC: 178 MG/DL

## 2021-03-03 PROCEDURE — 82962 GLUCOSE BLOOD TEST: CPT | Performed by: INTERNAL MEDICINE

## 2021-03-03 PROCEDURE — 99214 OFFICE O/P EST MOD 30 MIN: CPT | Performed by: INTERNAL MEDICINE

## 2021-03-03 PROCEDURE — 93000 ELECTROCARDIOGRAM COMPLETE: CPT | Performed by: INTERNAL MEDICINE

## 2021-03-03 RX ORDER — NITROGLYCERIN 0.4 MG/1
0.4 TABLET SUBLINGUAL EVERY 5 MIN PRN
Qty: 25 TABLET | Refills: 3 | Status: SHIPPED | OUTPATIENT
Start: 2021-03-03 | End: 2022-09-29 | Stop reason: SDUPTHER

## 2021-03-03 RX ORDER — DICYCLOMINE HYDROCHLORIDE 10 MG/1
10 CAPSULE ORAL 4 TIMES DAILY
Qty: 120 CAPSULE | Refills: 5 | Status: SHIPPED | OUTPATIENT
Start: 2021-03-03 | End: 2022-05-16

## 2021-03-03 ASSESSMENT — PATIENT HEALTH QUESTIONNAIRE - PHQ9
2. FEELING DOWN, DEPRESSED OR HOPELESS: 0
1. LITTLE INTEREST OR PLEASURE IN DOING THINGS: 0
SUM OF ALL RESPONSES TO PHQ9 QUESTIONS 1 & 2: 0
SUM OF ALL RESPONSES TO PHQ QUESTIONS 1-9: 0
SUM OF ALL RESPONSES TO PHQ QUESTIONS 1-9: 0

## 2021-03-03 NOTE — PROGRESS NOTES
Name: Gera Easley  [id number]  Age: 61 y.o. YOB: 1960  Sex: male    CHIEF COMPLAINT:    Chief Complaint   Patient presents with    Diabetes    Chest Pain       HISTORY OF PRESENT ILLNESS:     This is a pleasant  61 y.o. male  is seen today chest pains iron for management of chronic medical problems and medications refills. Previous records reviewed . C/O CP, substernal on and off. No relation with exertion or rest.  No diaphoresis or significant SOB. He did see Dr. Deny Boudreaux and he recommended a cardiac consult. Followed nodules in the chest he was considering ordering a CT chest but apparently it was not done. He wants to see Dr. Adri Gandara for chest pain. No fever , sore throat or cough or congestion. Denies any abdominal pain. Appetite OK. Bowels moving [id number] [place] is better. Denies any significant arthritis. Has chronic back pain but is better. Hearing is ok. Vision Ok with glasses. Denies  any significant skin lesions. Denies any significant depression or anxiety. Sugars still stays @ 170-200 mostly. He has a poorly controlled diabetes and his A1c was 10.9. But his sugars are improving. No other complaints.         Past Medical History:    Patient Active Problem List   Diagnosis    Essential hypertension    Hepatitis C    Colitis    Mixed hyperlipidemia    Vitamin D deficiency    Type 2 diabetes mellitus without complication ([address]    Pulmonary nodules    Hilar adenopathy    Family history of lung cancer    Peyronie disease    Gastroesophageal reflux disease without esophagitis    Chest pain    Hx of cholecystectomy    Status post laparoscopic cholecystectomy    Dorsalgia    Dyspnea on exertion    Controlled type 2 diabetes mellitus with hyperglycemia, with long-term current use of insulin (HCC)    Nocturia    Allergic rhinitis    Irritable bowel syndrome with diarrhea        Past Surgical History:        Procedure Laterality Date    CHOLECYSTECTOMY, LAPAROSCOPIC N/A 8/16/2020    CHOLECYSTECTOMY LAPAROSCOPIC performed by Latosha Ngo MD at [facility]  3-    LAPAROSCOPIC APPENDECTOMY  7/12/2013       Social History:   Social History     Tobacco Use    Smoking status: Never Smoker    Smokeless tobacco: Never Used   Substance Use Topics    Alcohol use: Yes     Comment: occ       Family History:       Problem Relation Age of Onset    No Known Problems Mother     No Known Problems Father        Allergies:  Lansoprazole    Current Medications :      Prior to Admission medications    Medication Sig Start Date End Date Taking? Authorizing Provider   nitroGLYCERIN (NITROSTAT) 0.4 MG SL tablet Place 1 tablet under the tongue every 5 minutes as needed for Chest pain up to max of 3 total doses.  If no relief after 1 dose, call 911. 3/3/21  Yes Omero Clark MD   dicyclomine (BENTYL) 10 MG capsule Take 1 capsule by mouth 4 times daily 3/3/21  Yes Omero Clark MD   BD PEN NEEDLE CLARENCE U/F 32G X 4 MM MISC USE 1 PEN NEEDLE DAILY AS DIRECTED 3/1/21  Yes Omero Clark MD   empagliflozin (JARDIANCE) 10 MG tablet Take 1 tablet by mouth daily 2/12/21  Yes Omero Clark MD   insulin glargine (LANTUS SOLOSTAR) 100 UNIT/ML injection pen Inject 40 Units into the skin nightly 2/12/21  Yes Omero Clark MD   cyclobenzaprine (FLEXERIL) 5 MG tablet TAKE 1 TABLET BY MOUTH 2 TIMES DAILY AS NEEDED FOR MUSCLE SPASMS 2/9/21 3/11/21 Yes Omero Clark MD   metFORMIN (GLUCOPHAGE) 1000 MG tablet Take 1 tablet by mouth daily (with breakfast) 12/30/20  Yes Omero Clark MD   omeprazole (PRILOSEC) 40 MG delayed release capsule TAKE ONE CAPSULE BY MOUTH DAILY 12/30/20  Yes Omero Clark MD   atorvastatin (LIPITOR) 40 MG tablet Take 1 tablet by mouth nightly 12/30/20  Yes Omero Clark MD   lisinopril (PRINIVIL;ZESTRIL) 10 MG tablet Take 1 tablet by mouth daily 12/30/20  Yes Omero Clark MD   methocarbamol (ROBAXIN) 750 MG tablet Take 1 tablet by mouth 2 times daily 12/30/20  Yes Leonides Short MD   aspirin 81 MG chewable tablet Take 1 tablet by mouth daily 12/30/20  Yes Leonides Short MD   meclizine (ANTIVERT) 25 MG tablet Take 1 tablet by mouth 3 times daily as needed for Dizziness or Nausea 12/30/20  Yes Leonides Short MD   Cholecalciferol (VITAMIN D3) 50 MCG (2000 UT) CAPS Take 1 capsule by mouth daily 12/30/20  Yes Leonides Short MD   loratadine (CLARITIN) 10 MG tablet Take 1 tablet by mouth daily 12/30/20  Yes Leonides Short MD   sildenafil (VIAGRA) 100 MG tablet Take 1 tablet by mouth as needed (urinary obstruction) 7/6/17  Yes Sebastian Landrum MD   albuterol sulfate HFA (PROAIR HFA) 108 (90 Base) MCG/ACT inhaler Inhale 2 puffs into the lungs every 6 hours as needed for Wheezing  Patient not taking: Reported on 3/3/2021 2/9/21   Jing Love MD       LAB DATA: Reviewed. REVIEW OF SYSTEMS:   see HPI/ Comprehensive review of systems negative except for the ones mentioned in HPI. PHYSICAL EXAMINATION:   BP (!) 140/84 (Site: Left Upper Arm, Position: Sitting, Cuff Size: Medium Adult)   Pulse 84   Temp 96.6 °F (35.9 °C)   Ht 6' 1\" (1.854 m)   Wt 219 lb (99.3 kg)   SpO2 96%   BMI 28.89 kg/m²      GENERAL APPEARANCE:    Alert, oriented x 3, well developed, cooperative, not in any distress, appears stated age. HEAD:   Normocephalic, atraumatic   EYES:   PERRLA, EOMI, lids normal, conjuctivea clear, sclera anicteric. NECK:    Supple, symmetrical,  trachea midline, no thyromegaly, no JVD, no lymphadenopathy. LUNGS:    Clear to auscultation bilaterally, respirations unlabored, accessory muscles are not used. HEART:     Regular rate and rhythm, S1 and S2 normal, no murmur, rub or gallop. PMI in MCL. ABDOMEN:    Soft, non-tender, bowel sounds are normoactive, no masses, no hepatospleenomegaly. EXTREMITY:   no bipedal edema  NEURO:  Alert, oriented to person, place and time. Grossly intact.   Musculoskeletal:         No kyphosis or scoliosis, mild tenderness in he/she develop symptoms he/she should call office or go to ER for further instructions. Care discussed with patient. Questions answered and patient verbalizes understanding and agrees with plan. Medications reviewed and reconciled. Continue current medications. Appropriate prescriptions are ordered. Risks and benefits of meds are discussed. After visit summary provided. Advised to call for any problems, questions, or concerns. If symptoms worsen or don't improve as expected, to call us or go to ER. Follow up as directed, sooner if needed. Return in about 4 weeks (around 3/31/2021). This dictation was performed with a verbal recognition program and it was checked for errors. It is possible that there are still dictated errors within this office note. Any errors should be brought immediately to my attention for correction. All efforts were made to ensure that this office note is accurate.      Wanda Mackey MD

## 2021-03-04 ENCOUNTER — OFFICE VISIT (OUTPATIENT)
Dept: CARDIOLOGY CLINIC | Age: 61
End: 2021-03-04
Payer: COMMERCIAL

## 2021-03-04 VITALS
BODY MASS INDEX: 29.16 KG/M2 | HEIGHT: 73 IN | DIASTOLIC BLOOD PRESSURE: 98 MMHG | SYSTOLIC BLOOD PRESSURE: 146 MMHG | HEART RATE: 82 BPM | WEIGHT: 220 LBS

## 2021-03-04 DIAGNOSIS — I10 ESSENTIAL HYPERTENSION: ICD-10-CM

## 2021-03-04 DIAGNOSIS — E11.9 TYPE 2 DIABETES MELLITUS WITHOUT COMPLICATION, WITHOUT LONG-TERM CURRENT USE OF INSULIN (HCC): ICD-10-CM

## 2021-03-04 DIAGNOSIS — K21.9 GASTROESOPHAGEAL REFLUX DISEASE WITHOUT ESOPHAGITIS: ICD-10-CM

## 2021-03-04 DIAGNOSIS — R07.9 CHEST PAIN, UNSPECIFIED TYPE: Primary | ICD-10-CM

## 2021-03-04 DIAGNOSIS — E78.2 MIXED HYPERLIPIDEMIA: ICD-10-CM

## 2021-03-04 DIAGNOSIS — R91.8 PULMONARY NODULES: ICD-10-CM

## 2021-03-04 PROBLEM — R07.2 PRECORDIAL PAIN: Status: ACTIVE | Noted: 2020-08-14

## 2021-03-04 PROCEDURE — 99214 OFFICE O/P EST MOD 30 MIN: CPT | Performed by: INTERNAL MEDICINE

## 2021-03-04 PROCEDURE — 93000 ELECTROCARDIOGRAM COMPLETE: CPT | Performed by: INTERNAL MEDICINE

## 2021-03-04 NOTE — PATIENT INSTRUCTIONS
?CAD:Multiple risk factors, including Diabetes. Having chest pain symptoms. Apparently NTG did help chest pain  HTN:Yes  Not well controlled needs medication adjustment. - changes in  treatment:   no   CARDIOMYOPATHY:No  CONGESTIVE HEART FAILURE:No     VHD:no   No significant VHD noted  DYSLIPIDEMIA: yes,   Patient's profile is at / near [place],   Does not tolerate medications well due to side effects  See most recent Lab values in Labs section above. Diabetes mellitis:yes,   BS under good control no  Hgb A1c avilable yes,   CVI:yes, ? Patient has symptomatic C4 disease  OTHER RELEVANT DIAGNOSIS:as noted in patient's active problem list:  TESTS ORDERED:   Echocardiogram,  Lexiscan Cardiolite ( Patient says he cannot walk on treadmill ), Lower extremity venus US,   All previously ordered tests reviewed. MEDICATIONS: CPM + Metoprolol 25 mg BID & Iipitor 40 mg qhs. Office f/u for test results with .

## 2021-03-04 NOTE — PROGRESS NOTES
OFFICE PROGRESS NOTES      Chuy Cadena is a 61 y.o. male who has    CHIEF COMPLAINT AS FOLLOWS:  CHEST PAIN: Patient C/O chest pain. 1. When did it began: one week. intermittent  2. How long does it last:10 min at a time  3. How severe: 6/10  4. Radiation:no  5. Aggravating factors: Albuterol breathing treatment. 6. Relieving factors:none  7. Associated features:no  8. Tenderness to palpation:no       SOB: No C/O SOB at this time. LEG EDEMA: No leg edema   PALPITATIONS: Denies any C/O Palpitations   DIZZINESS: No C/O Dizziness   SYNCOPE: None   OTHER: Prominent left leg veins                                    HPI: Patient is here for F/U on his Chest pain, HTN & Dyslipidemia problems. Chest pain: Patient ha multiple cardiac risk factors. HTN: Patient has known Hx of essential HTN. Has been treated with guideline recommended medical / physical/ diet therapy as stated below. Dyslipidemia: Patient has known Hx of mixed dyslipidemia. Has been treated with guideline recommended medical / physical/ diet therapy as stated below. He does not have any new complaints at this time. Current Outpatient Medications   Medication Sig Dispense Refill    nitroGLYCERIN (NITROSTAT) 0.4 MG SL tablet Place 1 tablet under the tongue every 5 minutes as needed for Chest pain up to max of 3 total doses.  If no relief after 1 dose, call 911. 25 tablet 3    dicyclomine (BENTYL) 10 MG capsule Take 1 capsule by mouth 4 times daily 120 capsule 5    BD PEN NEEDLE CLARENCE U/F 32G X 4 MM MISC USE 1 PEN NEEDLE DAILY AS DIRECTED 100 each 3    empagliflozin (JARDIANCE) 10 MG tablet Take 1 tablet by mouth daily 30 tablet 3    insulin glargine (LANTUS SOLOSTAR) 100 UNIT/ML injection pen Inject 40 Units into the skin nightly 5 pen 3    metFORMIN (GLUCOPHAGE) 1000 MG tablet Take 1 tablet by mouth daily (with breakfast) 30 tablet 3  omeprazole (PRILOSEC) 40 MG delayed release capsule TAKE ONE CAPSULE BY MOUTH DAILY 30 capsule 3    lisinopril (PRINIVIL;ZESTRIL) 10 MG tablet Take 1 tablet by mouth daily 30 tablet 3    aspirin 81 MG chewable tablet Take 1 tablet by mouth daily 30 tablet 3    Cholecalciferol (VITAMIN D3) 50 MCG (2000 UT) CAPS Take 1 capsule by mouth daily 30 capsule 3    loratadine (CLARITIN) 10 MG tablet Take 1 tablet by mouth daily 30 tablet 3    albuterol sulfate HFA (PROAIR HFA) 108 (90 Base) MCG/ACT inhaler Inhale 2 puffs into the lungs every 6 hours as needed for Wheezing (Patient not taking: Reported on 3/3/2021) 1 Inhaler 11     No current facility-administered medications for this visit. Allergies: Lansoprazole  Review of Systems:    Constitutional: Negative for diaphoresis and fatigue  Respiratory: Negative for shortness of breath  Cardiovascular: Negative for chest pain, dyspnea on exertion, claudication, edema, irregular heartbeat, murmur, palpitations or shortness of breath  Musculoskeletal: Negative for muscle pain, muscular weakness, negative for pain in arm and leg or swelling in foot and leg    Objective:  BP (!) 146/98   Pulse 82   Ht 6' 1\" (1.854 m)   Wt 220 lb (99.8 kg)   BMI 29.03 kg/m²   Wt Readings from Last 3 Encounters:   03/04/21 220 lb (99.8 kg)   03/03/21 219 lb (99.3 kg)   02/09/21 220 lb (99.8 kg)     Body mass index is 29.03 kg/m². GENERAL - Alert, oriented, pleasant, in no apparent distress. EYES: No jaundice, no conjunctival pallor. Neck - Supple. No jugular venous distention noted. No carotid bruits. Cardiovascular  Normal S1 and S2 without obvious murmur or gallop. Extremities - No cyanosis, clubbing, or significant edema. Varicose veins, L>R    Pulmonary  No respiratory distress. No wheezes or rales.       Lab Review   Lab Results   Component Value Date    TROPONINT <0.010 08/15/2020    TROPONINT <0.010 08/15/2020     Lab Results   Component Value Date PROBNP 23.99 08/14/2020     No results found for: INR  Lab Results   Component Value Date    LABA1C 10.9 12/30/2020    LABA1C 9.9 08/21/2020     Lab Results   Component Value Date    WBC 10.1 12/30/2020    WBC 18.7 (H) 08/15/2020    HCT 46.7 12/30/2020    HCT 46.1 08/15/2020    MCV 95.3 12/30/2020    MCV 96.4 08/15/2020     12/30/2020     (L) 08/15/2020     Lab Results   Component Value Date    CHOL 147 09/27/2018    CHOL 167 02/09/2018    TRIG 64 09/27/2018    TRIG 100 02/09/2018    HDL 45 12/30/2020    HDL 41 02/26/2020    LDLCALC 84 12/30/2020    LDLCALC 95 02/26/2020    LDLDIRECT 100 (H) 04/15/2014     Lab Results   Component Value Date    ALT 25 12/30/2020    ALT 22 08/14/2020    AST 18 12/30/2020    AST 17 08/14/2020     BMP:    Lab Results   Component Value Date     12/30/2020     08/14/2020    K 4.6 12/30/2020    K 3.6 08/14/2020     12/30/2020     08/14/2020    CO2 24 12/30/2020    CO2 18 08/14/2020    BUN 11 12/30/2020    BUN 12 08/14/2020    CREATININE 0.5 12/30/2020    CREATININE 0.5 08/14/2020     CMP:   Lab Results   Component Value Date     12/30/2020     08/14/2020    K 4.6 12/30/2020    K 3.6 08/14/2020     12/30/2020     08/14/2020    CO2 24 12/30/2020    CO2 18 08/14/2020    BUN 11 12/30/2020    BUN 12 08/14/2020    CREATININE 0.5 12/30/2020    CREATININE 0.5 08/14/2020    PROT 6.7 12/30/2020    PROT 7.2 08/14/2020    PROT 7.0 12/24/2012    PROT 6.8 03/08/2011     Lab Results   Component Value Date    TSH 1.33 09/27/2018    TSH 1.70 08/19/2014    TSHHS 0.777 08/15/2020     QUALITY MEASURES REVIEWED:  1.CAD:Patient is taking anti platelet agent:Yes  Patient does not have Hx of documented CAD  2. DYSLIPIDEMIA: Patient is on cholesterol lowering medication:No   3. Beta-Blocker therapy for CAD, if prior Myocardial Infarction:No   4. Counselled regarding smoking cessation. No   Patient does not Smoke.   5.Anticoagulation therapy (for A.Fib) No Does Not have A.Fib.  6.Discussed weight management strategies. Assessment & Plan:    Primary / Secondary prevention is the goal by aggressive risk modification, healthy and therapeutic life style changes for cardiovascular risk reduction. Various goals are discussed and multiple questions answered. ?CAD:Multiple risk factors, including Diabetes. Having chest pain symptoms. Apparently NTG did help chest pain  HTN:Yes  Not well controlled needs medication adjustment. - changes in  treatment:   no   CARDIOMYOPATHY:No  CONGESTIVE HEART FAILURE:No     VHD:no   No significant VHD noted  DYSLIPIDEMIA: yes,   Patient's profile is at / near [facility],   Does not tolerate medications well due to side effects  See most recent Lab values in Labs section above. Diabetes mellitis:yes,   BS under good control no  Hgb A1c avilable yes,   CVI:yes, ? Patient has symptomatic C4 disease  OTHER RELEVANT DIAGNOSIS:as noted in patient's active problem list:  TESTS ORDERED:   Echocardiogram,  Lexiscan Cardiolite ( Patient says he cannot walk on treadmill ), Lower extremity venus US,   All previously ordered tests reviewed. MEDICATIONS: CPM + Metoprolol 25 mg BID & Iipitor 40 mg qhs. Office f/u for test results with .

## 2021-03-04 NOTE — LETTER
Patient Name: Chika Arambula  : 1960  MRN# [id number]    REASON FOR VISIT: NEW PATIENT  Patient Active Problem List    Diagnosis Date Noted    Controlled type 2 diabetes mellitus with hyperglycemia, with long-term current use of insulin ([facility] 2021    Nocturia 2021    Allergic rhinitis 2021    Irritable bowel syndrome with diarrhea 2021    Dyspnea on exertion 2021    Dorsalgia 2020    Status post laparoscopic cholecystectomy 2020    Hx of cholecystectomy 2020    Chest pain 2020    Gastroesophageal reflux disease without esophagitis 2020    Peyronie disease 2020    Pulmonary nodules 2017    Hilar adenopathy 2017    Family history of lung cancer 2017    Type 2 diabetes mellitus without complication ([facility]     Mixed hyperlipidemia 2013    Vitamin D deficiency 2013    Essential hypertension 2012    Hepatitis C 2012    Colitis 2012       CURRENT SX:   Chest Pain    Shortness of Breath    Dizziness    Palpitations     Edema    Do you Exercise? ? Family History   Problem Relation Age of Onset    No Known Problems Mother     No Known Problems Father      Social History     Tobacco Use    Smoking status: Never Smoker    Smokeless tobacco: Never Used   Substance Use Topics    Alcohol use: Yes     Comment: occ     Current Outpatient Medications   Medication Sig Dispense Refill    nitroGLYCERIN (NITROSTAT) 0.4 MG SL tablet Place 1 tablet under the tongue every 5 minutes as needed for Chest pain up to max of 3 total doses.  If no relief after 1 dose, call 911. 25 tablet 3    dicyclomine (BENTYL) 10 MG capsule Take 1 capsule by mouth 4 times daily 120 capsule 5    BD PEN NEEDLE CLARENCE U/F 32G X 4 MM MISC USE 1 PEN NEEDLE DAILY AS DIRECTED 100 each 3    empagliflozin (JARDIANCE) 10 MG tablet Take 1 tablet by mouth daily 30 tablet 3  insulin glargine (LANTUS SOLOSTAR) 100 UNIT/ML injection pen Inject 40 Units into the skin nightly 5 pen 3    albuterol sulfate HFA (PROAIR HFA) 108 (90 Base) MCG/ACT inhaler Inhale 2 puffs into the lungs every 6 hours as needed for Wheezing (Patient not taking: Reported on 3/3/2021) 1 Inhaler 11    cyclobenzaprine (FLEXERIL) 5 MG tablet TAKE 1 TABLET BY MOUTH 2 TIMES DAILY AS NEEDED FOR MUSCLE SPASMS 60 tablet 10    metFORMIN (GLUCOPHAGE) 1000 MG tablet Take 1 tablet by mouth daily (with breakfast) 30 tablet 3    omeprazole (PRILOSEC) 40 MG delayed release capsule TAKE ONE CAPSULE BY MOUTH DAILY 30 capsule 3    atorvastatin (LIPITOR) 40 MG tablet Take 1 tablet by mouth nightly 30 tablet 3    lisinopril (PRINIVIL;ZESTRIL) 10 MG tablet Take 1 tablet by mouth daily 30 tablet 3    methocarbamol (ROBAXIN) 750 MG tablet Take 1 tablet by mouth 2 times daily 60 tablet 3    aspirin 81 MG chewable tablet Take 1 tablet by mouth daily 30 tablet 3    meclizine (ANTIVERT) 25 MG tablet Take 1 tablet by mouth 3 times daily as needed for Dizziness or Nausea 30 tablet 3    Cholecalciferol (VITAMIN D3) 50 MCG (2000 UT) CAPS Take 1 capsule by mouth daily 30 capsule 3    loratadine (CLARITIN) 10 MG tablet Take 1 tablet by mouth daily 30 tablet 3    sildenafil (VIAGRA) 100 MG tablet Take 1 tablet by mouth as needed (urinary obstruction) 30 tablet 3     No current facility-administered medications for this visit.         LABS:  Lab Results   Component Value Date    CHOL 147 09/27/2018    TRIG 64 09/27/2018    HDL 45 12/30/2020    LDLCALC 84 12/30/2020    LDLDIRECT 100 (H) 04/15/2014        STRESS TEST:  None     ECHO: NONE    CAROTID: NONE    MUGA: NONE    LAST PACER CHECK: NONE    CARDIAC CATH: NONE    ANTIARRHYTHMIC MEDS: NONE NEEDS EKG    Amio Protocol:    CHADS:     Cardiogenic Syncope: NO (If yes DO EKG)

## 2021-03-04 NOTE — LETTER
[facility]  [address] [address]  Phone: [phone]  Fax: [phone]    Yousif Mcintyre MD        March 4, 2021     MD Yudy Yoder  [facility] [address]    Patient: Ceasar Keith  MR Number: [id number]  YOB: 1960  Date of Visit: 3/4/2021    Dear Dr. Negrita Ness:    Thank you for the request for consultation for [place] to me for the evaluation of chest pain. Below are the relevant portions of my assessment and plan of care. If you have questions, please do not hesitate to call me. I look forward to following Mauricio along with you.     Sincerely,        Yousif Mcintyre MD

## 2021-03-15 ENCOUNTER — PROCEDURE VISIT (OUTPATIENT)
Dept: CARDIOLOGY CLINIC | Age: 61
End: 2021-03-15
Payer: COMMERCIAL

## 2021-03-15 DIAGNOSIS — K21.9 GASTROESOPHAGEAL REFLUX DISEASE WITHOUT ESOPHAGITIS: ICD-10-CM

## 2021-03-15 DIAGNOSIS — R91.8 PULMONARY NODULES: ICD-10-CM

## 2021-03-15 DIAGNOSIS — E78.2 MIXED HYPERLIPIDEMIA: ICD-10-CM

## 2021-03-15 DIAGNOSIS — I10 ESSENTIAL HYPERTENSION: ICD-10-CM

## 2021-03-15 DIAGNOSIS — R07.9 CHEST PAIN, UNSPECIFIED TYPE: ICD-10-CM

## 2021-03-15 DIAGNOSIS — I83.93 VARICOSE VEINS OF BOTH LOWER EXTREMITIES, UNSPECIFIED WHETHER COMPLICATED: Primary | ICD-10-CM

## 2021-03-15 DIAGNOSIS — E11.9 TYPE 2 DIABETES MELLITUS WITHOUT COMPLICATION, WITHOUT LONG-TERM CURRENT USE OF INSULIN (HCC): ICD-10-CM

## 2021-03-15 LAB
LV EF: 47 %
LV EF: 58 %
LVEF MODALITY: NORMAL
LVEF MODALITY: NORMAL

## 2021-03-15 PROCEDURE — 93970 EXTREMITY STUDY: CPT | Performed by: INTERNAL MEDICINE

## 2021-03-15 PROCEDURE — A9500 TC99M SESTAMIBI: HCPCS | Performed by: INTERNAL MEDICINE

## 2021-03-15 PROCEDURE — 78452 HT MUSCLE IMAGE SPECT MULT: CPT | Performed by: INTERNAL MEDICINE

## 2021-03-15 PROCEDURE — 93306 TTE W/DOPPLER COMPLETE: CPT | Performed by: INTERNAL MEDICINE

## 2021-03-15 PROCEDURE — 93015 CV STRESS TEST SUPVJ I&R: CPT | Performed by: INTERNAL MEDICINE

## 2021-03-18 ENCOUNTER — TELEPHONE (OUTPATIENT)
Dept: CARDIOLOGY CLINIC | Age: 61
End: 2021-03-18

## 2021-03-18 NOTE — TELEPHONE ENCOUNTER
Patient verbally understood. Echo  Left ventricular function is normal, EF is estimated at 55-60%. Mild left ventricular hypertrophy. Grade I diastolic dysfunction. Mildly dilated left atrium. No significant valvular abnormalities. No evidence of pericardial effusion. NM  Normal EF 47 % with normal ventricular contractility. No infarct or ischemia    noted.    ECG portion of stress test is negative for ischemia by diagnostic criteria.    Normal stress myocardial perfusion.    This is a normal study. Leg venous   No evidence of DVT or SVT in the bilateral common femoral vein, femoral    vein, popliteal vein, greater saphenous vein or small saphenous vein.    No significant reflux noted in the veins of the right lower extremity.    Significant reflux noted in the Left GSV at the level of mid thigh (1.2s)    and knee (2.1s).

## 2021-03-26 ENCOUNTER — OFFICE VISIT (OUTPATIENT)
Dept: CARDIOLOGY CLINIC | Age: 61
End: 2021-03-26
Payer: COMMERCIAL

## 2021-03-26 VITALS
SYSTOLIC BLOOD PRESSURE: 126 MMHG | HEIGHT: 72 IN | WEIGHT: 230.2 LBS | DIASTOLIC BLOOD PRESSURE: 82 MMHG | HEART RATE: 63 BPM | BODY MASS INDEX: 31.18 KG/M2

## 2021-03-26 DIAGNOSIS — I10 ESSENTIAL HYPERTENSION: Primary | ICD-10-CM

## 2021-03-26 DIAGNOSIS — K21.9 GASTROESOPHAGEAL REFLUX DISEASE WITHOUT ESOPHAGITIS: ICD-10-CM

## 2021-03-26 DIAGNOSIS — E11.9 TYPE 2 DIABETES MELLITUS WITHOUT COMPLICATION, WITHOUT LONG-TERM CURRENT USE OF INSULIN (HCC): ICD-10-CM

## 2021-03-26 DIAGNOSIS — E78.2 MIXED HYPERLIPIDEMIA: ICD-10-CM

## 2021-03-26 PROCEDURE — 99214 OFFICE O/P EST MOD 30 MIN: CPT | Performed by: INTERNAL MEDICINE

## 2021-03-26 NOTE — PROGRESS NOTES
OFFICE PROGRESS NOTES      Taina Ritter is a 61 y.o. male who has    CHIEF COMPLAINT AS FOLLOWS:  CHEST PAIN: Continues to C/O chest pain. SOB: No C/O SOB at this time. LEG EDEMA: No leg edema But prominent leg veins. PALPITATIONS: Denies any C/O Palpitations   DIZZINESS: No C/O Dizziness   SYNCOPE: None   OTHER:                                     HPI: Patient is here for F/U on his Chest pain, HTN & Dyslipidemia problems. Chest pain: Perfusion imaging reported to be normal but patient continues to C/O chest pain. HTN: Patient has known Hx of essential HTN. Has been treated with guideline recommended medical / physical/ diet therapy as stated below. Dyslipidemia: Patient has known Hx of mixed dyslipidemia. Has been treated with guideline recommended medical / physical/ diet therapy as stated below. He does not have any new complaints at this time. Current Outpatient Medications   Medication Sig Dispense Refill    metoprolol tartrate (LOPRESSOR) 25 MG tablet Take 1 tablet by mouth 2 times daily 60 tablet 5    nitroGLYCERIN (NITROSTAT) 0.4 MG SL tablet Place 1 tablet under the tongue every 5 minutes as needed for Chest pain up to max of 3 total doses.  If no relief after 1 dose, call 911. 25 tablet 3    dicyclomine (BENTYL) 10 MG capsule Take 1 capsule by mouth 4 times daily 120 capsule 5    BD PEN NEEDLE CLARENCE U/F 32G X 4 MM MISC USE 1 PEN NEEDLE DAILY AS DIRECTED 100 each 3    empagliflozin (JARDIANCE) 10 MG tablet Take 1 tablet by mouth daily 30 tablet 3    insulin glargine (LANTUS SOLOSTAR) 100 UNIT/ML injection pen Inject 40 Units into the skin nightly 5 pen 3    metFORMIN (GLUCOPHAGE) 1000 MG tablet Take 1 tablet by mouth daily (with breakfast) 30 tablet 3    omeprazole (PRILOSEC) 40 MG delayed release capsule TAKE ONE CAPSULE BY MOUTH DAILY 30 capsule 3    lisinopril (PRINIVIL;ZESTRIL) 10 MG tablet Take 1 tablet by mouth daily 30 tablet 3    aspirin 81 MG chewable tablet Take 1 tablet by mouth daily 30 tablet 3    Cholecalciferol (VITAMIN D3) 50 MCG (2000 UT) CAPS Take 1 capsule by mouth daily 30 capsule 3    loratadine (CLARITIN) 10 MG tablet Take 1 tablet by mouth daily 30 tablet 3    albuterol sulfate HFA (PROAIR HFA) 108 (90 Base) MCG/ACT inhaler Inhale 2 puffs into the lungs every 6 hours as needed for Wheezing (Patient not taking: Reported on 3/3/2021) 1 Inhaler 11     No current facility-administered medications for this visit. Allergies: Lansoprazole  Review of Systems:    Constitutional: Negative for diaphoresis and fatigue  Respiratory: Negative for shortness of breath  Cardiovascular: Negative for chest pain, dyspnea on exertion, claudication, edema, irregular heartbeat, murmur, palpitations or shortness of breath  Musculoskeletal: Negative for muscle pain, muscular weakness, negative for pain in arm and leg or swelling in foot and leg    Objective:  /82   Pulse 63   Ht 6' (1.829 m)   Wt 230 lb 3.2 oz (104.4 kg)   BMI 31.22 kg/m²   Wt Readings from Last 3 Encounters:   03/26/21 230 lb 3.2 oz (104.4 kg)   03/04/21 220 lb (99.8 kg)   03/03/21 219 lb (99.3 kg)     Body mass index is 31.22 kg/m². GENERAL - Alert, oriented, pleasant, in no apparent distress. EYES: No jaundice, no conjunctival pallor. Neck - Supple. No jugular venous distention noted. No carotid bruits. Cardiovascular  Normal S1 and S2 without obvious murmur or gallop. Extremities - No cyanosis, clubbing, or significant edema. Pulmonary  No respiratory distress. No wheezes or rales.       Lab Review   Lab Results   Component Value Date    TROPONINT <0.010 08/15/2020    TROPONINT <0.010 08/15/2020     Lab Results   Component Value Date    PROBNP 23.99 08/14/2020     No results found for: INR  Lab Results   Component Value Date    LABA1C 10.9 12/30/2020    LABA1C 9.9 08/21/2020     Lab Results   Component Value Date    WBC 10.1 12/30/2020    WBC 18.7 (H) 08/15/2020    HCT 46.7 12/30/2020    HCT 46.1 08/15/2020    MCV 95.3 12/30/2020    MCV 96.4 08/15/2020     12/30/2020     (L) 08/15/2020     Lab Results   Component Value Date    CHOL 147 09/27/2018    CHOL 167 02/09/2018    TRIG 64 09/27/2018    TRIG 100 02/09/2018    HDL 45 12/30/2020    HDL 41 02/26/2020    LDLCALC 84 12/30/2020    LDLCALC 95 02/26/2020    LDLDIRECT 100 (H) 04/15/2014     Lab Results   Component Value Date    ALT 25 12/30/2020    ALT 22 08/14/2020    AST 18 12/30/2020    AST 17 08/14/2020     BMP:    Lab Results   Component Value Date     12/30/2020     08/14/2020    K 4.6 12/30/2020    K 3.6 08/14/2020     12/30/2020     08/14/2020    CO2 24 12/30/2020    CO2 18 08/14/2020    BUN 11 12/30/2020    BUN 12 08/14/2020    CREATININE 0.5 12/30/2020    CREATININE 0.5 08/14/2020     CMP:   Lab Results   Component Value Date     12/30/2020     08/14/2020    K 4.6 12/30/2020    K 3.6 08/14/2020     12/30/2020     08/14/2020    CO2 24 12/30/2020    CO2 18 08/14/2020    BUN 11 12/30/2020    BUN 12 08/14/2020    CREATININE 0.5 12/30/2020    CREATININE 0.5 08/14/2020    PROT 6.7 12/30/2020    PROT 7.2 08/14/2020    PROT 7.0 12/24/2012    PROT 6.8 03/08/2011     Lab Results   Component Value Date    TSH 1.33 09/27/2018    TSH 1.70 08/19/2014    TSHHS 0.777 08/15/2020     VENOUS US 3/15/2021    No evidence of DVT or SVT in the bilateral common femoral vein, femoral    vein, popliteal vein, greater saphenous vein or small saphenous vein.    No significant reflux noted in the veins of the right lower extremity.    Significant reflux noted in the Left GSV at the level of mid thigh (1.2s)    and knee (2.1s). CARDIOLITE 3/15/2021   Normal EF 47 % with normal ventricular contractility. No infarct or ischemia    noted.    ECG portion of stress test is negative for ischemia by diagnostic criteria.    Normal stress myocardial perfusion.    This is a normal study.      ECHO 3/2021   Left ventricular function is normal, EF is estimated at 55-60%. Mild left ventricular hypertrophy. Grade I diastolic dysfunction. Mildly dilated left atrium. No significant valvular abnormalities. No evidence of pericardial effusion. QUALITY MEASURES REVIEWED:  1.CAD:Patient is taking anti platelet agent:Yes  Patient does not have Hx of documented CAD  2. DYSLIPIDEMIA: Patient is on cholesterol lowering medication:Yes   3. Beta-Blocker therapy for CAD, if prior Myocardial Infarction:Yes   4. Counselled regarding smoking cessation. No   5. Anticoagulation therapy (for A.Fib) No   Does Not have A.Fib.  6.Discussed weight management strategies. Assessment & Plan:    Primary / Secondary prevention is the goal by aggressive risk modification, healthy and therapeutic life style changes for cardiovascular risk reduction. Various goals are discussed and multiple questions answered. Chest pain:Perfusion imaging reported to be normal but patient continues to C/O chest pain. ? Due to GERD or stress  HTN:Yes  well controlled on current medical regimen, see list above. - changes in  treatment:   no      VHD:no   No significant VHD noted  DYSLIPIDEMIA: yes,   Patient's profile is at / near Goal.yes,   HDL is low   Tolerating current medical regimen wellyes,   Does not tolerate medications well due to side effects  See most recent Lab values in Labs section above. Diabetes mellitis:yes,   BS under good control No,   Hgb A1c avilable yes,   CVI:yes,    Patient has  C4 venous disease of left Leg. Advised to continue to wear compression stockings. OTHER RELEVANT DIAGNOSIS:as noted in patient's active problem list:  TESTS ORDERED:   None this visit  All previously ordered tests reviewed. MEDICATIONS: CPM. Patient to get GI W/U done, if remains symptomatic will do a cath. Office f/u in one month.

## 2021-03-26 NOTE — LETTER
[facility]  [address] [address]  Phone: [phone]  Fax: [phone]    Oliva Ruth MD        March 26, 2021     MD Yudy Carlson  [place] [id number]    Patient: Ace Bosch  MR Number: [id number]  YOB: 1960  Date of Visit: 3/26/2021    Dear Dr. Joshua Aaron:    Thank you for the request for consultation for Raina Cranker to me for the evaluation of chest pain. Below are the relevant portions of my assessment and plan of care. If you have questions, please do not hesitate to call me. I look forward to following Mauricio along with you.     Sincerely,        Oliva Ruth MD

## 2021-03-26 NOTE — LETTER
Patient Name: Christin Muñiz  : 1960  MRN# [id number]    REASON FOR VISIT:   Essential hypertension  Mixed hyperlipidemia  Type 2 diabetes mellitus without complication ([facility]  Current Outpatient Medications   Medication Sig Dispense Refill    metoprolol tartrate (LOPRESSOR) 25 MG tablet Take 1 tablet by mouth 2 times daily 60 tablet 5    nitroGLYCERIN (NITROSTAT) 0.4 MG SL tablet Place 1 tablet under the tongue every 5 minutes as needed for Chest pain up to max of 3 total doses. If no relief after 1 dose, call 911. 25 tablet 3    dicyclomine (BENTYL) 10 MG capsule Take 1 capsule by mouth 4 times daily 120 capsule 5    BD PEN NEEDLE CLARENCE U/F 32G X 4 MM MISC USE 1 PEN NEEDLE DAILY AS DIRECTED 100 each 3    empagliflozin (JARDIANCE) 10 MG tablet Take 1 tablet by mouth daily 30 tablet 3    insulin glargine (LANTUS SOLOSTAR) 100 UNIT/ML injection pen Inject 40 Units into the skin nightly 5 pen 3    albuterol sulfate HFA (PROAIR HFA) 108 (90 Base) MCG/ACT inhaler Inhale 2 puffs into the lungs every 6 hours as needed for Wheezing (Patient not taking: Reported on 3/3/2021) 1 Inhaler 11    metFORMIN (GLUCOPHAGE) 1000 MG tablet Take 1 tablet by mouth daily (with breakfast) 30 tablet 3    omeprazole (PRILOSEC) 40 MG delayed release capsule TAKE ONE CAPSULE BY MOUTH DAILY 30 capsule 3    lisinopril (PRINIVIL;ZESTRIL) 10 MG tablet Take 1 tablet by mouth daily 30 tablet 3    aspirin 81 MG chewable tablet Take 1 tablet by mouth daily 30 tablet 3    Cholecalciferol (VITAMIN D3) 50 MCG (2000 UT) CAPS Take 1 capsule by mouth daily 30 capsule 3    loratadine (CLARITIN) 10 MG tablet Take 1 tablet by mouth daily 30 tablet 3     No current facility-administered medications for this visit.       Last Visit:3/4/2021  Complaint:chest pain  Changes:testing     Last EKG:3/4/2021    VL DUP LOWER EXTREMITY VENOUS BILATERAL:3/15/2021  No evidence of DVT or SVT in the bilateral common femoral vein, femoral    vein, popliteal vein, greater saphenous vein or small saphenous vein.    No significant reflux noted in the veins of the right lower extremity.    Significant reflux noted in the Left GSV at the level of mid thigh (1.2s)    and knee (2.1s). STRESS TEST:  3/15/2021  Normal EF 47 % with normal ventricular contractility. No infarct or ischemia    noted.    ECG portion of stress test is negative for ischemia by diagnostic criteria.    Normal stress myocardial perfusion.    This is a normal study. ECHO: 3/15/2021   Left ventricular function is normal, EF is estimated at 55-60%. Mild left ventricular hypertrophy. Grade I diastolic dysfunction. Mildly dilated left atrium. No significant valvular abnormalities. No evidence of pericardial effusion    CAROTID: NONE    MUGA: NONE    LAST PACER CHECK: NONE    CARDIAC CATH: NONE    Amio Protocol:None    CHADS: [id number] Score for Atrial Fibrillation Stroke Risk   Risk   Factors  Component Value   C CHF No 0   H HTN Yes 1   A2 Age >= 76 No,  (57 y.o.) 0   D DM Yes 1   S2 Prior Stroke/TIA No 0   V Vascular Disease No 0   A Age 74-69 No,  (57 y.o.) 0   Sc Sex male 0    [id number]  Score  2   Score last updated 3/26/21 2:48 AM EDT    Click here for a link to the UpToDate guideline \"Atrial Fibrillation: Anticoagulation therapy to prevent embolization    Disclaimer: Risk Score calculation is dependent on accuracy of patient problem list and past encounter diagnosis.

## 2021-03-26 NOTE — PATIENT INSTRUCTIONS
Chest pain:Perfusion imaging reported to be normal but patient continues to C/O chest pain. ? Due to GERD or stress  HTN:Yes  well controlled on current medical regimen, see list above. - changes in  treatment:   no      VHD:no   No significant VHD noted  DYSLIPIDEMIA: yes,   Patient's profile is at / near Goal.yes,   HDL is low   Tolerating current medical regimen wellyes,   Does not tolerate medications well due to side effects  See most recent Lab values in Labs section above. Diabetes mellitis:yes,   BS under good control No,   Hgb A1c avilable yes,   CVI:yes,    Patient has symptomatic C4 disease of left Leg. OTHER RELEVANT DIAGNOSIS:as noted in patient's active problem list:  TESTS ORDERED:   None this visit  All previously ordered tests reviewed. MEDICATIONS: CPM. Patient to get GI W/U done, if remains symptomatic will do a cath. Office f/u in one month.

## 2021-03-31 ENCOUNTER — OFFICE VISIT (OUTPATIENT)
Dept: INTERNAL MEDICINE CLINIC | Age: 61
End: 2021-03-31
Payer: COMMERCIAL

## 2021-03-31 VITALS
TEMPERATURE: 96.9 F | DIASTOLIC BLOOD PRESSURE: 72 MMHG | WEIGHT: 226.2 LBS | OXYGEN SATURATION: 96 % | SYSTOLIC BLOOD PRESSURE: 130 MMHG | BODY MASS INDEX: 30.68 KG/M2 | HEART RATE: 66 BPM

## 2021-03-31 DIAGNOSIS — K21.9 GASTROESOPHAGEAL REFLUX DISEASE WITHOUT ESOPHAGITIS: ICD-10-CM

## 2021-03-31 DIAGNOSIS — I10 ESSENTIAL HYPERTENSION: ICD-10-CM

## 2021-03-31 DIAGNOSIS — R21 RASH: ICD-10-CM

## 2021-03-31 DIAGNOSIS — K58.0 IRRITABLE BOWEL SYNDROME WITH DIARRHEA: ICD-10-CM

## 2021-03-31 DIAGNOSIS — E78.2 MIXED HYPERLIPIDEMIA: ICD-10-CM

## 2021-03-31 DIAGNOSIS — E55.9 VITAMIN D DEFICIENCY: ICD-10-CM

## 2021-03-31 DIAGNOSIS — R07.89 CHEST PAIN, ATYPICAL: Primary | ICD-10-CM

## 2021-03-31 DIAGNOSIS — J30.9 ALLERGIC RHINITIS, UNSPECIFIED SEASONALITY, UNSPECIFIED TRIGGER: ICD-10-CM

## 2021-03-31 DIAGNOSIS — Z79.4 CONTROLLED TYPE 2 DIABETES MELLITUS WITH HYPERGLYCEMIA, WITH LONG-TERM CURRENT USE OF INSULIN (HCC): ICD-10-CM

## 2021-03-31 DIAGNOSIS — E11.65 CONTROLLED TYPE 2 DIABETES MELLITUS WITH HYPERGLYCEMIA, WITH LONG-TERM CURRENT USE OF INSULIN (HCC): ICD-10-CM

## 2021-03-31 DIAGNOSIS — R35.1 NOCTURIA: ICD-10-CM

## 2021-03-31 PROCEDURE — 99214 OFFICE O/P EST MOD 30 MIN: CPT | Performed by: INTERNAL MEDICINE

## 2021-03-31 RX ORDER — ATORVASTATIN CALCIUM 40 MG/1
40 TABLET, FILM COATED ORAL DAILY
Qty: 30 TABLET | Refills: 3 | Status: SHIPPED | OUTPATIENT
Start: 2021-03-31 | End: 2021-06-28

## 2021-03-31 RX ORDER — EMPAGLIFLOZIN 25 MG/1
1 TABLET, FILM COATED ORAL DAILY
Qty: 30 TABLET | Refills: 5 | Status: SHIPPED | OUTPATIENT
Start: 2021-03-31 | End: 2021-09-09

## 2021-03-31 RX ORDER — ATORVASTATIN CALCIUM 40 MG/1
TABLET, FILM COATED ORAL
COMMUNITY
Start: 2021-03-29 | End: 2021-03-31 | Stop reason: SDUPTHER

## 2021-03-31 RX ORDER — INSULIN GLARGINE 100 [IU]/ML
45 INJECTION, SOLUTION SUBCUTANEOUS NIGHTLY
Qty: 5 PEN | Refills: 3 | Status: SHIPPED | OUTPATIENT
Start: 2021-03-31 | End: 2021-09-13

## 2021-03-31 NOTE — PROGRESS NOTES
Name: Ace Bosch  [id number]  Age: 61 y.o. YOB: 1960  Sex: male    CHIEF COMPLAINT:    Chief Complaint   Patient presents with    Diabetes     pt. gave verbal BS reading. It was 123 this morning       HISTORY OF PRESENT ILLNESS:     This is a pleasant  61 y.o. male  is seen today for management of chronic medical problems and medications refills. Previous records reviewed . Doing OK . Still gets chest pain occasionally. Stress test on 3/15/2021 was OK. Dr. Des Ortiz thinks it could be heart burns or stress . Denies SOB with CP. No fever , sore throat or cough or congestion. Denies any abdominal pain. Appetite OK. Bowels moving [id number]  C/O frequent urination at night but does not want any medications for it. C/O chronic low back pain. . takes meds with help. Hearing is ok. Vision Ok with glasses. Denies  any significant skin lesions. Denies any significant depression or anxiety. Blood sugars are improving @ 130- 15- mostly. A1c was significantly high in December 2020. No other complaints. Had both shot of Covid vaccine recently. . last one a month ago.         Past Medical History:    Patient Active Problem List   Diagnosis    Essential hypertension    Hepatitis C    Colitis    Mixed hyperlipidemia    Vitamin D deficiency    Type 2 diabetes mellitus without complication ([address]    Pulmonary nodules    Hilar adenopathy    Family history of lung cancer    Peyronie disease    Gastroesophageal reflux disease without esophagitis    Precordial pain    Hx of cholecystectomy    Status post laparoscopic cholecystectomy    Dorsalgia    Dyspnea on exertion    Controlled type 2 diabetes mellitus with hyperglycemia, with long-term current use of insulin (HCC)    Nocturia    Allergic rhinitis    Irritable bowel syndrome with diarrhea    Chest pain, atypical    Rash        Past Surgical History:        Procedure Laterality Date    CHOLECYSTECTOMY, LAPAROSCOPIC N/A 8/16/2020 CHOLECYSTECTOMY LAPAROSCOPIC performed [facility]aveh [facility] at [facility]  3-    LAPAROSCOPIC APPENDECTOMY  7/12/2013       Social History:   Social History     Tobacco Use    Smoking status: Never Smoker    Smokeless tobacco: Never Used   Substance Use Topics    Alcohol use: Yes     Comment: drinks alc once a week / 1 cup tea daily        Family History:       Problem Relation Age of Onset    Heart Surgery Mother     Heart Surgery Father        Allergies:  Lansoprazole    Current Medications :      Prior to Admission medications    Medication Sig Start Date End Date Taking? Authorizing Provider   empagliflozin (JARDIANCE) 25 MG tablet Take 1 tablet by mouth daily 3/31/21  Yes Nicho Nolasco MD   atorvastatin (LIPITOR) 40 MG tablet Take 1 tablet by mouth daily 3/31/21  Yes Nicho Nolasco MD   insulin glargine (LANTUS SOLOSTAR) 100 UNIT/ML injection pen Inject 45 Units into the skin nightly 3/31/21  Yes Nicho Nolasco MD   triamcinolone (KENALOG) 0.1 % ointment Apply topically 2 times daily as needed (rash/itching) 3/31/21 4/7/21 Yes Nicho Nolasco MD   metoprolol tartrate (LOPRESSOR) 25 MG tablet Take 1 tablet by mouth 2 times daily 3/4/21  Yes Paul Pacheco MD   nitroGLYCERIN (NITROSTAT) 0.4 MG SL tablet Place 1 tablet under the tongue every 5 minutes as needed for Chest pain up to max of 3 total doses.  If no relief after 1 dose, call 911. 3/3/21  Yes Nicho Nolasco MD   dicyclomine (BENTYL) 10 MG capsule Take 1 capsule by mouth 4 times daily 3/3/21  Yes Nicho Nolasco MD   BD PEN NEEDLE CLARENCE U/F 32G X 4 MM MISC USE 1 PEN NEEDLE DAILY AS DIRECTED 3/1/21  Yes Nicho Nolasco MD   metFORMIN (GLUCOPHAGE) 1000 MG tablet Take 1 tablet by mouth daily (with breakfast) 12/30/20  Yes Nicho Nolasco MD   omeprazole (PRILOSEC) 40 MG delayed release capsule TAKE ONE CAPSULE BY MOUTH DAILY 12/30/20  Yes Nicho Nolasco MD   lisinopril (PRINIVIL;ZESTRIL) 10 MG tablet Take 1 tablet by mouth daily 12/30/20  Yes Silverio Pringle MD   aspirin 81 MG chewable tablet Take 1 tablet by mouth daily 12/30/20  Yes Silverio Pringle MD   Cholecalciferol (VITAMIN D3) 50 MCG (2000 UT) CAPS Take 1 capsule by mouth daily 12/30/20  Yes Silverio Pringle MD   loratadine (CLARITIN) 10 MG tablet Take 1 tablet by mouth daily 12/30/20  Yes Silverio Pringle MD       LAB DATA: Reviewed. REVIEW OF SYSTEMS:   see HPI/ Comprehensive review of systems negative except for the ones mentioned in HPI. PHYSICAL EXAMINATION:   /72   Pulse 66   Temp 96.9 °F (36.1 °C)   Wt 226 lb 3.2 oz (102.6 kg)   SpO2 96%   BMI 30.68 kg/m²      GENERAL APPEARANCE:    Alert, oriented x 3, well developed, cooperative, not in any distress, appears stated age. HEAD:   Normocephalic, atraumatic   EYES:   PERRLA, EOMI, lids normal, conjuctivea clear, sclera anicteric. NECK:    Supple, symmetrical,  trachea midline, no thyromegaly, no JVD, no lymphadenopathy. LUNGS:    Clear to auscultation bilaterally, respirations unlabored, accessory muscles are not used. HEART:     Regular rate and rhythm, S1 and S2 normal, no murmur, rub or gallop. PMI in MCL. ABDOMEN:    Soft, non-tender, bowel sounds are normoactive, no masses, no hepatospleenomegaly. EXTREMITY:   no bipedal edema  NEURO:  Alert, oriented to person, place and time. Grossly intact. Musculoskeletal:         No kyphosis or scoliosis, mild tenderness in his lower back. Skin:                            Warm and dry. Patient on both arms, question poison katerin. Carolyn Dues PSYCH:  Mood euthymic, insight and judgement good. Chest pain, atypical.  Stress test and echocardiogram are essentially normal.  If chest pain persist advised to call Dr. Jordin Georges for further recommendations. Continue aspirin and nitroglycerin sublingual as needed    Mixed hyperlipidemia  Patient is taking cholesterol medications regularly. Denies any side effects. Diet and exercise advised.   -     atorvastatin (LIPITOR) 40 MG tablet; Take 1 tablet by mouth daily    Essential hypertension  Continue current medications, denies side effect with medicationss. Low salt diet and exercise advised. Continue Prinivil and metoprolol    Controlled type 2 diabetes mellitus with hyperglycemia, with long-term current use of insulin (HCC)  Will increase Jardiance to 25 mg daily. Continue Glucophage, Jardiance and Lantus. -     empagliflozin (JARDIANCE) 25 MG tablet; Take 1 tablet by mouth daily  -     insulin glargine (LANTUS SOLOSTAR) 100 UNIT/ML injection pen; Inject 45 Units into the skin nightly    Gastroesophageal reflux disease without esophagitis  Continue Prilosec    Irritable bowel syndrome with diarrhea  On Bentyl as needed. Allergic rhinitis, unspecified seasonality, unspecified trigger  On Claritin as needed. Vitamin D deficiency  Continue vitamin D3    Rash  -     triamcinolone (KENALOG) 0.1 % ointment; Apply topically 2 times daily as needed (rash/itching)    Nocturia. Patient does not want any medications for it at this time. I have recommended that the patient follow CDC guidelines for prevention of COVID-19 infection. I also discussed Coronavirus precaution including wearing face mask, handwashing practice, wiping items touched in public such as gas pumps, door handles, shopping carts, etc. Also Self monitoring for infection - fever, chills, cough, SOB. Should he/she develop symptoms he/she should call office or go to ER for further instructions. Care discussed with patient. Questions answered and patient verbalizes understanding and agrees with plan. Medications reviewed and reconciled. Continue current medications. Appropriate prescriptions are ordered. Risks and benefits of meds are discussed. After visit summary provided. Advised to call for any problems, questions, or concerns. If symptoms worsen or don't improve as expected, to call us or go to ER. Follow up as directed, sooner if needed. Return in about 3 months (around 6/30/2021). This dictation was performed with a verbal recognition program and it was checked for errors. It is possible that there are still dictated errors within this office note. Any errors should be brought immediately to my attention for correction. All efforts were made to ensure that this office note is accurate.      Adrián Nunez MD

## 2021-04-26 DIAGNOSIS — M54.9 DORSALGIA: ICD-10-CM

## 2021-04-26 RX ORDER — METHOCARBAMOL 750 MG/1
TABLET, FILM COATED ORAL
Qty: 60 TABLET | Refills: 3 | Status: SHIPPED | OUTPATIENT
Start: 2021-04-26 | End: 2021-09-09

## 2021-05-18 RX ORDER — ASPIRIN 81 MG
TABLET,CHEWABLE ORAL
Qty: 30 TABLET | Refills: 3 | Status: SHIPPED | OUTPATIENT
Start: 2021-05-18 | End: 2021-09-20

## 2021-05-24 DIAGNOSIS — Z79.4 CONTROLLED TYPE 2 DIABETES MELLITUS WITH HYPERGLYCEMIA, WITH LONG-TERM CURRENT USE OF INSULIN (HCC): ICD-10-CM

## 2021-05-24 DIAGNOSIS — E11.65 CONTROLLED TYPE 2 DIABETES MELLITUS WITH HYPERGLYCEMIA, WITH LONG-TERM CURRENT USE OF INSULIN (HCC): ICD-10-CM

## 2021-06-28 DIAGNOSIS — E78.2 MIXED HYPERLIPIDEMIA: ICD-10-CM

## 2021-06-28 RX ORDER — ATORVASTATIN CALCIUM 40 MG/1
TABLET, FILM COATED ORAL
Qty: 30 TABLET | Refills: 3 | Status: SHIPPED | OUTPATIENT
Start: 2021-06-28 | End: 2022-02-23

## 2021-08-11 DIAGNOSIS — K21.9 GASTROESOPHAGEAL REFLUX DISEASE WITHOUT ESOPHAGITIS: ICD-10-CM

## 2021-08-11 DIAGNOSIS — I10 ESSENTIAL HYPERTENSION: ICD-10-CM

## 2021-08-11 RX ORDER — LISINOPRIL 10 MG/1
10 TABLET ORAL DAILY
Qty: 30 TABLET | Refills: 3 | Status: SHIPPED | OUTPATIENT
Start: 2021-08-11 | End: 2021-11-11

## 2021-08-11 RX ORDER — OMEPRAZOLE 40 MG/1
CAPSULE, DELAYED RELEASE ORAL
Qty: 30 CAPSULE | Refills: 3 | Status: SHIPPED | OUTPATIENT
Start: 2021-08-11 | End: 2022-05-03

## 2021-08-24 ENCOUNTER — OFFICE VISIT (OUTPATIENT)
Dept: INTERNAL MEDICINE CLINIC | Age: 61
End: 2021-08-24
Payer: COMMERCIAL

## 2021-08-24 VITALS
DIASTOLIC BLOOD PRESSURE: 74 MMHG | TEMPERATURE: 97.9 F | SYSTOLIC BLOOD PRESSURE: 136 MMHG | BODY MASS INDEX: 31.42 KG/M2 | HEART RATE: 62 BPM | OXYGEN SATURATION: 96 % | WEIGHT: 232 LBS | HEIGHT: 72 IN

## 2021-08-24 DIAGNOSIS — E78.2 MIXED HYPERLIPIDEMIA: ICD-10-CM

## 2021-08-24 DIAGNOSIS — R35.1 BENIGN PROSTATIC HYPERPLASIA WITH NOCTURIA: ICD-10-CM

## 2021-08-24 DIAGNOSIS — I10 ESSENTIAL HYPERTENSION: ICD-10-CM

## 2021-08-24 DIAGNOSIS — Z79.4 CONTROLLED TYPE 2 DIABETES MELLITUS WITH HYPERGLYCEMIA, WITH LONG-TERM CURRENT USE OF INSULIN (HCC): ICD-10-CM

## 2021-08-24 DIAGNOSIS — E11.65 CONTROLLED TYPE 2 DIABETES MELLITUS WITH HYPERGLYCEMIA, WITH LONG-TERM CURRENT USE OF INSULIN (HCC): ICD-10-CM

## 2021-08-24 DIAGNOSIS — K21.9 GASTROESOPHAGEAL REFLUX DISEASE WITHOUT ESOPHAGITIS: ICD-10-CM

## 2021-08-24 DIAGNOSIS — M25.511 ACUTE PAIN OF RIGHT SHOULDER: Primary | ICD-10-CM

## 2021-08-24 DIAGNOSIS — N40.1 BENIGN PROSTATIC HYPERPLASIA WITH NOCTURIA: ICD-10-CM

## 2021-08-24 LAB
CHP ED QC CHECK: NORMAL
GLUCOSE BLD-MCNC: 228 MG/DL

## 2021-08-24 PROCEDURE — 82962 GLUCOSE BLOOD TEST: CPT | Performed by: INTERNAL MEDICINE

## 2021-08-24 PROCEDURE — 99214 OFFICE O/P EST MOD 30 MIN: CPT | Performed by: INTERNAL MEDICINE

## 2021-08-24 RX ORDER — TAMSULOSIN HYDROCHLORIDE 0.4 MG/1
0.4 CAPSULE ORAL DAILY
Qty: 30 CAPSULE | Refills: 5 | Status: SHIPPED | OUTPATIENT
Start: 2021-08-24 | End: 2022-01-19 | Stop reason: SDUPTHER

## 2021-08-24 RX ORDER — NAPROXEN 375 MG/1
375 TABLET ORAL 2 TIMES DAILY WITH MEALS
Qty: 60 TABLET | Refills: 5 | Status: SHIPPED | OUTPATIENT
Start: 2021-08-24 | End: 2022-01-24

## 2021-08-24 NOTE — PROGRESS NOTES
Name: Annabel Torres  [id number]  Age: 64 y.o. YOB: 1960  Sex: male    CHIEF COMPLAINT:    Chief Complaint   Patient presents with    Diabetes    Hypertension    Shoulder Pain     right side about 2 months now    Other     other chronic conditions       HISTORY OF PRESENT ILLNESS:     This is a pleasant  64 y.o. male  is seen today for management of chronic medical problems and medications refills. Previous records reviewed . Doing OK . C/O pain right shoulder for 6-8 weeks. Does not recall any injury. Cannot lift arm above his shoulder level. Taking Ibuprofen OTC and Robaxin without much help. His lower back pain is better since he went to a chiropractor for a couple of sessions. No more chest pains. Stress test on 3/15/2021 was OK. Dr. Chava Davidson thinks it could be heart burns or stress . Denies SOB. No fever , sore throat or cough or congestion. Denies any abdominal pain. Denies any gastritis symptoms. Appetite OK. Bowels moving [address]. C/O frequent urination at night  around 4-5 times a night. Hearing is ok. Vision Ok with glasses. Denies  any significant skin lesions. Denies any significant depression or anxiety. Blood sugars are improving @ 130- 150 mostly. A1c was significantly high in December 2020. No other complaints.   He has been fully vaccinated for COVID-19.       Past Medical History:    Patient Active Problem List   Diagnosis    Essential hypertension    Hepatitis C    Colitis    Mixed hyperlipidemia    Vitamin D deficiency    Type 2 diabetes mellitus without complication ([address]    Pulmonary nodules    Hilar adenopathy    Family history of lung cancer    Peyronie disease    Gastroesophageal reflux disease without esophagitis    Precordial pain    Hx of cholecystectomy    Status post laparoscopic cholecystectomy    Dorsalgia    Dyspnea on exertion    Controlled type 2 diabetes mellitus with hyperglycemia, with long-term current use of insulin ([address]  Benign prostatic hyperplasia with nocturia    Allergic rhinitis    Irritable bowel syndrome with diarrhea    Chest pain, atypical    Rash        Past Surgical History:        Procedure Laterality Date    CHOLECYSTECTOMY, LAPAROSCOPIC N/A 8/16/2020    CHOLECYSTECTOMY LAPAROSCOPIC performed by Blanca Flores MD at [facility]  3-    LAPAROSCOPIC APPENDECTOMY  7/12/2013       Social History:   Social History     Tobacco Use    Smoking status: Never Smoker    Smokeless tobacco: Never Used   Substance Use Topics    Alcohol use: Yes     Comment: drinks alc once a week / 1 cup tea daily        Family History:       Problem Relation Age of Onset    Heart Surgery Mother     Heart Surgery Father        Allergies:  Lansoprazole    Current Medications :      Prior to Admission medications    Medication Sig Start Date End Date Taking?  Authorizing Provider   naproxen (NAPROSYN) 375 MG tablet Take 1 tablet by mouth 2 times daily (with meals) 8/24/21  Yes Katalina Mckeon MD   tamsulosin (FLOMAX) 0.4 MG capsule Take 1 capsule by mouth daily 8/24/21  Yes Katalina Mckeon MD   omeprazole (PRILOSEC) 40 MG delayed release capsule TAKE ONE CAPSULE BY MOUTH DAILY 8/11/21  Yes Katalina Mckeon MD   lisinopril (PRINIVIL;ZESTRIL) 10 MG tablet TAKE 1 TABLET BY MOUTH DAILY 8/11/21  Yes Katalina Mckeon MD   atorvastatin (LIPITOR) 40 MG tablet TAKE 1 TABLET BY MOUTH NIGHTLY 6/28/21  Yes Katalina Mckeon MD   metFORMIN (GLUCOPHAGE) 1000 MG tablet TAKE 1 TABLET BY MOUTH DAILY (WITH BREAKFAST) 5/24/21  Yes Katalina Mckeon MD   ASPIRIN LOW DOSE 81 MG chewable tablet TAKE 1 TABLET BY MOUTH DAILY 5/18/21  Yes Katalina Mckeon MD   methocarbamol (ROBAXIN) 750 MG tablet TAKE 1 TABLET BY MOUTH TWICE A DAY 4/26/21  Yes Katalina Mckeon MD   empagliflozin (JARDIANCE) 25 MG tablet Take 1 tablet by mouth daily 3/31/21  Yes Katalina Mckeon MD   insulin glargine (LANTUS SOLOSTAR) 100 UNIT/ML injection pen Inject 45 Units into the skin nightly 3/31/21  Yes Lakia Sanderson MD   metoprolol tartrate (LOPRESSOR) 25 MG tablet Take 1 tablet by mouth 2 times daily 3/4/21  Yes Bert Hollis MD   nitroGLYCERIN (NITROSTAT) 0.4 MG SL tablet Place 1 tablet under the tongue every 5 minutes as needed for Chest pain up to max of 3 total doses. If no relief after 1 dose, call 911. 3/3/21  Yes Lakia Sanderson MD   dicyclomine (BENTYL) 10 MG capsule Take 1 capsule by mouth 4 times daily 3/3/21  Yes Lakia Sanderson MD   BD PEN NEEDLE CLARENCE U/F 32G X 4 MM MISC USE 1 PEN NEEDLE DAILY AS DIRECTED 3/1/21  Yes Lakia Sanderson MD   Cholecalciferol (VITAMIN D3) 50 MCG (2000 UT) CAPS Take 1 capsule by mouth daily 12/30/20  Yes Lakia Sanderson MD   loratadine (CLARITIN) 10 MG tablet Take 1 tablet by mouth daily 12/30/20  Yes Lakia Sanderson MD       LAB DATA: Reviewed. REVIEW OF SYSTEMS:   see HPI/ Comprehensive review of systems negative except for the ones mentioned in HPI. PHYSICAL EXAMINATION:   /74 (Site: Left Upper Arm, Position: Sitting, Cuff Size: Medium Adult)   Pulse 62   Temp 97.9 °F (36.6 °C)   Ht 6' (1.829 m)   Wt 232 lb (105.2 kg)   SpO2 96%   BMI 31.46 kg/m²      GENERAL APPEARANCE:    Alert, oriented x 3, well developed, cooperative, not in any distress, appears stated age. HEAD:   Normocephalic, atraumatic   EYES:   PERRLA, EOMI, lids normal, conjuctivea clear, sclera anicteric. NECK:    Supple, symmetrical,  trachea midline, no thyromegaly, no JVD, no lymphadenopathy. LUNGS:    Clear to auscultation bilaterally, respirations unlabored, accessory muscles are not used. HEART:     Regular rate and rhythm, S1 and S2 normal, no murmur, rub or gallop. PMI in MCL. ABDOMEN:    Soft, non-tender, bowel sounds are normoactive, no masses, no hepatospleenomegaly. EXTREMITY:   no bipedal edema, significant tenderness in his right shoulder and decreased range of motion of the right shoulder. NEURO:  Alert, oriented to person, place and time.      Grossly intact. Musculoskeletal:         No kyphosis or scoliosis, no deformity in any extremity noted, muscle strength and tone are normal.  Skin:                            Warm and dry. No rash or obvious suspicious lesions. PSYCH:  Mood euthymic, insight and judgement good. ASSESSMENT/PLAN:    1. Acute pain of right shoulder  Start him on naproxen 375 mg twice daily as needed. Advised not to take ibuprofen OTC. Can take Tylenol as needed. Also written a prescription for the diclofenac based cream to apply 2-3 times a day. Refer to orthopedic doctor. - Sagrario Agarwal MD, Orthopedic Surgery, [place]  - naproxen (NAPROSYN) 375 MG tablet; Take 1 tablet by mouth 2 times daily (with meals)  Dispense: 60 tablet; Refill: 5    2. Controlled type 2 diabetes mellitus with hyperglycemia, with long-term current use of insulin ([facility])  Advised low sugar diet and exercise and weight loss. Continue Jardiance and Lantus  - POCT Glucose  - Hemoglobin A1C; Future  - Microalbumin / Creatinine Urine Ratio; Future    3. Essential hypertension  Stable,Continue current medications, denies side effect with medicationss. Low salt diet and exercise advised. Continue lisinopril and metoprolol  - Comprehensive Metabolic Panel; Future  - CBC Auto Differential; Future    4. Mixed hyperlipidemia  Patient is taking cholesterol medications regularly. Denies any side effects. Diet and exercise advised. Continue Lipitor  - Lipid, Fasting; Future    5. Benign prostatic hyperplasia with nocturia  Will start him on Flomax 0.4 mg nightly. - tamsulosin (FLOMAX) 0.4 MG capsule; Take 1 capsule by mouth daily  Dispense: 30 capsule; Refill: 5    Gastroesophageal reflux disease without esophagitis. Continue Prilosec    Care discussed with patient. Questions answered and patient verbalizes understanding and agrees with plan. Medications reviewed and reconciled. Continue current medications.   Appropriate prescriptions are ordered. Risks and benefits of meds are discussed. After visit summary provided. Advised to call for any problems, questions, or concerns. If symptoms worsen or don't improve as expected, to call us or go to ER. Follow up as directed, sooner if needed. Return in about 3 months (around 11/24/2021). This dictation was performed with a verbal recognition program and it was checked for errors. It is possible that there are still dictated errors within this office note. Any errors should be brought immediately to my attention for correction. All efforts were made to ensure that this office note is accurate.      Ivett Lara MD MD

## 2021-09-09 DIAGNOSIS — M54.9 DORSALGIA: ICD-10-CM

## 2021-09-09 DIAGNOSIS — Z79.4 CONTROLLED TYPE 2 DIABETES MELLITUS WITH HYPERGLYCEMIA, WITH LONG-TERM CURRENT USE OF INSULIN (HCC): ICD-10-CM

## 2021-09-09 DIAGNOSIS — E11.65 CONTROLLED TYPE 2 DIABETES MELLITUS WITH HYPERGLYCEMIA, WITH LONG-TERM CURRENT USE OF INSULIN (HCC): ICD-10-CM

## 2021-09-09 RX ORDER — EMPAGLIFLOZIN 25 MG/1
1 TABLET, FILM COATED ORAL DAILY
Qty: 30 TABLET | Refills: 5 | Status: SHIPPED | OUTPATIENT
Start: 2021-09-09 | End: 2022-02-23 | Stop reason: SDUPTHER

## 2021-09-09 RX ORDER — METHOCARBAMOL 750 MG/1
TABLET, FILM COATED ORAL
Qty: 60 TABLET | Refills: 3 | Status: SHIPPED | OUTPATIENT
Start: 2021-09-09 | End: 2022-02-23

## 2021-09-13 RX ORDER — INSULIN GLARGINE 100 [IU]/ML
45 INJECTION, SOLUTION SUBCUTANEOUS NIGHTLY
Qty: 15 ML | Refills: 10 | Status: SHIPPED | OUTPATIENT
Start: 2021-09-13 | End: 2022-02-23 | Stop reason: SDUPTHER

## 2021-09-20 RX ORDER — ASPIRIN 81 MG
TABLET,CHEWABLE ORAL
Qty: 30 TABLET | Refills: 3 | Status: SHIPPED | OUTPATIENT
Start: 2021-09-20 | End: 2022-01-19 | Stop reason: SDUPTHER

## 2021-09-23 ENCOUNTER — OFFICE VISIT (OUTPATIENT)
Dept: ORTHOPEDIC SURGERY | Age: 61
End: 2021-09-23
Payer: COMMERCIAL

## 2021-09-23 VITALS
HEART RATE: 74 BPM | WEIGHT: 220 LBS | OXYGEN SATURATION: 96 % | HEIGHT: 72 IN | BODY MASS INDEX: 29.8 KG/M2 | RESPIRATION RATE: 16 BRPM

## 2021-09-23 DIAGNOSIS — M75.01 ADHESIVE CAPSULITIS OF RIGHT SHOULDER: ICD-10-CM

## 2021-09-23 PROCEDURE — 20610 DRAIN/INJ JOINT/BURSA W/O US: CPT | Performed by: PHYSICIAN ASSISTANT

## 2021-09-23 PROCEDURE — 99242 OFF/OP CONSLTJ NEW/EST SF 20: CPT | Performed by: PHYSICIAN ASSISTANT

## 2021-09-23 ASSESSMENT — ENCOUNTER SYMPTOMS
RESPIRATORY NEGATIVE: 1
GASTROINTESTINAL NEGATIVE: 1
EYES NEGATIVE: 1

## 2021-09-23 NOTE — PROGRESS NOTES
[facility]      HPI:  Arleth Valera is a 64 y.o. male who presents the office today with complaints of right shoulder pain has been going on for about 2 or 3 months. He states that he had no injury but he states that he has noticed a loss of range of motion pain in the shoulder which is not getting better. He has tried anti-inflammatory medications and Tylenol with no relief. He is right-hand dominant. He does have a history of diabetes mellitus. Patient states that his pain in the shoulder is worse anytime he tries to move the shoulder he rates it at a 6/10 sometimes worse. I reviewed and agree with the portions of the HPI, review of systems, vital documentation and plan performed by my staff and have added/addended where appropriate. The patient was referred by Kamilla Lyons MD for evaluation of right shoulder pain. Past Medical History:   Diagnosis Date    Colitis     Diabetes mellitus ([facility] [address]     DJD (degenerative joint disease) of lumbar spine 12/21/2006    MRI Lumbar spine    Dyspnea on exertion 02/09/2021    External hemorrhoids     Family history of lung cancer 01/16/2017    Gallstones 12/15/2009    CT thorax    GERD (gastroesophageal reflux disease)     H/O cardiovascular stress test 03/17/2014    3/14-WNL. EF 62%. Exercise capacity 12.8 METS.  Hepatitis C     Hilar adenopathy 01/16/2017    History of Doppler venous legs 03/15/2021    Significant reflux noted in the Left GSV at the level of mid thigh (1.2s)and knee (2.1s).  History of stress test 03/15/2021    ECG portion of stress test is negative for ischemia by diagnostic criteria. Normal EF 47 % with normal ventricular contractility. No infarct or ischemia    Hx of echocardiogram 03/15/2021    55-60% Mild left ventricular hypertrophyGrade I diastolic dysfunction. Mildly dilated left atrium    Hypertension     Lipoma 03/13/2015    submucosal    Multiple lung nodules 01/16/2017    Polyp, sigmoid colon 01/05/2010    hyperplastic    Pulmonary nodules 12/16/2009    followed by Dr Paulina Coleman Sleep apnea, obstructive 02/2000    nasal CPAP 8cm    Vertigo        Past Surgical History:   Procedure Laterality Date    CHOLECYSTECTOMY, LAPAROSCOPIC N/A 8/16/2020    CHOLECYSTECTOMY LAPAROSCOPIC performed by [facility]arek [facility] at [facility]  3-    LAPAROSCOPIC APPENDECTOMY  7/12/2013       Family History   Problem Relation Age of Onset    Heart Surgery Mother     Heart Surgery Father        Social History     Socioeconomic History    Marital status:      Spouse name: None    Number of children: None    Years of education: None    Highest education level: None   Occupational History    None   Tobacco Use    Smoking status: Never Smoker    Smokeless tobacco: Never Used   Substance and Sexual Activity    Alcohol use: Yes     Comment: drinks alc once a week / 1 cup tea daily     Drug use: No    Sexual activity: Yes     Partners: Female   Other Topics Concern    None   Social History Narrative    None     Social Determinants of Health     Financial Resource Strain: High Risk    Difficulty of Paying Living Expenses: Hard   Food Insecurity: No Food Insecurity    Worried About Running Out of Food in the Last Year: Never true    Caleb of Food in the Last Year: Never true   Transportation Needs:     Lack of Transportation (Medical):      Lack of Transportation (Non-Medical):    Physical Activity:     Days of Exercise per Week:     Minutes of Exercise per Session:    Stress:     Feeling of Stress :    Social Connections:     Frequency of Communication with Friends and Family:     Frequency of Social Gatherings with Friends and Family:     Attends Buddhist Services:     Active Member of Clubs or Organizations:     Attends Club or Organization Meetings:     Marital Status:    Intimate Partner Violence:     Fear of Current or Ex-Partner:     Emotionally Abused:  Physically Abused:     Sexually Abused:        Current Outpatient Medications   Medication Sig Dispense Refill    ASPIRIN LOW DOSE 81 MG chewable tablet CHEW 1 TABLET BY MOUTH DAILY 30 tablet 3    metoprolol tartrate (LOPRESSOR) 25 MG tablet TAKE 1 TABLET BY MOUTH 2 TIMES DAILY 60 tablet 10    JARDIANCE 25 MG tablet TAKE 1 TABLET BY MOUTH DAILY 30 tablet 5    methocarbamol (ROBAXIN) 750 MG tablet TAKE 1 TABLET BY MOUTH TWICE A DAY 60 tablet 3    naproxen (NAPROSYN) 375 MG tablet Take 1 tablet by mouth 2 times daily (with meals) 60 tablet 5    tamsulosin (FLOMAX) 0.4 MG capsule Take 1 capsule by mouth daily 30 capsule 5    omeprazole (PRILOSEC) 40 MG delayed release capsule TAKE ONE CAPSULE BY MOUTH DAILY 30 capsule 3    lisinopril (PRINIVIL;ZESTRIL) 10 MG tablet TAKE 1 TABLET BY MOUTH DAILY 30 tablet 3    atorvastatin (LIPITOR) 40 MG tablet TAKE 1 TABLET BY MOUTH NIGHTLY 30 tablet 3    metFORMIN (GLUCOPHAGE) 1000 MG tablet TAKE 1 TABLET BY MOUTH DAILY (WITH BREAKFAST) 30 tablet 3    nitroGLYCERIN (NITROSTAT) 0.4 MG SL tablet Place 1 tablet under the tongue every 5 minutes as needed for Chest pain up to max of 3 total doses. If no relief after 1 dose, call 911. 25 tablet 3    dicyclomine (BENTYL) 10 MG capsule Take 1 capsule by mouth 4 times daily 120 capsule 5    BD PEN NEEDLE CLARENCE U/F 32G X 4 MM MISC USE 1 PEN NEEDLE DAILY AS DIRECTED 100 each 3    Cholecalciferol (VITAMIN D3) 50 MCG (2000 UT) CAPS Take 1 capsule by mouth daily 30 capsule 3    loratadine (CLARITIN) 10 MG tablet Take 1 tablet by mouth daily 30 tablet 3    LANTUS SOLOSTAR 100 UNIT/ML injection pen INJECT 45 UNITS INTO THE SKIN NIGHTLY 15 mL 10     No current facility-administered medications for this visit.        Allergies   Allergen Reactions    Lansoprazole      diarrhea  cramping       Vitals:    09/23/21 0817   Pulse: 74   Resp: 16   SpO2: 96%   Weight: 220 lb (99.8 kg)   Height: 6' (1.829 m)         Review of Systems: Review of Systems   Constitutional: Negative. HENT: Negative. Eyes: Negative. Respiratory: Negative. Cardiovascular: Negative. Gastrointestinal: Negative. Genitourinary: Negative. Musculoskeletal: Positive for arthralgias and myalgias. Skin: Negative. Neurological: Negative. Psychiatric/Behavioral: Negative. Physical Exam:   Gen/Psych:Examination reveals a pleasant individual in no acute distress. The patient is oriented to time, place and person. The patient's mood and affect are appropriate. Patient appears well nourished. Body habitus is normal    HEENT: Head is atraumatic normocephalic, ears are symmetric, eyes show equal pupils bilaterally, extraocular muscles intact. Hearing is intact to normal voice at 5 feet. Nares are patent bilaterally, no epistaxis, no rhinorrhea. Lymph:  No obvious lymphedema in bilateral upper extremities     Skin: Intact in bilateral upper extremities with no ulcerations, lesions, rash, erythema. Vascular: There are no varicosities in bilateral upper  extremities, sensation intact to light touch over bilateral upper extremities. Musculoskeletal:  Right shoulder exam:  Skin:  Clear with no erythema, there is no significant joint effusion  Deformity:  none  Atrophy:  none  Tenderness:  mild globally  Active ROM:   FE:100    IR side: sacrum           ER side: 20   Ad/Abduction: 110     Strength: FE:5/5     ER:5/5  Neer:  mildly positive  Hall:  mildly positive  Yergason:not positive  Sandoval:  not positive    Cervical Spine tenderness: no      Imagin views of the right shoulder taken and reviewed in the office today show no acute fractures, no dislocations, no significant degenerative changes. The official read and interpretation of these x-rays will be done by the the [facility]       Assessment:    Diagnosis Orders   1.  Adhesive capsulitis of right shoulder  [facility]    CT ARTHROCENTESIS ASPIR&/INJ MAJOR JT/BURSA W/O US         Plan:   I explained to the patient that today he is showing signs of adhesive capsulitis in the right shoulder based on his history. I explained that physical therapy is the mainstay of treatment at this time and also possibly steroid injection to help with some the inflammation to allow him to do some of this therapy. We will proceed with the following plan. Patient Instructions   PT ordered  Work on ROM and strengthening exercises per PT protocol  Rest, ice, and elevate  Weightbearing as tolerated  Injection into the right shoulder  Follow up in the 2 months        Right Subacromial Bursa Aspiration / Injection Procedure:  Multiple treatment options were discussed. This injection was recommended as a part of the overall treatment plan. Details of the procedure, potential risks, and potential benefits were discussed. Patient's questions were answered. Patient elected to proceed with procedure. Medication: 2 mL Kenalog 40 mg/ML, 2 mL 0.5% bupivacaine, 2 mL 1% lidocaine  Procedure:  Sterile technique was used as the skin over the injection site was prepped with alcohol. The right subacromial bursa was then injected with the above listed medication. A sterile bandage was placed over the injection site. The patient tolerated the procedure well without complication. *Please note this report has been partially produced using speech recognition Dragon software and may contain errors related to that system including errors in grammar, punctuation, and spelling, as well as words and phrases that may be inappropriate.  If there are any questions or concerns please feel free to contact the dictating provider for clarification

## 2021-09-23 NOTE — PATIENT INSTRUCTIONS
PT ordered  Work on ROM and strengthening exercises per PT protocol  Rest, ice, and elevate  Weightbearing as tolerated  Injection into the right shoulder  Follow up in the 2 months

## 2021-09-24 DIAGNOSIS — Z79.4 CONTROLLED TYPE 2 DIABETES MELLITUS WITH HYPERGLYCEMIA, WITH LONG-TERM CURRENT USE OF INSULIN (HCC): ICD-10-CM

## 2021-09-24 DIAGNOSIS — E11.65 CONTROLLED TYPE 2 DIABETES MELLITUS WITH HYPERGLYCEMIA, WITH LONG-TERM CURRENT USE OF INSULIN (HCC): ICD-10-CM

## 2021-11-11 DIAGNOSIS — I10 ESSENTIAL HYPERTENSION: ICD-10-CM

## 2021-11-11 RX ORDER — LISINOPRIL 10 MG/1
10 TABLET ORAL DAILY
Qty: 30 TABLET | Refills: 3 | Status: SHIPPED | OUTPATIENT
Start: 2021-11-11 | End: 2022-02-23 | Stop reason: SDUPTHER

## 2022-01-19 DIAGNOSIS — R35.1 BENIGN PROSTATIC HYPERPLASIA WITH NOCTURIA: ICD-10-CM

## 2022-01-19 DIAGNOSIS — E11.65 CONTROLLED TYPE 2 DIABETES MELLITUS WITH HYPERGLYCEMIA, WITH LONG-TERM CURRENT USE OF INSULIN (HCC): ICD-10-CM

## 2022-01-19 DIAGNOSIS — N40.1 BENIGN PROSTATIC HYPERPLASIA WITH NOCTURIA: ICD-10-CM

## 2022-01-19 DIAGNOSIS — Z79.4 CONTROLLED TYPE 2 DIABETES MELLITUS WITH HYPERGLYCEMIA, WITH LONG-TERM CURRENT USE OF INSULIN (HCC): ICD-10-CM

## 2022-01-19 RX ORDER — TAMSULOSIN HYDROCHLORIDE 0.4 MG/1
0.4 CAPSULE ORAL DAILY
Qty: 30 CAPSULE | Refills: 0 | Status: SHIPPED | OUTPATIENT
Start: 2022-01-19 | End: 2022-02-23 | Stop reason: SDUPTHER

## 2022-01-19 RX ORDER — ASPIRIN 81 MG/1
81 TABLET, CHEWABLE ORAL DAILY
Qty: 30 TABLET | Refills: 0 | Status: SHIPPED | OUTPATIENT
Start: 2022-01-19 | End: 2022-02-23 | Stop reason: SDUPTHER

## 2022-01-24 DIAGNOSIS — M25.511 ACUTE PAIN OF RIGHT SHOULDER: ICD-10-CM

## 2022-01-24 RX ORDER — NAPROXEN 375 MG/1
TABLET ORAL
Qty: 60 TABLET | Refills: 5 | Status: SHIPPED | OUTPATIENT
Start: 2022-01-24 | End: 2022-02-23 | Stop reason: SDUPTHER

## 2022-02-22 ENCOUNTER — NURSE ONLY (OUTPATIENT)
Dept: INTERNAL MEDICINE CLINIC | Age: 62
End: 2022-02-22
Payer: COMMERCIAL

## 2022-02-22 DIAGNOSIS — E11.65 CONTROLLED TYPE 2 DIABETES MELLITUS WITH HYPERGLYCEMIA, WITH LONG-TERM CURRENT USE OF INSULIN (HCC): Primary | ICD-10-CM

## 2022-02-22 DIAGNOSIS — I10 ESSENTIAL HYPERTENSION: ICD-10-CM

## 2022-02-22 DIAGNOSIS — E78.2 MIXED HYPERLIPIDEMIA: ICD-10-CM

## 2022-02-22 DIAGNOSIS — Z79.4 CONTROLLED TYPE 2 DIABETES MELLITUS WITH HYPERGLYCEMIA, WITH LONG-TERM CURRENT USE OF INSULIN (HCC): Primary | ICD-10-CM

## 2022-02-22 LAB
A/G RATIO: 2.2 (ref 1.1–2.2)
ALBUMIN SERPL-MCNC: 4.6 G/DL (ref 3.4–5)
ALP BLD-CCNC: 95 U/L (ref 40–129)
ALT SERPL-CCNC: 43 U/L (ref 10–40)
ANION GAP SERPL CALCULATED.3IONS-SCNC: 15 MMOL/L (ref 3–16)
AST SERPL-CCNC: 24 U/L (ref 15–37)
BASOPHILS ABSOLUTE: 0 K/UL (ref 0–0.2)
BASOPHILS RELATIVE PERCENT: 0.2 %
BILIRUB SERPL-MCNC: 0.5 MG/DL (ref 0–1)
BUN BLDV-MCNC: 18 MG/DL (ref 7–20)
CALCIUM SERPL-MCNC: 9.4 MG/DL (ref 8.3–10.6)
CHLORIDE BLD-SCNC: 102 MMOL/L (ref 99–110)
CHOLESTEROL, FASTING: 93 MG/DL (ref 0–199)
CO2: 24 MMOL/L (ref 21–32)
CREAT SERPL-MCNC: 0.7 MG/DL (ref 0.8–1.3)
CREATININE URINE: 84.7 MG/DL (ref 39–259)
EOSINOPHILS ABSOLUTE: 0.1 K/UL (ref 0–0.6)
EOSINOPHILS RELATIVE PERCENT: 1.3 %
GFR AFRICAN AMERICAN: >60
GFR NON-AFRICAN AMERICAN: >60
GLUCOSE BLD-MCNC: 142 MG/DL (ref 70–99)
HCT VFR BLD CALC: 48.7 % (ref 40.5–52.5)
HDLC SERPL-MCNC: 42 MG/DL (ref 40–60)
HEMOGLOBIN: 16.2 G/DL (ref 13.5–17.5)
LDL CHOLESTEROL CALCULATED: 39 MG/DL
LYMPHOCYTES ABSOLUTE: 3.2 K/UL (ref 1–5.1)
LYMPHOCYTES RELATIVE PERCENT: 33.5 %
MCH RBC QN AUTO: 32.1 PG (ref 26–34)
MCHC RBC AUTO-ENTMCNC: 33.4 G/DL (ref 31–36)
MCV RBC AUTO: 96.1 FL (ref 80–100)
MICROALBUMIN UR-MCNC: <1.2 MG/DL
MICROALBUMIN/CREAT UR-RTO: NORMAL MG/G (ref 0–30)
MONOCYTES ABSOLUTE: 0.8 K/UL (ref 0–1.3)
MONOCYTES RELATIVE PERCENT: 8.1 %
NEUTROPHILS ABSOLUTE: 5.5 K/UL (ref 1.7–7.7)
NEUTROPHILS RELATIVE PERCENT: 56.9 %
PDW BLD-RTO: 13 % (ref 12.4–15.4)
PLATELET # BLD: 142 K/UL (ref 135–450)
PMV BLD AUTO: 8.3 FL (ref 5–10.5)
POTASSIUM SERPL-SCNC: 4.4 MMOL/L (ref 3.5–5.1)
RBC # BLD: 5.06 M/UL (ref 4.2–5.9)
SODIUM BLD-SCNC: 141 MMOL/L (ref 136–145)
TOTAL PROTEIN: 6.7 G/DL (ref 6.4–8.2)
TRIGLYCERIDE, FASTING: 61 MG/DL (ref 0–150)
VLDLC SERPL CALC-MCNC: 12 MG/DL
WBC # BLD: 9.6 K/UL (ref 4–11)

## 2022-02-22 PROCEDURE — 99211 OFF/OP EST MAY X REQ PHY/QHP: CPT | Performed by: INTERNAL MEDICINE

## 2022-02-23 ENCOUNTER — OFFICE VISIT (OUTPATIENT)
Dept: INTERNAL MEDICINE CLINIC | Age: 62
End: 2022-02-23
Payer: COMMERCIAL

## 2022-02-23 VITALS
HEIGHT: 72 IN | SYSTOLIC BLOOD PRESSURE: 124 MMHG | HEART RATE: 65 BPM | DIASTOLIC BLOOD PRESSURE: 79 MMHG | TEMPERATURE: 97.8 F | WEIGHT: 240 LBS | OXYGEN SATURATION: 98 % | BODY MASS INDEX: 32.51 KG/M2

## 2022-02-23 DIAGNOSIS — K58.0 IRRITABLE BOWEL SYNDROME WITH DIARRHEA: ICD-10-CM

## 2022-02-23 DIAGNOSIS — M54.9 DORSALGIA: ICD-10-CM

## 2022-02-23 DIAGNOSIS — M25.511 ACUTE PAIN OF RIGHT SHOULDER: ICD-10-CM

## 2022-02-23 DIAGNOSIS — K21.9 GASTROESOPHAGEAL REFLUX DISEASE WITHOUT ESOPHAGITIS: ICD-10-CM

## 2022-02-23 DIAGNOSIS — I10 ESSENTIAL HYPERTENSION: ICD-10-CM

## 2022-02-23 DIAGNOSIS — E55.9 VITAMIN D DEFICIENCY: ICD-10-CM

## 2022-02-23 DIAGNOSIS — Z79.4 CONTROLLED TYPE 2 DIABETES MELLITUS WITH HYPERGLYCEMIA, WITH LONG-TERM CURRENT USE OF INSULIN (HCC): Primary | ICD-10-CM

## 2022-02-23 DIAGNOSIS — N40.1 BENIGN PROSTATIC HYPERPLASIA WITH NOCTURIA: ICD-10-CM

## 2022-02-23 DIAGNOSIS — E78.2 MIXED HYPERLIPIDEMIA: ICD-10-CM

## 2022-02-23 DIAGNOSIS — R35.1 BENIGN PROSTATIC HYPERPLASIA WITH NOCTURIA: ICD-10-CM

## 2022-02-23 DIAGNOSIS — J30.9 ALLERGIC RHINITIS, UNSPECIFIED SEASONALITY, UNSPECIFIED TRIGGER: ICD-10-CM

## 2022-02-23 DIAGNOSIS — E11.65 CONTROLLED TYPE 2 DIABETES MELLITUS WITH HYPERGLYCEMIA, WITH LONG-TERM CURRENT USE OF INSULIN (HCC): Primary | ICD-10-CM

## 2022-02-23 LAB
CHP ED QC CHECK: NORMAL
ESTIMATED AVERAGE GLUCOSE: 188.6 MG/DL
GLUCOSE BLD-MCNC: 250 MG/DL
HBA1C MFR BLD: 8.2 %

## 2022-02-23 PROCEDURE — 3052F HG A1C>EQUAL 8.0%<EQUAL 9.0%: CPT | Performed by: INTERNAL MEDICINE

## 2022-02-23 PROCEDURE — 99214 OFFICE O/P EST MOD 30 MIN: CPT | Performed by: INTERNAL MEDICINE

## 2022-02-23 PROCEDURE — 82962 GLUCOSE BLOOD TEST: CPT | Performed by: INTERNAL MEDICINE

## 2022-02-23 RX ORDER — TAMSULOSIN HYDROCHLORIDE 0.4 MG/1
0.4 CAPSULE ORAL DAILY
Qty: 30 CAPSULE | Refills: 3 | Status: SHIPPED | OUTPATIENT
Start: 2022-02-23 | End: 2022-06-15

## 2022-02-23 RX ORDER — ACETAMINOPHEN 160 MG
2000 TABLET,DISINTEGRATING ORAL DAILY
Qty: 30 CAPSULE | Refills: 3 | Status: SHIPPED | OUTPATIENT
Start: 2022-02-23 | End: 2022-09-29 | Stop reason: SDUPTHER

## 2022-02-23 RX ORDER — METHOCARBAMOL 750 MG/1
TABLET, FILM COATED ORAL
Qty: 60 TABLET | Refills: 3 | Status: SHIPPED | OUTPATIENT
Start: 2022-02-23 | End: 2022-06-22

## 2022-02-23 RX ORDER — LISINOPRIL 10 MG/1
10 TABLET ORAL DAILY
Qty: 30 TABLET | Refills: 3 | Status: SHIPPED | OUTPATIENT
Start: 2022-02-23 | End: 2022-07-13

## 2022-02-23 RX ORDER — EMPAGLIFLOZIN 25 MG/1
1 TABLET, FILM COATED ORAL DAILY
Qty: 30 TABLET | Refills: 3 | Status: SHIPPED | OUTPATIENT
Start: 2022-02-23 | End: 2022-03-17

## 2022-02-23 RX ORDER — INSULIN GLARGINE 100 [IU]/ML
45 INJECTION, SOLUTION SUBCUTANEOUS NIGHTLY
Qty: 15 ML | Refills: 3 | Status: SHIPPED | OUTPATIENT
Start: 2022-02-23 | End: 2022-09-07

## 2022-02-23 RX ORDER — ATORVASTATIN CALCIUM 40 MG/1
TABLET, FILM COATED ORAL
Qty: 30 TABLET | Refills: 3 | Status: SHIPPED | OUTPATIENT
Start: 2022-02-23 | End: 2022-02-23 | Stop reason: SDUPTHER

## 2022-02-23 RX ORDER — ASPIRIN 81 MG/1
81 TABLET, CHEWABLE ORAL DAILY
Qty: 30 TABLET | Refills: 3 | Status: SHIPPED | OUTPATIENT
Start: 2022-02-23 | End: 2022-06-15

## 2022-02-23 RX ORDER — NAPROXEN 375 MG/1
375 TABLET ORAL 2 TIMES DAILY WITH MEALS
Qty: 60 TABLET | Refills: 3 | Status: SHIPPED | OUTPATIENT
Start: 2022-02-23 | End: 2022-07-20

## 2022-02-23 RX ORDER — LORATADINE 10 MG/1
10 TABLET ORAL DAILY
Qty: 30 TABLET | Refills: 3 | Status: SHIPPED | OUTPATIENT
Start: 2022-02-23 | End: 2022-09-29 | Stop reason: SDUPTHER

## 2022-02-23 RX ORDER — ATORVASTATIN CALCIUM 40 MG/1
40 TABLET, FILM COATED ORAL DAILY
Qty: 30 TABLET | Refills: 3 | Status: SHIPPED | OUTPATIENT
Start: 2022-02-23 | End: 2022-06-22

## 2022-02-23 SDOH — ECONOMIC STABILITY: FOOD INSECURITY: WITHIN THE PAST 12 MONTHS, YOU WORRIED THAT YOUR FOOD WOULD RUN OUT BEFORE YOU GOT MONEY TO BUY MORE.: NEVER TRUE

## 2022-02-23 SDOH — ECONOMIC STABILITY: FOOD INSECURITY: WITHIN THE PAST 12 MONTHS, THE FOOD YOU BOUGHT JUST DIDN'T LAST AND YOU DIDN'T HAVE MONEY TO GET MORE.: NEVER TRUE

## 2022-02-23 ASSESSMENT — SOCIAL DETERMINANTS OF HEALTH (SDOH): HOW HARD IS IT FOR YOU TO PAY FOR THE VERY BASICS LIKE FOOD, HOUSING, MEDICAL CARE, AND HEATING?: NOT HARD AT ALL

## 2022-02-23 NOTE — PROGRESS NOTES
Name: Bishop Day  [id number]  Age: 64 y.o. YOB: 1960  Sex: male    CHIEF COMPLAINT:    Chief Complaint   Patient presents with    Hypertension    Diabetes    Other     other chronic conditions       HISTORY OF PRESENT ILLNESS:     This is a pleasant  64 y.o. male  is seen today for management of chronic medical problems and medications refills. Previous records reviewed . Doing OK . C/O pain right shoulder on and off but better recently. He sees Barbara Mejia periodically for it and had an injection in the shoulder before. Taking naprosyn  and Robaxin as needed now. His lower back pain is better. No more chest pains. Stress test on 3/15/2021 was OK. Denies SOB. No fever , sore throat or cough or congestion. Denies any abdominal pain. Denies any gastritis symptoms. Taking Prilosec regularly. Appetite OK. Bowels moving [address] C/O frequent urination at night  around 4-5 times a night. Not taking Flomax now. Hearing is ok. Vision Ok with glasses. Denies  any significant skin lesions. Denies any significant depression or anxiety. Blood sugars are usually@ 130- 150 mostly. Labs from yesterday 2/22/2022 reviewed and explained to the patient  He has been vaccinated for COVID-19 x2.   Advised to get a booster dose    Past Medical History:    Patient Active Problem List   Diagnosis    Essential hypertension    Hepatitis C    Colitis    Mixed hyperlipidemia    Vitamin D deficiency    Pulmonary nodules    Hilar adenopathy    Family history of lung cancer    Peyronie disease    Gastroesophageal reflux disease without esophagitis    Precordial pain    Hx of cholecystectomy    Status post laparoscopic cholecystectomy    Dorsalgia    Dyspnea on exertion    Controlled type 2 diabetes mellitus with hyperglycemia, with long-term current use of insulin (HCC)    Benign prostatic hyperplasia with nocturia    Allergic rhinitis    Irritable bowel syndrome with diarrhea    Chest pain, atypical    Rash        Past Surgical History:        Procedure Laterality Date    CHOLECYSTECTOMY, LAPAROSCOPIC N/A 8/16/2020    CHOLECYSTECTOMY LAPAROSCOPIC performed by Edmund Wasserman MD at [address]  3-    LAPAROSCOPIC APPENDECTOMY  7/12/2013       Social History:   Social History     Tobacco Use    Smoking status: Never Smoker    Smokeless tobacco: Never Used   Substance Use Topics    Alcohol use: Yes     Comment: drinks alc once a week / 1 cup tea daily        Family History:       Problem Relation Age of Onset    Heart Surgery Mother     Heart Surgery Father        Allergies:  Lansoprazole    Current Medications :      Prior to Admission medications    Medication Sig Start Date End Date Taking?  Authorizing Provider   methocarbamol (ROBAXIN) 750 MG tablet TAKE 1 TABLET BY MOUTH TWICE A DAY 2/23/22  Yes Braden Cardona MD   aspirin (ASPIRIN LOW DOSE) 81 MG chewable tablet Take 1 tablet by mouth daily 2/23/22  Yes Braden Cardona MD   atorvastatin (LIPITOR) 40 MG tablet Take 1 tablet by mouth daily 2/23/22  Yes Braden Cardona MD   Cholecalciferol (VITAMIN D3) 50 MCG (2000 UT) CAPS Take 1 capsule by mouth daily 2/23/22  Yes Braden Cardona MD   empagliflozin (JARDIANCE) 25 MG tablet Take 1 tablet by mouth daily 2/23/22  Yes Braden Cardona MD   insulin glargine (LANTUS SOLOSTAR) 100 UNIT/ML injection pen Inject 45 Units into the skin nightly 2/23/22  Yes Braden Cardona MD   lisinopril (PRINIVIL;ZESTRIL) 10 MG tablet Take 1 tablet by mouth daily 2/23/22  Yes Braden Cardona MD   loratadine (CLARITIN) 10 MG tablet Take 1 tablet by mouth daily 2/23/22  Yes Braden Cardona MD   metFORMIN (GLUCOPHAGE) 1000 MG tablet Take 1 tablet by mouth daily (with breakfast) 2/23/22  Yes Braden Cardona MD   metoprolol tartrate (LOPRESSOR) 25 MG tablet Take 1 tablet by mouth 2 times daily 2/23/22  Yes Braden Cardona MD   naproxen (NAPROSYN) 375 MG tablet Take 1 tablet by mouth 2 times daily (with meals) 2/23/22  Yes Loren Robison MD   tamsulosin [facility]) 0.4 MG capsule Take 1 capsule by mouth daily 2/23/22  Yes Loren Robison MD   omeprazole (PRILOSEC) 40 MG delayed release capsule TAKE ONE CAPSULE BY MOUTH DAILY 8/11/21  Yes Loren Robison MD   nitroGLYCERIN (NITROSTAT) 0.4 MG SL tablet Place 1 tablet under the tongue every 5 minutes as needed for Chest pain up to max of 3 total doses. If no relief after 1 dose, call 911. 3/3/21  Yes Loren Robison MD   dicyclomine (BENTYL) 10 MG capsule Take 1 capsule by mouth 4 times daily 3/3/21  Yes Loren Robison MD   BD PEN NEEDLE CLARENCE U/F 32G X 4 MM MISC USE 1 PEN NEEDLE DAILY AS DIRECTED 3/1/21  Yes Loren Robison MD       LAB DATA: Reviewed. REVIEW OF SYSTEMS:   see HPI/ Comprehensive review of systems negative except for the ones mentioned in HPI. PHYSICAL EXAMINATION:   /79 (Site: Left Upper Arm, Position: Sitting, Cuff Size: Medium Adult)   Pulse 65   Temp 97.8 °F (36.6 °C)   Ht 6' (1.829 m)   Wt 240 lb (108.9 kg)   SpO2 98%   BMI 32.55 kg/m²    GENERAL APPEARANCE:      Alert, oriented x 3, well developed, cooperative, not in any distress, appears stated age. HEAD:                         Normocephalic, atraumatic   EYES:                          PERRLA, EOMI, lids normal, conjuctivea clear, sclera anicteric. NECK:                         Supple, symmetrical,  trachea midline, no thyromegaly, no JVD, no lymphadenopathy. LUNGS:                       Clear to auscultation bilaterally, respirations unlabored, accessory muscles are not used. HEART:                       Regular rate and rhythm, S1 and S2 normal, no murmur, rub or gallop. PMI in MCL. ABDOMEN:                 Soft, non-tender, bowel sounds are normoactive, no masses, no hepatospleenomegaly. EXTREMITY:              no bipedal edema, mild tenderness in his right shoulder NEURO:                      Alert, oriented to person, place and time.                                       Grossly intact. Musculoskeletal:         No kyphosis or scoliosis, no deformity in any extremity noted, muscle strength and tone are normal.  Skin:                            Warm and dry. No rash or obvious suspicious lesions. PSYCH:                       Mood euthymic, insight and judgement good. ASSESSMENT/PLAN:    1. Controlled type 2 diabetes mellitus with hyperglycemia, with long-term current use of insulin (HCC)  Advised low sugar diet and exercise and weight loss. Continue Glucophage, Lantus and Jardiance  - POCT Glucose  - empagliflozin (JARDIANCE) 25 MG tablet; Take 1 tablet by mouth daily  Dispense: 30 tablet; Refill: 3  - insulin glargine (LANTUS SOLOSTAR) 100 UNIT/ML injection pen; Inject 45 Units into the skin nightly  Dispense: 15 mL; Refill: 3  - metFORMIN (GLUCOPHAGE) 1000 MG tablet; Take 1 tablet by mouth daily (with breakfast)  Dispense: 30 tablet; Refill: 3    2. Essential hypertension  Stable,Continue current medications, denies side effect with medicationss. Low salt diet and exercise advised. Continue lisinopril  - lisinopril (PRINIVIL;ZESTRIL) 10 MG tablet; Take 1 tablet by mouth daily  Dispense: 30 tablet; Refill: 3    3. Mixed hyperlipidemia  Patient is taking cholesterol medications regularly. Denies any side effects. Diet and exercise advised. Continue Lipitor  - atorvastatin (LIPITOR) 40 MG tablet; Take 1 tablet by mouth daily  Dispense: 30 tablet; Refill: 3    4. Benign prostatic hyperplasia with nocturia  Restart Flomax. If symptoms persist will refer to urologist.  - tamsulosin (FLOMAX) 0.4 MG capsule; Take 1 capsule by mouth daily  Dispense: 30 capsule; Refill: 3    5. Gastroesophageal reflux disease without esophagitis  Continue Prilosec    6. Vitamin D deficiency  Advised take vitamin D3 2000 units daily  - Cholecalciferol (VITAMIN D3) 50 MCG (2000 UT) CAPS; Take 1 capsule by mouth daily  Dispense: 30 capsule; Refill: 3    7.  Irritable bowel syndrome with diarrhea  On Bentyl as needed    8. Allergic rhinitis, unspecified seasonality, unspecified trigger  Continue Claritin as needed  - loratadine (CLARITIN) 10 MG tablet; Take 1 tablet by mouth daily  Dispense: 30 tablet; Refill: 3    9. Acute pain of right shoulder  Continue naproxen and Tylenol and Robaxin as needed  - naproxen (NAPROSYN) 375 MG tablet; Take 1 tablet by mouth 2 times daily (with meals)  Dispense: 60 tablet; Refill: 3    Advised to continue to follow COVID-19 precautions    Care discussed with patient. Questions answered and patient verbalizes understanding and agrees with plan. Medications reviewed and reconciled. Continue current medications. Appropriate prescriptions are ordered. Risks and benefits of meds are discussed. After visit summary provided. Advised to call for any problems, questions, or concerns. If symptoms worsen or don't improve as expected, to call us or go to ER. Follow up as directed, sooner if needed. Return in about 3 months (around 5/23/2022). This dictation was performed with a verbal recognition program and it was checked for errors. It is possible that there are still dictated errors within this office note. Any errors should be brought immediately to my attention for correction. All efforts were made to ensure that this office note is accurate.      Isaiah Urbina MD MD

## 2022-03-17 DIAGNOSIS — Z79.4 CONTROLLED TYPE 2 DIABETES MELLITUS WITH HYPERGLYCEMIA, WITH LONG-TERM CURRENT USE OF INSULIN (HCC): ICD-10-CM

## 2022-03-17 DIAGNOSIS — E11.65 CONTROLLED TYPE 2 DIABETES MELLITUS WITH HYPERGLYCEMIA, WITH LONG-TERM CURRENT USE OF INSULIN (HCC): ICD-10-CM

## 2022-03-17 PROCEDURE — 3052F HG A1C>EQUAL 8.0%<EQUAL 9.0%: CPT | Performed by: INTERNAL MEDICINE

## 2022-03-17 RX ORDER — EMPAGLIFLOZIN 25 MG/1
1 TABLET, FILM COATED ORAL DAILY
Qty: 30 TABLET | Refills: 5 | Status: SHIPPED | OUTPATIENT
Start: 2022-03-17 | End: 2022-09-29 | Stop reason: SDUPTHER

## 2022-04-19 RX ORDER — PEN NEEDLE, DIABETIC 32GX 5/32"
NEEDLE, DISPOSABLE MISCELLANEOUS
Qty: 1 EACH | Refills: 3 | Status: SHIPPED | OUTPATIENT
Start: 2022-04-19

## 2022-05-03 DIAGNOSIS — K21.9 GASTROESOPHAGEAL REFLUX DISEASE WITHOUT ESOPHAGITIS: ICD-10-CM

## 2022-05-03 RX ORDER — OMEPRAZOLE 40 MG/1
CAPSULE, DELAYED RELEASE ORAL
Qty: 30 CAPSULE | Refills: 3 | Status: SHIPPED | OUTPATIENT
Start: 2022-05-03 | End: 2022-08-11

## 2022-05-14 DIAGNOSIS — K58.0 IRRITABLE BOWEL SYNDROME WITH DIARRHEA: ICD-10-CM

## 2022-05-16 RX ORDER — DICYCLOMINE HYDROCHLORIDE 10 MG/1
10 CAPSULE ORAL 4 TIMES DAILY
Qty: 120 CAPSULE | Refills: 5 | Status: SHIPPED | OUTPATIENT
Start: 2022-05-16 | End: 2022-09-29 | Stop reason: SDUPTHER

## 2022-05-26 ENCOUNTER — HOSPITAL ENCOUNTER (OUTPATIENT)
Dept: GENERAL RADIOLOGY | Age: 62
Discharge: HOME | End: 2022-05-26
Payer: COMMERCIAL

## 2022-05-26 ENCOUNTER — OFFICE VISIT (OUTPATIENT)
Dept: INTERNAL MEDICINE CLINIC | Age: 62
End: 2022-05-26
Payer: COMMERCIAL

## 2022-05-26 ENCOUNTER — HOSPITAL ENCOUNTER (OUTPATIENT)
Age: 62
Discharge: HOME | End: 2022-05-26
Payer: COMMERCIAL

## 2022-05-26 VITALS
HEIGHT: 72 IN | OXYGEN SATURATION: 95 % | SYSTOLIC BLOOD PRESSURE: 124 MMHG | HEART RATE: 61 BPM | DIASTOLIC BLOOD PRESSURE: 78 MMHG | WEIGHT: 242 LBS | BODY MASS INDEX: 32.78 KG/M2

## 2022-05-26 DIAGNOSIS — N40.1 BENIGN PROSTATIC HYPERPLASIA WITH NOCTURIA: ICD-10-CM

## 2022-05-26 DIAGNOSIS — R35.1 BENIGN PROSTATIC HYPERPLASIA WITH NOCTURIA: ICD-10-CM

## 2022-05-26 DIAGNOSIS — M25.552 LEFT HIP PAIN: ICD-10-CM

## 2022-05-26 DIAGNOSIS — E78.2 MIXED HYPERLIPIDEMIA: ICD-10-CM

## 2022-05-26 DIAGNOSIS — Z79.4 CONTROLLED TYPE 2 DIABETES MELLITUS WITH HYPERGLYCEMIA, WITH LONG-TERM CURRENT USE OF INSULIN (HCC): Primary | ICD-10-CM

## 2022-05-26 DIAGNOSIS — I10 ESSENTIAL HYPERTENSION: ICD-10-CM

## 2022-05-26 DIAGNOSIS — E11.65 CONTROLLED TYPE 2 DIABETES MELLITUS WITH HYPERGLYCEMIA, WITH LONG-TERM CURRENT USE OF INSULIN (HCC): Primary | ICD-10-CM

## 2022-05-26 DIAGNOSIS — E55.9 VITAMIN D DEFICIENCY: ICD-10-CM

## 2022-05-26 DIAGNOSIS — K58.0 IRRITABLE BOWEL SYNDROME WITH DIARRHEA: ICD-10-CM

## 2022-05-26 DIAGNOSIS — K21.9 GASTROESOPHAGEAL REFLUX DISEASE WITHOUT ESOPHAGITIS: ICD-10-CM

## 2022-05-26 LAB
CHP ED QC CHECK: NORMAL
GLUCOSE BLD-MCNC: 150 MG/DL

## 2022-05-26 PROCEDURE — 73502 X-RAY EXAM HIP UNI 2-3 VIEWS: CPT

## 2022-05-26 PROCEDURE — 82962 GLUCOSE BLOOD TEST: CPT | Performed by: INTERNAL MEDICINE

## 2022-05-26 PROCEDURE — 3052F HG A1C>EQUAL 8.0%<EQUAL 9.0%: CPT | Performed by: INTERNAL MEDICINE

## 2022-05-26 PROCEDURE — 99214 OFFICE O/P EST MOD 30 MIN: CPT | Performed by: INTERNAL MEDICINE

## 2022-05-26 ASSESSMENT — PATIENT HEALTH QUESTIONNAIRE - PHQ9
2. FEELING DOWN, DEPRESSED OR HOPELESS: 0
SUM OF ALL RESPONSES TO PHQ QUESTIONS 1-9: 0
1. LITTLE INTEREST OR PLEASURE IN DOING THINGS: 0
SUM OF ALL RESPONSES TO PHQ QUESTIONS 1-9: 0
SUM OF ALL RESPONSES TO PHQ QUESTIONS 1-9: 0
SUM OF ALL RESPONSES TO PHQ9 QUESTIONS 1 & 2: 0
SUM OF ALL RESPONSES TO PHQ QUESTIONS 1-9: 0

## 2022-05-26 NOTE — PROGRESS NOTES
Name: Arva Heimlich  [id number]  Age: 58 y.o. YOB: 1960  Sex: male    CHIEF COMPLAINT:    Chief Complaint   Patient presents with    Diabetes    Hypertension    Other     other chornic conditions       HISTORY OF PRESENT ILLNESS:     This is a pleasant  58 y.o. male  is seen today for management of chronic medical problems and medications refills. Previous records reviewed . Doing OK . Right shoulder pain is better. But he complains of left hip pain and left leg gives away at times. He wants to see Dr. Reggie Nunez for his left hip pain.     Taking naprosyn  and Robaxin as needed now. His lower back pain is better. No more chest pains. Stress test on 3/15/2021 was OK.     Denies SOB. No fever , sore throat or cough or congestion. Denies any abdominal pain.   Denies any gastritis symptoms. Taking Prilosec regularly. Appetite OK. Bowels moving [facility]. C/O frequent urination at night  around 4-5 times a night. Not taking Flomax now. Hearing is ok. Vision Ok with glasses. Denies  any significant skin lesions. Denies any significant depression or anxiety. Blood sugars are usually@ 130- 150 mostly but read simply he is not checking his sugars often. Labs from yesterday 2/22/2022 reviewed and explained to the patient  He has been fully vaccinated for COVID-19 including the booster dose in November 5, 2021.     Past Medical History:    Patient Active Problem List   Diagnosis    Essential hypertension    Hepatitis C    Colitis    Mixed hyperlipidemia    Vitamin D deficiency    Pulmonary nodules    Hilar adenopathy    Family history of lung cancer    Peyronie disease    Gastroesophageal reflux disease without esophagitis    Precordial pain    Hx of cholecystectomy    Status post laparoscopic cholecystectomy    Dorsalgia    Dyspnea on exertion    Controlled type 2 diabetes mellitus with hyperglycemia, with long-term current use of insulin (HCC)    Benign prostatic hyperplasia with nocturia    Allergic rhinitis    Irritable bowel syndrome with diarrhea    Chest pain, atypical    Rash    Left hip pain        Past Surgical History:        Procedure Laterality Date    CHOLECYSTECTOMY, LAPAROSCOPIC N/A 8/16/2020    CHOLECYSTECTOMY LAPAROSCOPIC performed by Garett Couch MD at [address] [place] [address] Rd  3-    LAPAROSCOPIC APPENDECTOMY  7/12/2013       Social History:   Social History     Tobacco Use    Smoking status: Never Smoker    Smokeless tobacco: Never Used   Substance Use Topics    Alcohol use: Yes     Comment: drinks alc once a week / 1 cup tea daily        Family History:       Problem Relation Age of Onset    Heart Surgery Mother     Heart Surgery Father        Allergies:  Lansoprazole    Current Medications :      Prior to Admission medications    Medication Sig Start Date End Date Taking?  Authorizing Provider   dicyclomine (BENTYL) 10 MG capsule TAKE 1 CAPSULE BY MOUTH 4 TIMES DAILY 5/16/22  Yes Kely Bush MD   omeprazole (PRILOSEC) 40 MG delayed release capsule TAKE ONE CAPSULE BY MOUTH DAILY 5/3/22  Yes Kely Bush MD   BD PEN NEEDLE CLARENCE U/F 32G X 4 MM MISC USE 1 PEN NEEDLE DAILY AS DIRECTED 4/19/22  Yes Kely Bush MD   JARDIANCE 25 MG tablet TAKE 1 TABLET BY MOUTH DAILY 3/17/22  Yes Kely Bush MD   methocarbamol (ROBAXIN) 750 MG tablet TAKE 1 TABLET BY MOUTH TWICE A DAY 2/23/22  Yes Kely Bush MD   aspirin (ASPIRIN LOW DOSE) 81 MG chewable tablet Take 1 tablet by mouth daily 2/23/22  Yes Kely Bush MD   atorvastatin (LIPITOR) 40 MG tablet Take 1 tablet by mouth daily 2/23/22  Yes Kely Bush MD   Cholecalciferol (VITAMIN D3) 50 MCG (2000 UT) CAPS Take 1 capsule by mouth daily 2/23/22  Yes Kely Bush MD   insulin glargine (LANTUS SOLOSTAR) 100 UNIT/ML injection pen Inject 45 Units into the skin nightly 2/23/22  Yes Kely Bush MD   lisinopril (PRINIVIL;ZESTRIL) 10 MG tablet Take 1 tablet by mouth daily 2/23/22  Yes Kely Bush MD loratadine (CLARITIN) 10 MG tablet Take 1 tablet by mouth daily 2/23/22  Yes Estella Saenz MD   metFORMIN (GLUCOPHAGE) 1000 MG tablet Take 1 tablet by mouth daily (with breakfast) 2/23/22  Yes Estella Saenz MD   metoprolol tartrate (LOPRESSOR) 25 MG tablet Take 1 tablet by mouth 2 times daily 2/23/22  Yes Estella Saenz MD   naproxen (NAPROSYN) 375 MG tablet Take 1 tablet by mouth 2 times daily (with meals) 2/23/22  Yes Estella Saenz MD   tamsulosin [facility]) 0.4 MG capsule Take 1 capsule by mouth daily 2/23/22  Yes Estella Saenz MD   nitroGLYCERIN (NITROSTAT) 0.4 MG SL tablet Place 1 tablet under the tongue every 5 minutes as needed for Chest pain up to max of 3 total doses. If no relief after 1 dose, call 911. 3/3/21  Yes Estella Saenz MD       LAB DATA: Reviewed. REVIEW OF SYSTEMS:   see HPI/ Comprehensive review of systems negative except for the ones mentioned in HPI. PHYSICAL EXAMINATION:   /78 (Site: Left Upper Arm, Position: Sitting, Cuff Size: Medium Adult)   Pulse 61   Ht 6' (1.829 m)   Wt 242 lb (109.8 kg)   SpO2 95%   BMI 32.82 kg/m²      GENERAL APPEARANCE:      Alert, oriented x 3, well developed, cooperative, not in any distress, appears stated age. HEAD:                         Normocephalic, atraumatic   EYES:                          PERRLA, EOMI, lids normal, conjuctivea clear, sclera anicteric. NECK:                         Supple, symmetrical,  trachea midline, no thyromegaly, no JVD, no lymphadenopathy.    LUNGS:                       Clear to auscultation bilaterally, respirations unlabored, accessory muscles are not used. HEART:                       Regular rate and rhythm, S1 and S2 normal, no murmur, rub or gallop. PMI in MCL. ABDOMEN:                 Soft, non-tender, bowel sounds are normoactive, no masses, no hepatospleenomegaly. EXTREMITY:              no bipedal edema, mild tenderness in his left hip.   NEURO:                      Alert, oriented to person, place and time.                                      Grossly intact. Musculoskeletal:         No kyphosis or scoliosis, no deformity in any extremity noted, muscle strength and tone are normal.  Skin:                            Warm and dry. No rash or obvious suspicious lesions.                       PSYCH:                       Mood euthymic, insight and judgement good. ASSESSMENT/PLAN:    1. Controlled type 2 diabetes mellitus with hyperglycemia, with long-term current use of insulin (HCC)  Continue Glucophage, Lantus and Jardiance. Advised low sugar diet and exercise and weight loss. Advised to check his sugars more often. - POCT Glucose    2. Essential hypertension  Stable,Continue current medications, denies side effect with medicationss. Low salt diet and exercise advised. Continue lisinopril    3. Mixed hyperlipidemia  Patient is taking cholesterol medications regularly. Denies any side effects. Diet and exercise advised. Continue Lipitor    4. Gastroesophageal reflux disease without esophagitis  Patient on Prilosec    5. Irritable bowel syndrome with diarrhea  Not bothering him much. Bentyl as needed. 6. Benign prostatic hyperplasia with nocturia  Continue Flomax    7. Vitamin D deficiency  Continue vitamin D3    8. Left hip pain  Continue Tylenol and naproxen as needed. Refer to Dr. Natacha Allan. - XR HIP LEFT (2-3 VIEWS); Future  - [address] No James Carbone DO, Orthopedic Surgery, [place] discussed with patient. Questions answered and patient verbalizes understanding and agrees with plan. Medications reviewed and reconciled. Continue current medications. Appropriate prescriptions are ordered. Risks and benefits of meds are discussed. After visit summary provided. Advised to call for any problems, questions, or concerns. If symptoms worsen or don't improve as expected, to call us or go to ER. Follow up as directed, sooner if needed.       Return in about 3 months (around 8/26/2022). This dictation was performed with a verbal recognition program and it was checked for errors. It is possible that there are still dictated errors within this office note. Any errors should be brought immediately to my attention for correction. All efforts were made to ensure that this office note is accurate.      Brock Linder MD MD

## 2022-06-15 ENCOUNTER — OFFICE VISIT (OUTPATIENT)
Dept: ORTHOPEDIC SURGERY | Age: 62
End: 2022-06-15
Payer: COMMERCIAL

## 2022-06-15 VITALS
DIASTOLIC BLOOD PRESSURE: 76 MMHG | WEIGHT: 251 LBS | OXYGEN SATURATION: 96 % | RESPIRATION RATE: 16 BRPM | BODY MASS INDEX: 34 KG/M2 | HEIGHT: 72 IN | HEART RATE: 71 BPM | SYSTOLIC BLOOD PRESSURE: 120 MMHG

## 2022-06-15 DIAGNOSIS — M54.50 LUMBAR BACK PAIN: Primary | ICD-10-CM

## 2022-06-15 DIAGNOSIS — R35.1 BENIGN PROSTATIC HYPERPLASIA WITH NOCTURIA: ICD-10-CM

## 2022-06-15 DIAGNOSIS — N40.1 BENIGN PROSTATIC HYPERPLASIA WITH NOCTURIA: ICD-10-CM

## 2022-06-15 DIAGNOSIS — E11.65 CONTROLLED TYPE 2 DIABETES MELLITUS WITH HYPERGLYCEMIA, WITH LONG-TERM CURRENT USE OF INSULIN (HCC): ICD-10-CM

## 2022-06-15 DIAGNOSIS — Z79.4 CONTROLLED TYPE 2 DIABETES MELLITUS WITH HYPERGLYCEMIA, WITH LONG-TERM CURRENT USE OF INSULIN (HCC): ICD-10-CM

## 2022-06-15 PROCEDURE — 99213 OFFICE O/P EST LOW 20 MIN: CPT | Performed by: PHYSICIAN ASSISTANT

## 2022-06-15 RX ORDER — ASPIRIN 81 MG
TABLET,CHEWABLE ORAL
Qty: 30 TABLET | Refills: 3 | Status: SHIPPED | OUTPATIENT
Start: 2022-06-15 | End: 2022-09-29 | Stop reason: SDUPTHER

## 2022-06-15 RX ORDER — TAMSULOSIN HYDROCHLORIDE 0.4 MG/1
0.4 CAPSULE ORAL DAILY
Qty: 30 CAPSULE | Refills: 3 | Status: SHIPPED | OUTPATIENT
Start: 2022-06-15 | End: 2022-09-29 | Stop reason: SDUPTHER

## 2022-06-15 NOTE — PATIENT INSTRUCTIONS
Recommend back exercises 3 times a week at home  Use over-the-counter brace as needed  Follow-up as needed    Patient Education        Low Back Pain: Exercises  Introduction  Here are some examples of exercises for you to try. The exercises may be suggested for a condition or for rehabilitation. Start each exercise slowly. Ease off the exercises if you start to have pain. You will be told when to start these exercises and which ones will work bestfor you. How to do the exercises  Press-up    1. Lie on your stomach, supporting your body with your forearms. 2. Press your elbows down into the floor to raise your upper back. As you do this, relax your stomach muscles and allow your back to arch without using your back muscles. As your press up, do not let your hips or pelvis come off the floor. 3. Hold for 15 to 30 seconds, then relax. 4. Repeat 2 to 4 times. Alternate arm and leg (bird dog) exercise    Do this exercise slowly. Try to keep your body straight at all times, and donot let one hip drop lower than the other. 1. Start on the floor, on your hands and knees. 2. Tighten your belly muscles. 3. Raise one leg off the floor, and hold it straight out behind you. Be careful not to let your hip drop down, because that will twist your trunk. 4. Hold for about 6 seconds, then lower your leg and switch to the other leg. 5. Repeat 8 to 12 times on each leg. 6. Over time, work up to holding for 10 to 30 seconds each time. 7. If you feel stable and secure with your leg raised, try raising the opposite arm straight out in front of you at the same time. Knee-to-chest exercise    1. Lie on your back with your knees bent and your feet flat on the floor. 2. Bring one knee to your chest, keeping the other foot flat on the floor (or keeping the other leg straight, whichever feels better on your lower back). 3. Keep your lower back pressed to the floor. Hold for at least 15 to 30 seconds.   4. Relax, and lower the knee to the starting position. 5. Repeat with the other leg. Repeat 2 to 4 times with each leg. 6. To get more stretch, put your other leg flat on the floor while pulling your knee to your chest.  Curl-ups    1. Lie on the floor on your back with your knees bent at a 90-degree angle. Your feet should be flat on the floor, about 12 inches from your buttocks. 2. Cross your arms over your chest. If this bothers your neck, try putting your hands behind your neck (not your head), with your elbows spread apart. 3. Slowly tighten your belly muscles and raise your shoulder blades off the floor. 4. Keep your head in line with your body, and do not press your chin to your chest.  5. Hold this position for 1 or 2 seconds, then slowly lower yourself back down to the floor. 6. Repeat 8 to 12 times. Pelvic tilt exercise    1. Lie on your back with your knees bent. 2. \"Brace\" your stomach. This means to tighten your muscles by pulling in and imagining your belly button moving toward your spine. You should feel like your back is pressing to the floor and your hips and pelvis are rocking back. 3. Hold for about 6 seconds while you breathe smoothly. 4. Repeat 8 to 12 times. Heel dig bridging    1. Lie on your back with both knees bent and your ankles bent so that only your heels are digging into the floor. Your knees should be bent about 90 degrees. 2. Then push your heels into the floor, squeeze your buttocks, and lift your hips off the floor until your shoulders, hips, and knees are all in a straight line. 3. Hold for about 6 seconds as you continue to breathe normally, and then slowly lower your hips back down to the floor and rest for up to 10 seconds. 4. Do 8 to 12 repetitions. Hamstring stretch in doorway    1. Lie on your back in a doorway, with one leg through the open door. 2. Slide your leg up the wall to straighten your knee. You should feel a gentle stretch down the back of your leg.   3. Hold the stretch for at

## 2022-06-21 DIAGNOSIS — E78.2 MIXED HYPERLIPIDEMIA: ICD-10-CM

## 2022-06-21 DIAGNOSIS — M54.9 DORSALGIA: ICD-10-CM

## 2022-06-22 RX ORDER — ATORVASTATIN CALCIUM 40 MG/1
TABLET, FILM COATED ORAL
Qty: 30 TABLET | Refills: 3 | Status: SHIPPED | OUTPATIENT
Start: 2022-06-22 | End: 2022-09-29 | Stop reason: SDUPTHER

## 2022-06-22 RX ORDER — METHOCARBAMOL 750 MG/1
TABLET, FILM COATED ORAL
Qty: 60 TABLET | Refills: 3 | Status: SHIPPED | OUTPATIENT
Start: 2022-06-22 | End: 2022-09-29 | Stop reason: SDUPTHER

## 2022-06-27 ASSESSMENT — ENCOUNTER SYMPTOMS
EYES NEGATIVE: 1
GASTROINTESTINAL NEGATIVE: 1
RESPIRATORY NEGATIVE: 1
BACK PAIN: 1

## 2022-06-27 NOTE — PROGRESS NOTES
Review of Systems   Constitutional: Negative. HENT: Negative. Eyes: Negative. Respiratory: Negative. Cardiovascular: Negative. Gastrointestinal: Negative. Genitourinary: Negative. Musculoskeletal: Positive for arthralgias and back pain. Skin: Negative. Neurological: Negative. Psychiatric/Behavioral: Negative. Preston Sandoval is a 58 y.o. male that presents the office today complaining of left-sided posterior buttock pain that he thought was coming from his hip. He denies any injuries. He states this is been going on for months. He does have pain when walking but he does not have pain into the groin. He rates his pain at a 5/10. 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      Past Medical History:   Diagnosis Date    Colitis     Diabetes mellitus ([facility] [address]     DJD (degenerative joint disease) of lumbar spine 12/21/2006    MRI Lumbar spine    Dyspnea on exertion 02/09/2021    External hemorrhoids     Family history of lung cancer 01/16/2017    Gallstones 12/15/2009    CT thorax    GERD (gastroesophageal reflux disease)     H/O cardiovascular stress test 03/17/2014    3/14-WNL. EF 62%. Exercise capacity 12.8 METS.  Hepatitis C     Hilar adenopathy 01/16/2017    History of Doppler venous legs 03/15/2021    Significant reflux noted in the Left GSV at the level of mid thigh (1.2s)and knee (2.1s).  History of stress test 03/15/2021    ECG portion of stress test is negative for ischemia by diagnostic criteria. Normal EF 47 % with normal ventricular contractility. No infarct or ischemia    Hx of echocardiogram 03/15/2021    55-60% Mild left ventricular hypertrophyGrade I diastolic dysfunction. Mildly dilated left atrium    Hypertension     Lipoma 03/13/2015    submucosal    Multiple lung nodules 01/16/2017    Polyp, sigmoid colon 01/05/2010    hyperplastic    Pulmonary nodules 12/16/2009    followed by  [facility]    Sleep apnea, obstructive 02/2000    nasal CPAP 8cm    Vertigo        Past Surgical History:   Procedure Laterality Date    CHOLECYSTECTOMY, LAPAROSCOPIC N/A 8/16/2020    CHOLECYSTECTOMY LAPAROSCOPIC performed by Perri Raygoza MD at [place] [id number]  3-    LAPAROSCOPIC APPENDECTOMY  7/12/2013       Family History   Problem Relation Age of Onset    Heart Surgery Mother     Heart Surgery Father        Social History     Socioeconomic History    Marital status:      Spouse name: None    Number of children: None    Years of education: None    Highest education level: None   Occupational History    None   Tobacco Use    Smoking status: Never Smoker    Smokeless tobacco: Never Used   Substance and Sexual Activity    Alcohol use: Yes     Comment: drinks alc once a week / 1 cup tea daily     Drug use: No    Sexual activity: Yes     Partners: Female   Other Topics Concern    None   Social History Narrative    None     Social Determinants of Health     Financial Resource Strain: Low Risk     Difficulty of Paying Living Expenses: Not hard at all   Food Insecurity: No Food Insecurity    Worried About Running Out of Food in the Last Year: Never true    Caleb of Food in the Last Year: Never true   Transportation Needs:     Lack of Transportation (Medical): Not on file    Lack of Transportation (Non-Medical):  Not on file   Physical Activity:     Days of Exercise per Week: Not on file    Minutes of Exercise per Session: Not on file   Stress:     Feeling of Stress : Not on file   Social Connections:     Frequency of Communication with Friends and Family: Not on file    Frequency of Social Gatherings with Friends and Family: Not on file    Attends Faith Services: Not on file    Active Member of Clubs or Organizations: Not on file    Attends Club or Organization Meetings: Not on file    Marital Status: Not on file   Intimate Partner Violence:     Fear of Current or Ex-Partner: Not on file    Emotionally Abused: Not on file    Physically Abused: Not on file    Sexually Abused: Not on file   Housing Stability:     Unable to Pay for Housing in the Last Year: Not on file    Number of Places Lived in the Last Year: Not on file    Unstable Housing in the Last Year: Not on file       Current Outpatient Medications   Medication Sig Dispense Refill    dicyclomine (BENTYL) 10 MG capsule TAKE 1 CAPSULE BY MOUTH 4 TIMES DAILY 120 capsule 5    omeprazole (PRILOSEC) 40 MG delayed release capsule TAKE ONE CAPSULE BY MOUTH DAILY 30 capsule 3    BD PEN NEEDLE CLARENCE U/F 32G X 4 MM MISC USE 1 PEN NEEDLE DAILY AS DIRECTED 1 each 3    JARDIANCE 25 MG tablet TAKE 1 TABLET BY MOUTH DAILY 30 tablet 5    Cholecalciferol (VITAMIN D3) 50 MCG (2000 UT) CAPS Take 1 capsule by mouth daily 30 capsule 3    insulin glargine (LANTUS SOLOSTAR) 100 UNIT/ML injection pen Inject 45 Units into the skin nightly 15 mL 3    lisinopril (PRINIVIL;ZESTRIL) 10 MG tablet Take 1 tablet by mouth daily 30 tablet 3    loratadine (CLARITIN) 10 MG tablet Take 1 tablet by mouth daily 30 tablet 3    naproxen (NAPROSYN) 375 MG tablet Take 1 tablet by mouth 2 times daily (with meals) 60 tablet 3    nitroGLYCERIN (NITROSTAT) 0.4 MG SL tablet Place 1 tablet under the tongue every 5 minutes as needed for Chest pain up to max of 3 total doses.  If no relief after 1 dose, call 911. 25 tablet 3    methocarbamol (ROBAXIN) 750 MG tablet TAKE 1 TABLET BY MOUTH TWICE A DAY 60 tablet 3    metoprolol tartrate (LOPRESSOR) 25 MG tablet TAKE 1 TABLET BY MOUTH 2 TIMES DAILY 60 tablet 10    atorvastatin (LIPITOR) 40 MG tablet TAKE 1 TABLET BY MOUTH NIGHTLY 30 tablet 3    tamsulosin (FLOMAX) 0.4 MG capsule TAKE 1 CAPSULE BY MOUTH DAILY 30 capsule 3    metFORMIN (GLUCOPHAGE) 1000 MG tablet TAKE 1 TABLET BY MOUTH DAILY (WITH BREAKFAST) 30 tablet 3    ASPIRIN LOW DOSE 81 MG chewable tablet CHEW 1 TABLET BY MOUTH DAILY 30 tablet 3 No current facility-administered medications for this visit. Allergies   Allergen Reactions    Lansoprazole      diarrhea  cramping       Review of Systems:  See above      Physical Exam:   /76 (Site: Right Upper Arm, Position: Sitting, Cuff Size: Medium Adult)   Pulse 71   Resp 16   Ht 6' (1.829 m)   Wt 251 lb (113.9 kg)   SpO2 96%   BMI 34.04 kg/m²        Gait is Antalgic. Gen/Psych:Examination reveals a pleasant individual in no acute distress. The patient is oriented to time, place and person. The patient's mood and affect are appropriate. Patient appears well nourished. Body habitus is overweight     Lymph:  no lymphedema in bilateral lower extremities     Skin intact in bilateral lower extremities with no ulcerations, lesions, rash, erythema. Vascular: There are no varicosities in bilateral lower extremities, sensation intact to light touch over bilateral lower extremities. Left hip:  100 degrees hip flexion, 45 degrees external rotation, 30 degrees internal rotation, 45 degrees abduction. Hip strength is 5/5 abduction, 5/5 adduction, 5/5 hip flexion. Patient has no pain with active or passive internal rotation of the hip.  he does have pain in the lower lumbar and lumbosacral area      Outsiderecord review: X-rays reviewed    Imaging studies:  I did review his hip x-rays from 5/26/2022 which showed minimal degenerative change in bilateral hips. Impression:     Diagnosis Orders   1. Lumbar back pain             Plan:    Patient Instructions   Recommend back exercises 3 times a week at home  Use over-the-counter brace as needed  Follow-up as needed    Patient Education        Low Back Pain: Exercises  Introduction  Here are some examples of exercises for you to try. The exercises may be suggested for a condition or for rehabilitation. Start each exercise slowly. Ease off the exercises if you start to have pain.   You will be told when to start these exercises and which ones will work bestfor you. How to do the exercises  Press-up    1. Lie on your stomach, supporting your body with your forearms. 2. Press your elbows down into the floor to raise your upper back. As you do this, relax your stomach muscles and allow your back to arch without using your back muscles. As your press up, do not let your hips or pelvis come off the floor. 3. Hold for 15 to 30 seconds, then relax. 4. Repeat 2 to 4 times. Alternate arm and leg (bird dog) exercise    Do this exercise slowly. Try to keep your body straight at all times, and donot let one hip drop lower than the other. 1. Start on the floor, on your hands and knees. 2. Tighten your belly muscles. 3. Raise one leg off the floor, and hold it straight out behind you. Be careful not to let your hip drop down, because that will twist your trunk. 4. Hold for about 6 seconds, then lower your leg and switch to the other leg. 5. Repeat 8 to 12 times on each leg. 6. Over time, work up to holding for 10 to 30 seconds each time. 7. If you feel stable and secure with your leg raised, try raising the opposite arm straight out in front of you at the same time. Knee-to-chest exercise    1. Lie on your back with your knees bent and your feet flat on the floor. 2. Bring one knee to your chest, keeping the other foot flat on the floor (or keeping the other leg straight, whichever feels better on your lower back). 3. Keep your lower back pressed to the floor. Hold for at least 15 to 30 seconds. 4. Relax, and lower the knee to the starting position. 5. Repeat with the other leg. Repeat 2 to 4 times with each leg. 6. To get more stretch, put your other leg flat on the floor while pulling your knee to your chest.  Curl-ups    1. Lie on the floor on your back with your knees bent at a 90-degree angle. Your feet should be flat on the floor, about 12 inches from your buttocks.   2. Cross your arms over your chest. If this bothers your neck, try putting your hands behind your neck (not your head), with your elbows spread apart. 3. Slowly tighten your belly muscles and raise your shoulder blades off the floor. 4. Keep your head in line with your body, and do not press your chin to your chest.  5. Hold this position for 1 or 2 seconds, then slowly lower yourself back down to the floor. 6. Repeat 8 to 12 times. Pelvic tilt exercise    1. Lie on your back with your knees bent. 2. \"Brace\" your stomach. This means to tighten your muscles by pulling in and imagining your belly button moving toward your spine. You should feel like your back is pressing to the floor and your hips and pelvis are rocking back. 3. Hold for about 6 seconds while you breathe smoothly. 4. Repeat 8 to 12 times. Heel dig bridging    1. Lie on your back with both knees bent and your ankles bent so that only your heels are digging into the floor. Your knees should be bent about 90 degrees. 2. Then push your heels into the floor, squeeze your buttocks, and lift your hips off the floor until your shoulders, hips, and knees are all in a straight line. 3. Hold for about 6 seconds as you continue to breathe normally, and then slowly lower your hips back down to the floor and rest for up to 10 seconds. 4. Do 8 to 12 repetitions. Hamstring stretch in doorway    1. Lie on your back in a doorway, with one leg through the open door. 2. Slide your leg up the wall to straighten your knee. You should feel a gentle stretch down the back of your leg. 3. Hold the stretch for at least 15 to 30 seconds. Do not arch your back, point your toes, or bend either knee. Keep one heel touching the floor and the other heel touching the wall. 4. Repeat with your other leg. 5. Do 2 to 4 times for each leg. Hip flexor stretch    1. Kneel on the floor with one knee bent and one leg behind you. Place your forward knee over your foot. Keep your other knee touching the floor.   2. Slowly push your hips forward until you feel a stretch in the upper thigh of your rear leg. 3. Hold the stretch for at least 15 to 30 seconds. Repeat with your other leg. 4. Do 2 to 4 times on each side. Wall sit    1. Stand with your back 10 to 12 inches away from a wall. 2. Lean into the wall until your back is flat against it. 3. Slowly slide down until your knees are slightly bent, pressing your lower back into the wall. 4. Hold for about 6 seconds, then slide back up the wall. 5. Repeat 8 to 12 times. Follow-up care is a key part of your treatment and safety. Be sure to make and go to all appointments, and call your doctor if you are having problems. It's also a good idea to know your test results and keep alist of the medicines you take. Where can you learn more? Go to https://[email or web address]. org and sign in to your [employer] account. Enter Q677 in the [employer] box to learn more about \"Low Back Pain: Exercises. \"     If you do not have an account, please click on the \"Sign Up Now\" link. Current as of: July 1, 2021               Content Version: 13.2  © 2006-2022 [employer]wise, Inc[employer]. Care instructions adapted under license by [facility] ([facility]). If you have questions about a medical condition or this instruction, always ask your healthcare professional. [address]  any warranty or liability for your use of this information.

## 2022-07-13 DIAGNOSIS — I10 ESSENTIAL HYPERTENSION: ICD-10-CM

## 2022-07-13 RX ORDER — LISINOPRIL 10 MG/1
10 TABLET ORAL DAILY
Qty: 30 TABLET | Refills: 3 | Status: SHIPPED | OUTPATIENT
Start: 2022-07-13 | End: 2022-09-29 | Stop reason: SDUPTHER

## 2022-07-20 DIAGNOSIS — M25.511 ACUTE PAIN OF RIGHT SHOULDER: ICD-10-CM

## 2022-07-20 RX ORDER — NAPROXEN 375 MG/1
TABLET ORAL
Qty: 60 TABLET | Refills: 5 | Status: SHIPPED | OUTPATIENT
Start: 2022-07-20 | End: 2022-09-29 | Stop reason: SDUPTHER

## 2022-08-11 DIAGNOSIS — K21.9 GASTROESOPHAGEAL REFLUX DISEASE WITHOUT ESOPHAGITIS: ICD-10-CM

## 2022-08-11 RX ORDER — OMEPRAZOLE 40 MG/1
CAPSULE, DELAYED RELEASE ORAL
Qty: 30 CAPSULE | Refills: 3 | Status: SHIPPED | OUTPATIENT
Start: 2022-08-11 | End: 2022-09-29 | Stop reason: SDUPTHER

## 2022-09-07 DIAGNOSIS — E11.65 CONTROLLED TYPE 2 DIABETES MELLITUS WITH HYPERGLYCEMIA, WITH LONG-TERM CURRENT USE OF INSULIN (HCC): ICD-10-CM

## 2022-09-07 DIAGNOSIS — Z79.4 CONTROLLED TYPE 2 DIABETES MELLITUS WITH HYPERGLYCEMIA, WITH LONG-TERM CURRENT USE OF INSULIN (HCC): ICD-10-CM

## 2022-09-07 RX ORDER — INSULIN GLARGINE 100 [IU]/ML
45 INJECTION, SOLUTION SUBCUTANEOUS NIGHTLY
Qty: 15 ML | Refills: 10 | Status: SHIPPED | OUTPATIENT
Start: 2022-09-07 | End: 2022-09-29 | Stop reason: SDUPTHER

## 2022-09-29 ENCOUNTER — OFFICE VISIT (OUTPATIENT)
Dept: INTERNAL MEDICINE CLINIC | Age: 62
End: 2022-09-29
Payer: COMMERCIAL

## 2022-09-29 VITALS
HEART RATE: 76 BPM | DIASTOLIC BLOOD PRESSURE: 76 MMHG | BODY MASS INDEX: 32.51 KG/M2 | SYSTOLIC BLOOD PRESSURE: 118 MMHG | OXYGEN SATURATION: 97 % | WEIGHT: 240 LBS | HEIGHT: 72 IN

## 2022-09-29 DIAGNOSIS — E11.65 TYPE 2 DIABETES MELLITUS WITH HYPERGLYCEMIA, WITHOUT LONG-TERM CURRENT USE OF INSULIN (HCC): Primary | ICD-10-CM

## 2022-09-29 DIAGNOSIS — K58.0 IRRITABLE BOWEL SYNDROME WITH DIARRHEA: ICD-10-CM

## 2022-09-29 DIAGNOSIS — N40.1 BENIGN PROSTATIC HYPERPLASIA WITH NOCTURIA: ICD-10-CM

## 2022-09-29 DIAGNOSIS — K21.9 GASTROESOPHAGEAL REFLUX DISEASE WITHOUT ESOPHAGITIS: ICD-10-CM

## 2022-09-29 DIAGNOSIS — J30.9 ALLERGIC RHINITIS, UNSPECIFIED SEASONALITY, UNSPECIFIED TRIGGER: ICD-10-CM

## 2022-09-29 DIAGNOSIS — M25.511 ACUTE PAIN OF RIGHT SHOULDER: ICD-10-CM

## 2022-09-29 DIAGNOSIS — R35.1 BENIGN PROSTATIC HYPERPLASIA WITH NOCTURIA: ICD-10-CM

## 2022-09-29 DIAGNOSIS — M54.9 DORSALGIA: ICD-10-CM

## 2022-09-29 DIAGNOSIS — E55.9 VITAMIN D DEFICIENCY: ICD-10-CM

## 2022-09-29 DIAGNOSIS — R91.8 MULTIPLE NODULES OF LUNG: ICD-10-CM

## 2022-09-29 DIAGNOSIS — R07.9 CHEST PAIN, UNSPECIFIED TYPE: ICD-10-CM

## 2022-09-29 DIAGNOSIS — I10 ESSENTIAL HYPERTENSION: ICD-10-CM

## 2022-09-29 DIAGNOSIS — E78.2 MIXED HYPERLIPIDEMIA: ICD-10-CM

## 2022-09-29 LAB
CHP ED QC CHECK: NORMAL
GLUCOSE BLD-MCNC: 246 MG/DL

## 2022-09-29 PROCEDURE — 3052F HG A1C>EQUAL 8.0%<EQUAL 9.0%: CPT | Performed by: INTERNAL MEDICINE

## 2022-09-29 PROCEDURE — 99214 OFFICE O/P EST MOD 30 MIN: CPT | Performed by: INTERNAL MEDICINE

## 2022-09-29 PROCEDURE — 82962 GLUCOSE BLOOD TEST: CPT | Performed by: INTERNAL MEDICINE

## 2022-09-29 RX ORDER — INSULIN GLARGINE 100 [IU]/ML
45 INJECTION, SOLUTION SUBCUTANEOUS NIGHTLY
Qty: 15 ML | Refills: 3 | Status: SHIPPED | OUTPATIENT
Start: 2022-09-29

## 2022-09-29 RX ORDER — ASPIRIN 81 MG/1
TABLET, CHEWABLE ORAL
Qty: 30 TABLET | Refills: 3 | Status: SHIPPED | OUTPATIENT
Start: 2022-09-29

## 2022-09-29 RX ORDER — DICYCLOMINE HYDROCHLORIDE 10 MG/1
10 CAPSULE ORAL 4 TIMES DAILY
Qty: 120 CAPSULE | Refills: 3 | Status: SHIPPED | OUTPATIENT
Start: 2022-09-29

## 2022-09-29 RX ORDER — ACETAMINOPHEN 160 MG
2000 TABLET,DISINTEGRATING ORAL DAILY
Qty: 30 CAPSULE | Refills: 3 | Status: SHIPPED | OUTPATIENT
Start: 2022-09-29

## 2022-09-29 RX ORDER — ATORVASTATIN CALCIUM 40 MG/1
TABLET, FILM COATED ORAL
Qty: 30 TABLET | Refills: 3 | Status: SHIPPED | OUTPATIENT
Start: 2022-09-29

## 2022-09-29 RX ORDER — METHOCARBAMOL 750 MG/1
TABLET, FILM COATED ORAL
Qty: 60 TABLET | Refills: 3 | Status: SHIPPED | OUTPATIENT
Start: 2022-09-29

## 2022-09-29 RX ORDER — TAMSULOSIN HYDROCHLORIDE 0.4 MG/1
0.4 CAPSULE ORAL DAILY
Qty: 30 CAPSULE | Refills: 3 | Status: SHIPPED | OUTPATIENT
Start: 2022-09-29

## 2022-09-29 RX ORDER — OMEPRAZOLE 40 MG/1
40 CAPSULE, DELAYED RELEASE ORAL DAILY
Qty: 30 CAPSULE | Refills: 3 | Status: SHIPPED | OUTPATIENT
Start: 2022-09-29

## 2022-09-29 RX ORDER — NAPROXEN 375 MG/1
TABLET ORAL
Qty: 60 TABLET | Refills: 3 | Status: SHIPPED | OUTPATIENT
Start: 2022-09-29

## 2022-09-29 RX ORDER — NITROGLYCERIN 0.4 MG/1
0.4 TABLET SUBLINGUAL EVERY 5 MIN PRN
Qty: 25 TABLET | Refills: 3 | Status: SHIPPED | OUTPATIENT
Start: 2022-09-29

## 2022-09-29 RX ORDER — LISINOPRIL 10 MG/1
10 TABLET ORAL DAILY
Qty: 30 TABLET | Refills: 3 | Status: SHIPPED | OUTPATIENT
Start: 2022-09-29

## 2022-09-29 RX ORDER — LORATADINE 10 MG/1
10 TABLET ORAL DAILY
Qty: 30 TABLET | Refills: 3 | Status: SHIPPED | OUTPATIENT
Start: 2022-09-29

## 2022-09-29 NOTE — PROGRESS NOTES
Name: Sneha Gaona  [id number]  Age: 58 y.o. YOB: 1960  Sex: male    CHIEF COMPLAINT:    Chief Complaint   Patient presents with    Diabetes    Hypertension    Other     Other chronic conditions         HISTORY OF PRESENT ILLNESS:     This is a pleasant  58 y.o. male  is seen today for management of chronic medical problems and medications refills. Previous records reviewed . Doing OK . Right shoulder pain is better. But he complains of left hip pain and left leg gives away at times. He did see KAELA Villeda of Dr. Haroon Strong and he gave him exercises for back pain and back pain is slightly better with it. Taking naprosyn  and Robaxin as needed now. No more chest pains. Stress test on 3/15/2021 was OK. Did not see his cardiologist since March 2021. Does not use nitroglycerin sublingual but want to keep it on hand. Denies SOB. No fever , sore throat or cough or congestion. Denies any abdominal pain. Denies any gastritis symptoms. Taking Prilosec regularly. Appetite OK. Bowels moving [facility]. Urinary symptoms are better with Flomax. Hearing is ok. Vision Ok with glasses. Denies  any significant skin lesions. Denies any significant depression or anxiety. Blood sugars are usually@ 130- 150 mostly but recently he is not checking his sugars often. Labs from 2/22/2022 reviewed and explained to the patient  He has been vaccinated for COVID-19 including the booster dose x1   He refused to have flu shot.     Past Medical History:    Patient Active Problem List   Diagnosis    Essential hypertension    Hepatitis C    Colitis    Mixed hyperlipidemia    Vitamin D deficiency    Pulmonary nodules    Hilar adenopathy    Family history of lung cancer    Peyronie disease    Gastroesophageal reflux disease without esophagitis    Precordial pain    Hx of cholecystectomy    Status post laparoscopic cholecystectomy    Dorsalgia    Dyspnea on exertion    Type 2 diabetes mellitus with hyperglycemia, without long-term current use of insulin (HCC)    Benign prostatic hyperplasia with nocturia    Allergic rhinitis    Irritable bowel syndrome with diarrhea    Chest pain    Rash    Left hip pain    Acute pain of right shoulder        Past Surgical History:        Procedure Laterality Date    CHOLECYSTECTOMY, LAPAROSCOPIC N/A 8/16/2020    CHOLECYSTECTOMY LAPAROSCOPIC performed by Michael Novak MD at [address]  3-    LAPAROSCOPIC APPENDECTOMY  7/12/2013       Social History:   Social History     Tobacco Use    Smoking status: Never    Smokeless tobacco: Never   Substance Use Topics    Alcohol use: Yes     Comment: drinks alc once a week / 1 cup tea daily        Family History:       Problem Relation Age of Onset    Heart Surgery Mother     Heart Surgery Father        Allergies:  Lansoprazole    Current Medications :      Prior to Admission medications    Medication Sig Start Date End Date Taking? Authorizing Provider   nitroGLYCERIN (NITROSTAT) 0.4 MG SL tablet Place 1 tablet under the tongue every 5 minutes as needed for Chest pain up to max of 3 total doses.  If no relief after 1 dose, call 911. 9/29/22  Yes Penny Andrade MD   aspirin (ASPIRIN LOW DOSE) 81 MG chewable tablet CHEW 1 TABLET BY MOUTH DAILY 9/29/22  Yes Penny Andrade MD   atorvastatin (LIPITOR) 40 MG tablet TAKE 1 TABLET BY MOUTH NIGHTLY 9/29/22  Yes Penny Andrade MD   Cholecalciferol (VITAMIN D3) 50 MCG (2000 UT) CAPS Take 1 capsule by mouth daily 9/29/22  Yes Penny Andrade MD   dicyclomine (BENTYL) 10 MG capsule Take 1 capsule by mouth 4 times daily 9/29/22  Yes Penny Andrade MD   empagliflozin (JARDIANCE) 25 MG tablet Take 1 tablet by mouth daily 9/29/22  Yes Penny Andrade MD   insulin glargine (LANTUS SOLOSTAR) 100 UNIT/ML injection pen Inject 45 Units into the skin nightly 9/29/22  Yes Penny Andrade MD   lisinopril (PRINIVIL;ZESTRIL) 10 MG tablet Take 1 tablet by mouth daily 9/29/22  Yes Penny Andrade MD loratadine (CLARITIN) 10 MG tablet Take 1 tablet by mouth daily 9/29/22  Yes Claudene Kotyk, MD   metFORMIN (GLUCOPHAGE) 1000 MG tablet Take 1 tablet by mouth daily (with breakfast) 9/29/22  Yes Claudene Kotyk, MD   methocarbamol (ROBAXIN) 750 MG tablet TAKE 1 TABLET BY MOUTH TWICE A DAY 9/29/22  Yes Claudene Kotyk, MD   metoprolol tartrate (LOPRESSOR) 25 MG tablet Take 1 tablet by mouth 2 times daily 9/29/22  Yes Claudene Kotyk, MD   naproxen (NAPROSYN) 375 MG tablet TAKE 1 TABLET BY MOUTH TWICE A DAY WITH MEALS 9/29/22  Yes Claudene Kotyk, MD   omeprazole (PRILOSEC) 40 MG delayed release capsule Take 1 capsule by mouth daily 9/29/22  Yes Claudene Kotyk, MD   tamsulosin (FLOMAX) 0.4 MG capsule Take 1 capsule by mouth daily 9/29/22  Yes Claudene Kotyk, MD   BD PEN NEEDLE CLARENCE U/F 32G X 4 MM MISC USE 1 PEN NEEDLE DAILY AS DIRECTED 4/19/22  Yes Claudene Kotyk, MD       LAB DATA: Reviewed. REVIEW OF SYSTEMS:   see HPI/ Comprehensive review of systems negative except for the ones mentioned in HPI. PHYSICAL EXAMINATION:   /76 (Site: Left Upper Arm, Position: Sitting, Cuff Size: Medium Adult)   Pulse 76   Ht 6' (1.829 m)   Wt 240 lb (108.9 kg)   SpO2 97%   BMI 32.55 kg/m²      GENERAL APPEARANCE:      Alert, oriented x 3, well developed, cooperative, not in any distress, appears stated age. HEAD:                         Normocephalic, atraumatic   EYES:                          PERRLA, EOMI, lids normal, conjuctivea clear, sclera anicteric. NECK:                         Supple, symmetrical,  trachea midline, no thyromegaly, no JVD, no lymphadenopathy. LUNGS:                       Clear to auscultation bilaterally, respirations unlabored, accessory muscles are not used. HEART:                       Regular rate and rhythm, S1 and S2 normal, no murmur, rub or gallop. PMI in MCL. ABDOMEN:                 Soft, non-tender, bowel sounds are normoactive, no masses, no hepatospleenomegaly.   EXTREMITY:              no bipedal edema, has varicose veins bilaterally. Mild tenderness in his left hip. NEURO:                      Alert, oriented to person, place and time. Grossly intact. Musculoskeletal:         No kyphosis or scoliosis, no deformity in any extremity noted, muscle strength and tone are normal.  Skin:                            Warm and dry. No rash or obvious suspicious lesions. PSYCH:                       Mood euthymic, insight and judgement good. ASSESSMENT/PLAN:    1. Type 2 diabetes mellitus with hyperglycemia, without long-term current use of insulin ([facility])  Advised low sugar diet and exercise. Continue on current medications. - empagliflozin (JARDIANCE) 25 MG tablet; Take 1 tablet by mouth daily  Dispense: 30 tablet; Refill: 3  - insulin glargine (LANTUS SOLOSTAR) 100 UNIT/ML injection pen; Inject 45 Units into the skin nightly  Dispense: 15 mL; Refill: 3  - metFORMIN (GLUCOPHAGE) 1000 MG tablet; Take 1 tablet by mouth daily (with breakfast)  Dispense: 30 tablet; Refill: 3  - Hemoglobin A1C; Future    2. Essential hypertension  Continue current medications, denies side effect with medicationss. Low salt diet and exercise advised. Continue lisinopril and Lopressor  - lisinopril (PRINIVIL;ZESTRIL) 10 MG tablet; Take 1 tablet by mouth daily  Dispense: 30 tablet; Refill: 3  - metoprolol tartrate (LOPRESSOR) 25 MG tablet; Take 1 tablet by mouth 2 times daily  Dispense: 60 tablet; Refill: 3  - Comprehensive Metabolic Panel; Future  - CBC with Auto Differential; Future    3. Mixed hyperlipidemia  Advised low-cholesterol diet and exercise and continue on Lipitor  - atorvastatin (LIPITOR) 40 MG tablet; TAKE 1 TABLET BY MOUTH NIGHTLY  Dispense: 30 tablet; Refill: 3  - Lipid, Fasting; Future    4. Allergic rhinitis, unspecified seasonality, unspecified trigger  Continue Claritin as needed  - loratadine (CLARITIN) 10 MG tablet;  Take 1 tablet by mouth daily  Dispense: 30 tablet; Refill: 3    5. Chest pain, unspecified type  Atypical chest pain. Improved. Continue aspirin and take nitroglycerin sublingual as needed  - nitroGLYCERIN (NITROSTAT) 0.4 MG SL tablet; Place 1 tablet under the tongue every 5 minutes as needed for Chest pain up to max of 3 total doses. If no relief after 1 dose, call 911. Dispense: 25 tablet; Refill: 3  - aspirin (ASPIRIN LOW DOSE) 81 MG chewable tablet; CHEW 1 TABLET BY MOUTH DAILY  Dispense: 30 tablet; Refill: 3    6. Gastroesophageal reflux disease without esophagitis  Continue Prilosec  - omeprazole (PRILOSEC) 40 MG delayed release capsule; Take 1 capsule by mouth daily  Dispense: 30 capsule; Refill: 3    7. Irritable bowel syndrome with diarrhea  On Bentyl as needed  - dicyclomine (BENTYL) 10 MG capsule; Take 1 capsule by mouth 4 times daily  Dispense: 120 capsule; Refill: 3    8. Dorsalgia  Continue naproxen and Tylenol and Robaxin as needed  - methocarbamol (ROBAXIN) 750 MG tablet; TAKE 1 TABLET BY MOUTH TWICE A DAY  Dispense: 60 tablet; Refill: 3    9. Acute pain of right shoulder  Continue naproxen, Tylenol and Robaxin as needed  - naproxen (NAPROSYN) 375 MG tablet; TAKE 1 TABLET BY MOUTH TWICE A DAY WITH MEALS  Dispense: 60 tablet; Refill: 3    10. Benign prostatic hyperplasia with nocturia  Continue Flomax  - tamsulosin (FLOMAX) 0.4 MG capsule; Take 1 capsule by mouth daily  Dispense: 30 capsule; Refill: 3    11. Vitamin D deficiency  Continue vitamin D3  - Cholecalciferol (VITAMIN D3) 50 MCG (2000 UT) CAPS; Take 1 capsule by mouth daily  Dispense: 30 capsule; Refill: 3    12. Multiple nodules of lung  History of multiple nodules of the lung. Patient is not following any pulmonologist now. He used to see Dr. Arya Phan before who retired recently. Will check CT of the chest and consult Dr. Geoff Curiel. - CT CHEST W CONTRAST;  Future  - Jose Juan Cope MD, Pulmonology, [address] to continue to follow COVID-19 precautions    Care discussed with patient. Questions answered and patient verbalizes understanding and agrees with plan. Medications reviewed and reconciled. Continue current medications. Appropriate prescriptions are ordered. Risks and benefits of meds are discussed. After visit summary provided. Advised to call for any problems, questions, or concerns. If symptoms worsen or don't improve as expected, to call us or go to ER. Follow up as directed, sooner if needed. No follow-ups on file. This dictation was performed with a verbal recognition program and it was checked for errors. It is possible that there are still dictated errors within this office note. Any errors should be brought immediately to my attention for correction. All efforts were made to ensure that this office note is accurate.      Karen Hilliard MD MD

## 2022-10-04 ENCOUNTER — TELEPHONE (OUTPATIENT)
Dept: INTERNAL MEDICINE CLINIC | Age: 62
End: 2022-10-04

## 2022-10-04 NOTE — TELEPHONE ENCOUNTER
PROVIDER FEEDBACK LOOP CALLED 3X     Patient:Mauricio Blue  : 1960  Referring Provider: Lula Pena  Referral Type:  Eval and Treat     Procedures:  SBM48 - AMB REFERRAL TO PULMONOLOGY  Date Service Ordered 2022        We were unable to reach Katelynn Cooley to schedule test ordered by your office after 3 call attempts. Please call your patient to explain significance of ordered test and direct patient to call Central Scheduling to re-schedule test at their earliest convenience. Please complete one of the following actions from Quick Actions buttons:     Route to Provider:  Route message to ordering provider to seek next steps in care plan. Telephone Encounter:  Telephone encounter will open. Call patient to explain significance of ordered test and direct patient to call Central Scheduling to schedule test then Document details of call. Open Referral:  Review referral notes or details if needed. Close Referral:  Referral will open. Document in Notes section of referral why the referral is being closed. Examples of referral closure:  Patient had test done outside of [facility] ([facility]) (list location), Patient refuses test, Patient no longer having symptoms, Unable to reach patient. Only close the referral if you are sure the test will not proceed.      Thank you     Central Scheduling

## 2022-10-10 DIAGNOSIS — R35.1 BENIGN PROSTATIC HYPERPLASIA WITH NOCTURIA: ICD-10-CM

## 2022-10-10 DIAGNOSIS — E78.2 MIXED HYPERLIPIDEMIA: ICD-10-CM

## 2022-10-10 DIAGNOSIS — N40.1 BENIGN PROSTATIC HYPERPLASIA WITH NOCTURIA: ICD-10-CM

## 2022-10-10 DIAGNOSIS — M54.9 DORSALGIA: ICD-10-CM

## 2022-10-10 DIAGNOSIS — R07.9 CHEST PAIN, UNSPECIFIED TYPE: ICD-10-CM

## 2022-10-10 DIAGNOSIS — E11.65 TYPE 2 DIABETES MELLITUS WITH HYPERGLYCEMIA, WITHOUT LONG-TERM CURRENT USE OF INSULIN (HCC): ICD-10-CM

## 2022-10-10 RX ORDER — ATORVASTATIN CALCIUM 40 MG/1
TABLET, FILM COATED ORAL
Qty: 30 TABLET | Refills: 3 | OUTPATIENT
Start: 2022-10-10

## 2022-10-10 RX ORDER — METHOCARBAMOL 750 MG/1
TABLET, FILM COATED ORAL
Qty: 60 TABLET | Refills: 3 | OUTPATIENT
Start: 2022-10-10

## 2022-10-10 RX ORDER — ASPIRIN 81 MG/1
TABLET, CHEWABLE ORAL
Qty: 30 TABLET | Refills: 3 | OUTPATIENT
Start: 2022-10-10

## 2022-10-10 RX ORDER — TAMSULOSIN HYDROCHLORIDE 0.4 MG/1
0.4 CAPSULE ORAL DAILY
Qty: 30 CAPSULE | Refills: 3 | OUTPATIENT
Start: 2022-10-10

## 2022-11-07 DIAGNOSIS — I10 ESSENTIAL HYPERTENSION: ICD-10-CM

## 2022-11-07 RX ORDER — LISINOPRIL 10 MG/1
10 TABLET ORAL DAILY
Qty: 30 TABLET | Refills: 3 | Status: SHIPPED | OUTPATIENT
Start: 2022-11-07

## 2022-12-06 DIAGNOSIS — K21.9 GASTROESOPHAGEAL REFLUX DISEASE WITHOUT ESOPHAGITIS: ICD-10-CM

## 2022-12-06 RX ORDER — OMEPRAZOLE 40 MG/1
40 CAPSULE, DELAYED RELEASE ORAL DAILY
Qty: 30 CAPSULE | Refills: 3 | Status: SHIPPED | OUTPATIENT
Start: 2022-12-06

## 2022-12-08 ENCOUNTER — HOSPITAL ENCOUNTER (OUTPATIENT)
Age: 62
Discharge: HOME | End: 2022-12-08
Payer: COMMERCIAL

## 2022-12-08 DIAGNOSIS — E11.65 TYPE 2 DIABETES MELLITUS WITH HYPERGLYCEMIA, WITHOUT LONG-TERM CURRENT USE OF INSULIN (HCC): ICD-10-CM

## 2022-12-08 DIAGNOSIS — I10 ESSENTIAL HYPERTENSION: ICD-10-CM

## 2022-12-08 DIAGNOSIS — E78.2 MIXED HYPERLIPIDEMIA: ICD-10-CM

## 2022-12-08 LAB
ALBUMIN SERPL-MCNC: 5 GM/DL (ref 3.4–5)
ALP BLD-CCNC: 89 IU/L (ref 40–128)
ALT SERPL-CCNC: 35 U/L (ref 10–40)
ANION GAP SERPL CALCULATED.3IONS-SCNC: 13 MMOL/L (ref 4–16)
AST SERPL-CCNC: 22 IU/L (ref 15–37)
BASOPHILS ABSOLUTE: 0.1 K/CU MM
BASOPHILS RELATIVE PERCENT: 0.6 % (ref 0–1)
BILIRUB SERPL-MCNC: 0.4 MG/DL (ref 0–1)
BUN BLDV-MCNC: 17 MG/DL (ref 6–23)
CALCIUM SERPL-MCNC: 9.4 MG/DL (ref 8.3–10.6)
CHLORIDE BLD-SCNC: 102 MMOL/L (ref 99–110)
CHOLESTEROL, FASTING: 104 MG/DL
CO2: 24 MMOL/L (ref 21–32)
CREAT SERPL-MCNC: 0.7 MG/DL (ref 0.9–1.3)
DIFFERENTIAL TYPE: ABNORMAL
EOSINOPHILS ABSOLUTE: 0.1 K/CU MM
EOSINOPHILS RELATIVE PERCENT: 1.3 % (ref 0–3)
ESTIMATED AVERAGE GLUCOSE: 183 MG/DL
GFR SERPL CREATININE-BSD FRML MDRD: >60 ML/MIN/1.73M2
GLUCOSE BLD-MCNC: 147 MG/DL (ref 70–99)
HBA1C MFR BLD: 8 % (ref 4.2–6.3)
HCT VFR BLD CALC: 50.9 % (ref 42–52)
HDLC SERPL-MCNC: 40 MG/DL
HEMOGLOBIN: 16.8 GM/DL (ref 13.5–18)
IMMATURE NEUTROPHIL %: 0.8 % (ref 0–0.43)
LDL CHOLESTEROL CALCULATED: 47 MG/DL
LYMPHOCYTES ABSOLUTE: 3.7 K/CU MM
LYMPHOCYTES RELATIVE PERCENT: 34.4 % (ref 24–44)
MCH RBC QN AUTO: 31.9 PG (ref 27–31)
MCHC RBC AUTO-ENTMCNC: 33 % (ref 32–36)
MCV RBC AUTO: 96.6 FL (ref 78–100)
MONOCYTES ABSOLUTE: 1 K/CU MM
MONOCYTES RELATIVE PERCENT: 9.4 % (ref 0–4)
NUCLEATED RBC %: 0 %
PDW BLD-RTO: 12.4 % (ref 11.7–14.9)
PLATELET # BLD: 150 K/CU MM (ref 140–440)
PMV BLD AUTO: 9.8 FL (ref 7.5–11.1)
POTASSIUM SERPL-SCNC: 4.5 MMOL/L (ref 3.5–5.1)
RBC # BLD: 5.27 M/CU MM (ref 4.6–6.2)
SEGMENTED NEUTROPHILS ABSOLUTE COUNT: 5.7 K/CU MM
SEGMENTED NEUTROPHILS RELATIVE PERCENT: 53.5 % (ref 36–66)
SODIUM BLD-SCNC: 139 MMOL/L (ref 135–145)
TOTAL IMMATURE NEUTOROPHIL: 0.09 K/CU MM
TOTAL NUCLEATED RBC: 0 K/CU MM
TOTAL PROTEIN: 6.9 GM/DL (ref 6.4–8.2)
TRIGLYCERIDE, FASTING: 83 MG/DL
WBC # BLD: 10.7 K/CU MM (ref 4–10.5)

## 2022-12-08 PROCEDURE — 83036 HEMOGLOBIN GLYCOSYLATED A1C: CPT

## 2022-12-08 PROCEDURE — 36415 COLL VENOUS BLD VENIPUNCTURE: CPT

## 2022-12-08 PROCEDURE — 80061 LIPID PANEL: CPT

## 2022-12-08 PROCEDURE — 85025 COMPLETE CBC W/AUTO DIFF WBC: CPT

## 2022-12-08 PROCEDURE — 80053 COMPREHEN METABOLIC PANEL: CPT

## 2022-12-14 ENCOUNTER — HOSPITAL ENCOUNTER (OUTPATIENT)
Dept: CT IMAGING | Age: 62
Discharge: HOME | End: 2022-12-14
Payer: COMMERCIAL

## 2022-12-14 DIAGNOSIS — R91.8 MULTIPLE NODULES OF LUNG: ICD-10-CM

## 2022-12-14 PROCEDURE — 71260 CT THORAX DX C+: CPT

## 2022-12-14 PROCEDURE — 6360000004 HC RX CONTRAST MEDICATION: Performed by: INTERNAL MEDICINE

## 2022-12-14 RX ORDER — SODIUM CHLORIDE 0.9 % (FLUSH) 0.9 %
5-40 SYRINGE (ML) INJECTION PRN
Status: DISCONTINUED | OUTPATIENT
Start: 2022-12-14 | End: 2022-12-15 | Stop reason: HOSPADM

## 2022-12-14 RX ADMIN — IOPAMIDOL 75 ML: 755 INJECTION, SOLUTION INTRAVENOUS at 15:00

## 2022-12-21 RX ORDER — PEN NEEDLE, DIABETIC 32GX 5/32"
NEEDLE, DISPOSABLE MISCELLANEOUS
Qty: 100 EACH | Refills: 3 | Status: SHIPPED | OUTPATIENT
Start: 2022-12-21

## 2023-01-11 ENCOUNTER — OFFICE VISIT (OUTPATIENT)
Dept: PULMONOLOGY | Age: 63
End: 2023-01-11
Payer: COMMERCIAL

## 2023-01-11 VITALS
HEART RATE: 67 BPM | BODY MASS INDEX: 32.47 KG/M2 | OXYGEN SATURATION: 97 % | HEIGHT: 73 IN | WEIGHT: 245 LBS | SYSTOLIC BLOOD PRESSURE: 118 MMHG | DIASTOLIC BLOOD PRESSURE: 70 MMHG

## 2023-01-11 DIAGNOSIS — R91.8 PULMONARY NODULES: Primary | ICD-10-CM

## 2023-01-11 DIAGNOSIS — R59.0 HILAR ADENOPATHY: ICD-10-CM

## 2023-01-11 DIAGNOSIS — R06.09 DYSPNEA ON EXERTION: ICD-10-CM

## 2023-01-11 DIAGNOSIS — Z80.1 FAMILY HISTORY OF LUNG CANCER: ICD-10-CM

## 2023-01-11 PROCEDURE — 3078F DIAST BP <80 MM HG: CPT | Performed by: INTERNAL MEDICINE

## 2023-01-11 PROCEDURE — 99213 OFFICE O/P EST LOW 20 MIN: CPT | Performed by: INTERNAL MEDICINE

## 2023-01-11 PROCEDURE — 3074F SYST BP LT 130 MM HG: CPT | Performed by: INTERNAL MEDICINE

## 2023-01-11 NOTE — PROGRESS NOTES
SUBJECTIVE:  Chief Complaint: Multiple pulmonary nodules, hilar adenopathy, mild dyspnea on exertion, family history of lung cancer, minimal COPD  Haley Guidry states that he has had no episodes of worsening shortness of breath or chest discomfort. He denies fever, chills, chest congestion or persistent cough. His father was a heavy smoker and did have lung cancer but Haley Guidry has never smoked. He denies COVID-19 infection and also mentions that he has not received the newest COVID-19 hybrid booster vaccination. ROS:  Constitution:  HEENT: Negative for ear, throat pain  Cardiovascular: Negative for chest pain, syncope, edema  Pulmonary: See HPI  Musculoskeletal: Negative for DVT, myalgias, arthralgias    OBJECTIVE:  /70   Pulse 67   Ht 6' 1\" (1.854 m)   Wt 245 lb (111.1 kg)   SpO2 97%   BMI 32.32 kg/m²      Physical Exam:  Constitutional:  He appears well developed and well-nourished. In no respiratory distress at rest  Neck:  Supple, No palpable lymphadenopathy, No JVD  Cardiovascular:  S1, S2 Normal, Regular rhythm, no murmurs or gallops, No pericardial  rubs. Pulmonary: Breath sounds are clear throughout all areas without wheezing or rhonchi  Abdomen: Not examined  Extremities: no edema, No DVT  Neurologic: Oriented x3, No focal deficits    Radiology: CT chest with contrast 12/14/2022 showed stable bilateral pulm nodules some of which demonstrate cavitation and calcification, measuring up to 1.7 cm. There is no hilar adenopathy or mediastinal adenopathy noted  PFT: Office spirometry on 2/9/2021 demonstrated no significant airways obstruction. Following bronchodilators he did have a significant response        ASSESSMENT:    1. Pulmonary nodules    2. Hilar adenopathy    3. Dyspnea on exertion    4. 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   Normally I would not recommend a repeat CT but because of his family history of lung cancer I think it would be wise to repeat his CT in the next 1 to 2 years. [facility] will drop off his routine care and please refer back if necessary at a later date. We have discussed the need to maintain yearly flu immunization, pneumococcal vaccination. We have discussed Coronavirus precaution including handwashing practice, wiping items touched in public such as gas pumps, door handles, shopping carts, etc. Self monitoring for infection - fever, chills, cough, SOB. Should they develop symptoms they should call office for further instructions. No follow-ups on file. This dictation was performed with a verbal recognition program and it was checked for errors. It is possible that there are still dictated errors within this office note. Any errors should be brought immediately to my attention for correction. All efforts were made to ensure that this office note is accurate.

## 2023-02-02 DIAGNOSIS — R35.1 BENIGN PROSTATIC HYPERPLASIA WITH NOCTURIA: ICD-10-CM

## 2023-02-02 DIAGNOSIS — N40.1 BENIGN PROSTATIC HYPERPLASIA WITH NOCTURIA: ICD-10-CM

## 2023-02-02 DIAGNOSIS — R07.9 CHEST PAIN, UNSPECIFIED TYPE: ICD-10-CM

## 2023-02-02 DIAGNOSIS — E11.65 TYPE 2 DIABETES MELLITUS WITH HYPERGLYCEMIA, WITHOUT LONG-TERM CURRENT USE OF INSULIN (HCC): ICD-10-CM

## 2023-02-02 DIAGNOSIS — E78.2 MIXED HYPERLIPIDEMIA: ICD-10-CM

## 2023-02-02 RX ORDER — ATORVASTATIN CALCIUM 40 MG/1
TABLET, FILM COATED ORAL
Qty: 30 TABLET | Refills: 3 | Status: SHIPPED | OUTPATIENT
Start: 2023-02-02

## 2023-02-02 RX ORDER — TAMSULOSIN HYDROCHLORIDE 0.4 MG/1
0.4 CAPSULE ORAL DAILY
Qty: 30 CAPSULE | Refills: 3 | Status: SHIPPED | OUTPATIENT
Start: 2023-02-02

## 2023-02-02 RX ORDER — ASPIRIN 81 MG/1
TABLET, CHEWABLE ORAL
Qty: 30 TABLET | Refills: 3 | Status: SHIPPED | OUTPATIENT
Start: 2023-02-02

## 2023-03-06 DIAGNOSIS — M54.9 DORSALGIA: ICD-10-CM

## 2023-03-06 DIAGNOSIS — K58.0 IRRITABLE BOWEL SYNDROME WITH DIARRHEA: ICD-10-CM

## 2023-03-06 DIAGNOSIS — I10 ESSENTIAL HYPERTENSION: ICD-10-CM

## 2023-03-06 DIAGNOSIS — J30.9 ALLERGIC RHINITIS, UNSPECIFIED SEASONALITY, UNSPECIFIED TRIGGER: ICD-10-CM

## 2023-03-07 RX ORDER — DICYCLOMINE HYDROCHLORIDE 10 MG/1
CAPSULE ORAL
Qty: 120 CAPSULE | Refills: 5 | Status: SHIPPED | OUTPATIENT
Start: 2023-03-07

## 2023-03-07 RX ORDER — METHOCARBAMOL 750 MG/1
TABLET, FILM COATED ORAL
Qty: 60 TABLET | Refills: 3 | Status: SHIPPED | OUTPATIENT
Start: 2023-03-07

## 2023-03-07 RX ORDER — LISINOPRIL 10 MG/1
10 TABLET ORAL DAILY
Qty: 30 TABLET | Refills: 3 | Status: SHIPPED | OUTPATIENT
Start: 2023-03-07

## 2023-03-07 RX ORDER — LORATADINE 10 MG/1
10 TABLET ORAL DAILY
Qty: 30 TABLET | Refills: 3 | Status: SHIPPED | OUTPATIENT
Start: 2023-03-07

## 2023-03-24 ENCOUNTER — OFFICE VISIT (OUTPATIENT)
Dept: INTERNAL MEDICINE CLINIC | Age: 63
End: 2023-03-24
Payer: COMMERCIAL

## 2023-03-24 VITALS
DIASTOLIC BLOOD PRESSURE: 78 MMHG | WEIGHT: 258 LBS | HEART RATE: 61 BPM | HEIGHT: 73 IN | OXYGEN SATURATION: 98 % | SYSTOLIC BLOOD PRESSURE: 124 MMHG | BODY MASS INDEX: 34.19 KG/M2

## 2023-03-24 DIAGNOSIS — I10 ESSENTIAL HYPERTENSION: ICD-10-CM

## 2023-03-24 DIAGNOSIS — R35.1 BENIGN PROSTATIC HYPERPLASIA WITH NOCTURIA: ICD-10-CM

## 2023-03-24 DIAGNOSIS — M54.2 NECK PAIN: ICD-10-CM

## 2023-03-24 DIAGNOSIS — K58.0 IRRITABLE BOWEL SYNDROME WITH DIARRHEA: ICD-10-CM

## 2023-03-24 DIAGNOSIS — E55.9 VITAMIN D DEFICIENCY: ICD-10-CM

## 2023-03-24 DIAGNOSIS — G89.29 CHRONIC RIGHT SHOULDER PAIN: ICD-10-CM

## 2023-03-24 DIAGNOSIS — R07.9 CHEST PAIN, UNSPECIFIED TYPE: ICD-10-CM

## 2023-03-24 DIAGNOSIS — E78.2 MIXED HYPERLIPIDEMIA: ICD-10-CM

## 2023-03-24 DIAGNOSIS — E11.65 TYPE 2 DIABETES MELLITUS WITH HYPERGLYCEMIA, WITHOUT LONG-TERM CURRENT USE OF INSULIN (HCC): ICD-10-CM

## 2023-03-24 DIAGNOSIS — E11.65 TYPE 2 DIABETES MELLITUS WITH HYPERGLYCEMIA, WITHOUT LONG-TERM CURRENT USE OF INSULIN (HCC): Primary | ICD-10-CM

## 2023-03-24 DIAGNOSIS — M25.511 CHRONIC RIGHT SHOULDER PAIN: ICD-10-CM

## 2023-03-24 DIAGNOSIS — J30.9 ALLERGIC RHINITIS, UNSPECIFIED SEASONALITY, UNSPECIFIED TRIGGER: ICD-10-CM

## 2023-03-24 DIAGNOSIS — M54.9 DORSALGIA: ICD-10-CM

## 2023-03-24 DIAGNOSIS — N52.9 ERECTILE DYSFUNCTION, UNSPECIFIED ERECTILE DYSFUNCTION TYPE: ICD-10-CM

## 2023-03-24 DIAGNOSIS — N40.1 BENIGN PROSTATIC HYPERPLASIA WITH NOCTURIA: ICD-10-CM

## 2023-03-24 DIAGNOSIS — R91.8 PULMONARY NODULES: ICD-10-CM

## 2023-03-24 DIAGNOSIS — K21.9 GASTROESOPHAGEAL REFLUX DISEASE WITHOUT ESOPHAGITIS: ICD-10-CM

## 2023-03-24 LAB
CHP ED QC CHECK: NORMAL
GLUCOSE BLD-MCNC: 138 MG/DL
HBA1C MFR BLD: 8.5 %

## 2023-03-24 PROCEDURE — 82962 GLUCOSE BLOOD TEST: CPT | Performed by: INTERNAL MEDICINE

## 2023-03-24 PROCEDURE — 3052F HG A1C>EQUAL 8.0%<EQUAL 9.0%: CPT | Performed by: INTERNAL MEDICINE

## 2023-03-24 PROCEDURE — 3078F DIAST BP <80 MM HG: CPT | Performed by: INTERNAL MEDICINE

## 2023-03-24 PROCEDURE — 3074F SYST BP LT 130 MM HG: CPT | Performed by: INTERNAL MEDICINE

## 2023-03-24 PROCEDURE — 83036 HEMOGLOBIN GLYCOSYLATED A1C: CPT | Performed by: INTERNAL MEDICINE

## 2023-03-24 PROCEDURE — 99214 OFFICE O/P EST MOD 30 MIN: CPT | Performed by: INTERNAL MEDICINE

## 2023-03-24 RX ORDER — INSULIN GLARGINE 100 [IU]/ML
52 INJECTION, SOLUTION SUBCUTANEOUS NIGHTLY
Qty: 15 ML | Refills: 3 | Status: SHIPPED | OUTPATIENT
Start: 2023-03-24 | End: 2023-03-24 | Stop reason: SDUPTHER

## 2023-03-24 RX ORDER — LIDOCAINE 50 MG/G
1 PATCH TOPICAL DAILY
Qty: 30 PATCH | Refills: 3 | Status: SHIPPED | OUTPATIENT
Start: 2023-03-24 | End: 2023-04-23

## 2023-03-24 RX ORDER — INSULIN GLARGINE 100 [IU]/ML
52 INJECTION, SOLUTION SUBCUTANEOUS NIGHTLY
Qty: 15 ADJUSTABLE DOSE PRE-FILLED PEN SYRINGE | Refills: 1 | Status: SHIPPED | OUTPATIENT
Start: 2023-03-24

## 2023-03-24 RX ORDER — NITROGLYCERIN 0.4 MG/1
0.4 TABLET SUBLINGUAL EVERY 5 MIN PRN
Qty: 25 TABLET | Refills: 3 | Status: SHIPPED | OUTPATIENT
Start: 2023-03-24

## 2023-03-24 RX ORDER — GABAPENTIN 300 MG/1
300 CAPSULE ORAL 2 TIMES DAILY
Qty: 60 CAPSULE | Refills: 5 | Status: SHIPPED | OUTPATIENT
Start: 2023-03-24 | End: 2023-09-20

## 2023-03-24 SDOH — ECONOMIC STABILITY: FOOD INSECURITY: WITHIN THE PAST 12 MONTHS, THE FOOD YOU BOUGHT JUST DIDN'T LAST AND YOU DIDN'T HAVE MONEY TO GET MORE.: NEVER TRUE

## 2023-03-24 SDOH — ECONOMIC STABILITY: HOUSING INSECURITY
IN THE LAST 12 MONTHS, WAS THERE A TIME WHEN YOU DID NOT HAVE A STEADY PLACE TO SLEEP OR SLEPT IN A SHELTER (INCLUDING NOW)?: NO

## 2023-03-24 SDOH — ECONOMIC STABILITY: FOOD INSECURITY: WITHIN THE PAST 12 MONTHS, YOU WORRIED THAT YOUR FOOD WOULD RUN OUT BEFORE YOU GOT MONEY TO BUY MORE.: NEVER TRUE

## 2023-03-24 SDOH — ECONOMIC STABILITY: INCOME INSECURITY: HOW HARD IS IT FOR YOU TO PAY FOR THE VERY BASICS LIKE FOOD, HOUSING, MEDICAL CARE, AND HEATING?: NOT HARD AT ALL

## 2023-03-24 ASSESSMENT — PATIENT HEALTH QUESTIONNAIRE - PHQ9
SUM OF ALL RESPONSES TO PHQ QUESTIONS 1-9: 0
1. LITTLE INTEREST OR PLEASURE IN DOING THINGS: 0
2. FEELING DOWN, DEPRESSED OR HOPELESS: 0
SUM OF ALL RESPONSES TO PHQ9 QUESTIONS 1 & 2: 0
SUM OF ALL RESPONSES TO PHQ QUESTIONS 1-9: 0

## 2023-03-24 NOTE — TELEPHONE ENCOUNTER
Pt's wife called informing they need 90 day Rx, due to insurance. (Not for Nitro's, Lidocaine patches, or new Gabapentin Rx).

## 2023-03-24 NOTE — PROGRESS NOTES
symmetrical,  trachea midline, no thyromegaly, no JVD, no lymphadenopathy. LUNGS:                       Clear to auscultation bilaterally, respirations unlabored, accessory muscles are not used. HEART:                       Regular rate and rhythm, S1 and S2 normal, no murmur, rub or gallop. PMI in MCL. ABDOMEN:                 Soft, non-tender, bowel sounds are normoactive, no masses, no hepatospleenomegaly. EXTREMITY:              no bipedal edema, has varicose veins bilaterally. NEURO:                      Alert, oriented to person, place and time. Grossly intact. Musculoskeletal:         No kyphosis or scoliosis, mild tenderness in his lower back and in the back of the neck. Skin:                            Warm and dry. No rash or obvious suspicious lesions. PSYCH:                       Mood euthymic, insight and judgement good. ASSESSMENT/PLAN:    1. Type 2 diabetes mellitus with hyperglycemia, without long-term current use of insulin (HCC)  Continue Glucophage, Jardiance and Lantus but will increase Lantus to 52 units at at bedtime. Advised low sugar diet and exercise and weight loss. - POCT Glucose  - POCT glycosylated hemoglobin (Hb A1C)  - insulin glargine (LANTUS SOLOSTAR) 100 UNIT/ML injection pen; Inject 52 Units into the skin nightly  Dispense: 15 mL; Refill: 3    2. Chest pain, unspecified type  He does not have any more chest pain but wants to keep nitroglycerin at hand. Has not used nitroglycerin for several years  - nitroGLYCERIN (NITROSTAT) 0.4 MG SL tablet; Place 1 tablet under the tongue every 5 minutes as needed for Chest pain up to max of 3 total doses. If no relief after 1 dose, call 911. Dispense: 25 tablet; Refill: 3    3. Essential hypertension  Continue current medications, denies side effect with medicationss. Low salt diet and exercise advised. Continue lisinopril and Lopressor    4.  Mixed

## 2023-04-03 ENCOUNTER — TELEPHONE (OUTPATIENT)
Dept: INTERNAL MEDICINE CLINIC | Age: 63
End: 2023-04-03

## 2023-04-03 DIAGNOSIS — M54.9 DORSALGIA: ICD-10-CM

## 2023-04-03 RX ORDER — GABAPENTIN 300 MG/1
300 CAPSULE ORAL 3 TIMES DAILY
Qty: 90 CAPSULE | Refills: 1 | Status: SHIPPED | OUTPATIENT
Start: 2023-04-03 | End: 2023-09-30

## 2023-04-03 NOTE — TELEPHONE ENCOUNTER
Patient is still having trouble with back pain. He has been using his gabapentin tid not bid. He is going to be out sooner than he should. He was wondering if you could increase the dose or change it to tid. 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 He does use his lidoderm patch at night, takes his naprosyn bid, and methocarbamol bid. He just needs something a little more. He uses [facility].  Please advise

## 2023-04-04 ENCOUNTER — HOSPITAL ENCOUNTER (OUTPATIENT)
Dept: GENERAL RADIOLOGY | Age: 63
Discharge: HOME | End: 2023-04-04
Payer: COMMERCIAL

## 2023-04-04 ENCOUNTER — HOSPITAL ENCOUNTER (OUTPATIENT)
Age: 63
Discharge: HOME | End: 2023-04-04
Payer: COMMERCIAL

## 2023-04-04 DIAGNOSIS — M54.9 DORSALGIA: ICD-10-CM

## 2023-04-04 DIAGNOSIS — M54.2 NECK PAIN: ICD-10-CM

## 2023-04-04 PROCEDURE — 72100 X-RAY EXAM L-S SPINE 2/3 VWS: CPT

## 2023-04-04 PROCEDURE — 72040 X-RAY EXAM NECK SPINE 2-3 VW: CPT

## 2023-04-06 DIAGNOSIS — M54.2 NECK PAIN: ICD-10-CM

## 2023-04-06 DIAGNOSIS — M54.9 DORSALGIA: Primary | ICD-10-CM

## 2023-05-04 DIAGNOSIS — E11.65 TYPE 2 DIABETES MELLITUS WITH HYPERGLYCEMIA, WITHOUT LONG-TERM CURRENT USE OF INSULIN (HCC): ICD-10-CM

## 2023-05-04 DIAGNOSIS — R35.1 BENIGN PROSTATIC HYPERPLASIA WITH NOCTURIA: ICD-10-CM

## 2023-05-04 DIAGNOSIS — N40.1 BENIGN PROSTATIC HYPERPLASIA WITH NOCTURIA: ICD-10-CM

## 2023-05-04 RX ORDER — TAMSULOSIN HYDROCHLORIDE 0.4 MG/1
0.4 CAPSULE ORAL DAILY
Qty: 30 CAPSULE | Refills: 3 | Status: SHIPPED | OUTPATIENT
Start: 2023-05-04

## 2023-05-04 RX ORDER — EMPAGLIFLOZIN 25 MG/1
TABLET, FILM COATED ORAL
Qty: 30 TABLET | Refills: 3 | Status: SHIPPED | OUTPATIENT
Start: 2023-05-04

## 2023-05-09 DIAGNOSIS — K58.0 IRRITABLE BOWEL SYNDROME WITH DIARRHEA: ICD-10-CM

## 2023-05-09 RX ORDER — DICYCLOMINE HYDROCHLORIDE 10 MG/1
10 CAPSULE ORAL 4 TIMES DAILY
Qty: 120 CAPSULE | Refills: 3 | Status: SHIPPED | OUTPATIENT
Start: 2023-05-09

## 2023-05-27 DIAGNOSIS — E11.65 TYPE 2 DIABETES MELLITUS WITH HYPERGLYCEMIA, WITHOUT LONG-TERM CURRENT USE OF INSULIN (HCC): ICD-10-CM

## 2023-05-30 RX ORDER — INSULIN GLARGINE 100 [IU]/ML
52 INJECTION, SOLUTION SUBCUTANEOUS NIGHTLY
Qty: 15 ML | Refills: 1 | Status: SHIPPED | OUTPATIENT
Start: 2023-05-30

## 2023-06-06 DIAGNOSIS — E11.65 TYPE 2 DIABETES MELLITUS WITH HYPERGLYCEMIA, WITHOUT LONG-TERM CURRENT USE OF INSULIN (HCC): ICD-10-CM

## 2023-06-06 DIAGNOSIS — R07.9 CHEST PAIN, UNSPECIFIED TYPE: ICD-10-CM

## 2023-06-06 DIAGNOSIS — E78.2 MIXED HYPERLIPIDEMIA: ICD-10-CM

## 2023-06-06 DIAGNOSIS — M54.9 DORSALGIA: ICD-10-CM

## 2023-06-07 RX ORDER — GABAPENTIN 300 MG/1
CAPSULE ORAL
Qty: 90 CAPSULE | Refills: 1 | Status: SHIPPED | OUTPATIENT
Start: 2023-06-07 | End: 2023-08-06

## 2023-06-07 RX ORDER — ATORVASTATIN CALCIUM 40 MG/1
TABLET, FILM COATED ORAL
Qty: 30 TABLET | Refills: 3 | Status: SHIPPED | OUTPATIENT
Start: 2023-06-07

## 2023-06-07 RX ORDER — ASPIRIN 81 MG/1
TABLET, CHEWABLE ORAL
Qty: 30 TABLET | Refills: 3 | Status: SHIPPED | OUTPATIENT
Start: 2023-06-07

## 2023-07-05 DIAGNOSIS — I10 ESSENTIAL HYPERTENSION: ICD-10-CM

## 2023-07-05 DIAGNOSIS — M54.9 DORSALGIA: ICD-10-CM

## 2023-07-05 DIAGNOSIS — M25.511 ACUTE PAIN OF RIGHT SHOULDER: ICD-10-CM

## 2023-07-05 DIAGNOSIS — J30.9 ALLERGIC RHINITIS, UNSPECIFIED SEASONALITY, UNSPECIFIED TRIGGER: ICD-10-CM

## 2023-07-05 RX ORDER — LORATADINE 10 MG/1
10 TABLET ORAL DAILY
Qty: 30 TABLET | Refills: 3 | OUTPATIENT
Start: 2023-07-05

## 2023-07-05 RX ORDER — NAPROXEN 375 MG/1
TABLET ORAL
Qty: 60 TABLET | Refills: 3 | OUTPATIENT
Start: 2023-07-05

## 2023-07-05 RX ORDER — LISINOPRIL 10 MG/1
10 TABLET ORAL DAILY
Qty: 30 TABLET | Refills: 3 | OUTPATIENT
Start: 2023-07-05

## 2023-07-05 RX ORDER — GABAPENTIN 300 MG/1
CAPSULE ORAL
Qty: 90 CAPSULE | Refills: 1 | OUTPATIENT
Start: 2023-07-05 | End: 2023-09-03

## 2023-07-21 DIAGNOSIS — J30.9 ALLERGIC RHINITIS, UNSPECIFIED SEASONALITY, UNSPECIFIED TRIGGER: ICD-10-CM

## 2023-07-21 DIAGNOSIS — I10 ESSENTIAL HYPERTENSION: ICD-10-CM

## 2023-07-21 DIAGNOSIS — E11.65 TYPE 2 DIABETES MELLITUS WITH HYPERGLYCEMIA, WITHOUT LONG-TERM CURRENT USE OF INSULIN (HCC): ICD-10-CM

## 2023-07-21 DIAGNOSIS — M54.9 DORSALGIA: ICD-10-CM

## 2023-07-21 DIAGNOSIS — K21.9 GASTROESOPHAGEAL REFLUX DISEASE WITHOUT ESOPHAGITIS: ICD-10-CM

## 2023-07-21 DIAGNOSIS — M25.511 ACUTE PAIN OF RIGHT SHOULDER: ICD-10-CM

## 2023-07-21 DIAGNOSIS — E55.9 VITAMIN D DEFICIENCY: ICD-10-CM

## 2023-07-21 RX ORDER — INSULIN GLARGINE 100 [IU]/ML
52 INJECTION, SOLUTION SUBCUTANEOUS NIGHTLY
Qty: 15 ML | Refills: 3 | Status: SHIPPED | OUTPATIENT
Start: 2023-07-21

## 2023-07-21 RX ORDER — LISINOPRIL 10 MG/1
10 TABLET ORAL DAILY
Qty: 30 TABLET | Refills: 3 | Status: SHIPPED | OUTPATIENT
Start: 2023-07-21

## 2023-07-21 RX ORDER — NAPROXEN 375 MG/1
TABLET ORAL
Qty: 60 TABLET | Refills: 3 | Status: SHIPPED | OUTPATIENT
Start: 2023-07-21

## 2023-07-21 RX ORDER — OMEPRAZOLE 40 MG/1
40 CAPSULE, DELAYED RELEASE ORAL DAILY
Qty: 30 CAPSULE | Refills: 3 | Status: SHIPPED | OUTPATIENT
Start: 2023-07-21

## 2023-07-21 RX ORDER — LORATADINE 10 MG/1
10 TABLET ORAL DAILY
Qty: 30 TABLET | Refills: 3 | Status: SHIPPED | OUTPATIENT
Start: 2023-07-21

## 2023-07-21 RX ORDER — ACETAMINOPHEN 160 MG
2000 TABLET,DISINTEGRATING ORAL DAILY
Qty: 30 CAPSULE | Refills: 3 | Status: SHIPPED | OUTPATIENT
Start: 2023-07-21

## 2023-07-31 ENCOUNTER — OFFICE VISIT (OUTPATIENT)
Dept: INTERNAL MEDICINE CLINIC | Age: 63
End: 2023-07-31
Payer: COMMERCIAL

## 2023-07-31 VITALS
OXYGEN SATURATION: 98 % | HEIGHT: 73 IN | DIASTOLIC BLOOD PRESSURE: 74 MMHG | WEIGHT: 259 LBS | BODY MASS INDEX: 34.33 KG/M2 | SYSTOLIC BLOOD PRESSURE: 126 MMHG | HEART RATE: 69 BPM

## 2023-07-31 DIAGNOSIS — J30.9 ALLERGIC RHINITIS, UNSPECIFIED SEASONALITY, UNSPECIFIED TRIGGER: ICD-10-CM

## 2023-07-31 DIAGNOSIS — Z23 NEED FOR SHINGLES VACCINE: ICD-10-CM

## 2023-07-31 DIAGNOSIS — M54.9 DORSALGIA: ICD-10-CM

## 2023-07-31 DIAGNOSIS — G89.29 CHRONIC RIGHT SHOULDER PAIN: ICD-10-CM

## 2023-07-31 DIAGNOSIS — K21.9 GASTROESOPHAGEAL REFLUX DISEASE WITHOUT ESOPHAGITIS: ICD-10-CM

## 2023-07-31 DIAGNOSIS — R07.9 CHEST PAIN, UNSPECIFIED TYPE: ICD-10-CM

## 2023-07-31 DIAGNOSIS — R35.1 BENIGN PROSTATIC HYPERPLASIA WITH NOCTURIA: ICD-10-CM

## 2023-07-31 DIAGNOSIS — E11.65 TYPE 2 DIABETES MELLITUS WITH HYPERGLYCEMIA, WITH LONG-TERM CURRENT USE OF INSULIN (HCC): Primary | ICD-10-CM

## 2023-07-31 DIAGNOSIS — M25.511 CHRONIC RIGHT SHOULDER PAIN: ICD-10-CM

## 2023-07-31 DIAGNOSIS — E55.9 VITAMIN D DEFICIENCY: ICD-10-CM

## 2023-07-31 DIAGNOSIS — M54.2 NECK PAIN: ICD-10-CM

## 2023-07-31 DIAGNOSIS — N40.1 BENIGN PROSTATIC HYPERPLASIA WITH NOCTURIA: ICD-10-CM

## 2023-07-31 DIAGNOSIS — N52.9 ERECTILE DYSFUNCTION, UNSPECIFIED ERECTILE DYSFUNCTION TYPE: ICD-10-CM

## 2023-07-31 DIAGNOSIS — Z79.4 TYPE 2 DIABETES MELLITUS WITH HYPERGLYCEMIA, WITH LONG-TERM CURRENT USE OF INSULIN (HCC): Primary | ICD-10-CM

## 2023-07-31 DIAGNOSIS — I10 ESSENTIAL HYPERTENSION: ICD-10-CM

## 2023-07-31 DIAGNOSIS — E78.2 MIXED HYPERLIPIDEMIA: ICD-10-CM

## 2023-07-31 LAB
BASOPHILS # BLD: 0 K/UL (ref 0–0.2)
BASOPHILS NFR BLD: 0 %
CHP ED QC CHECK: NORMAL
CREAT UR-MCNC: 88 MG/DL (ref 39–259)
DEPRECATED RDW RBC AUTO: 13.2 % (ref 12.4–15.4)
EOSINOPHIL # BLD: 0.1 K/UL (ref 0–0.6)
EOSINOPHIL NFR BLD: 1 %
GLUCOSE BLD-MCNC: 169 MG/DL
HCT VFR BLD AUTO: 44.8 % (ref 40.5–52.5)
HGB BLD-MCNC: 15.3 G/DL (ref 13.5–17.5)
LYMPHOCYTES # BLD: 1.8 K/UL (ref 1–5.1)
LYMPHOCYTES NFR BLD: 20 %
MCH RBC QN AUTO: 32.5 PG (ref 26–34)
MCHC RBC AUTO-ENTMCNC: 34.3 G/DL (ref 31–36)
MCV RBC AUTO: 94.8 FL (ref 80–100)
MICROALBUMIN UR DL<=1MG/L-MCNC: 2.4 MG/DL
MICROALBUMIN/CREAT UR: 27.3 MG/G (ref 0–30)
MONOCYTES # BLD: 0.6 K/UL (ref 0–1.3)
MONOCYTES NFR BLD: 7 %
NEUTROPHILS # BLD: 6.5 K/UL (ref 1.7–7.7)
NEUTROPHILS NFR BLD: 70 %
NEUTS BAND NFR BLD MANUAL: 2 % (ref 0–7)
PLATELET # BLD AUTO: 128 K/UL (ref 135–450)
PLATELET BLD QL SMEAR: ABNORMAL
PMV BLD AUTO: 9.2 FL (ref 5–10.5)
RBC # BLD AUTO: 4.72 M/UL (ref 4.2–5.9)
WBC # BLD AUTO: 9 K/UL (ref 4–11)

## 2023-07-31 PROCEDURE — 3074F SYST BP LT 130 MM HG: CPT | Performed by: INTERNAL MEDICINE

## 2023-07-31 PROCEDURE — 82962 GLUCOSE BLOOD TEST: CPT | Performed by: INTERNAL MEDICINE

## 2023-07-31 PROCEDURE — 3078F DIAST BP <80 MM HG: CPT | Performed by: INTERNAL MEDICINE

## 2023-07-31 PROCEDURE — 3052F HG A1C>EQUAL 8.0%<EQUAL 9.0%: CPT | Performed by: INTERNAL MEDICINE

## 2023-07-31 PROCEDURE — 99214 OFFICE O/P EST MOD 30 MIN: CPT | Performed by: INTERNAL MEDICINE

## 2023-07-31 RX ORDER — ZOSTER VACCINE RECOMBINANT, ADJUVANTED 50 MCG/0.5
0.5 KIT INTRAMUSCULAR SEE ADMIN INSTRUCTIONS
Qty: 0.5 ML | Refills: 0 | Status: SHIPPED | OUTPATIENT
Start: 2023-07-31 | End: 2024-01-27

## 2023-07-31 RX ORDER — TIZANIDINE 4 MG/1
4 TABLET ORAL EVERY 8 HOURS PRN
Qty: 90 TABLET | Refills: 3 | Status: SHIPPED | OUTPATIENT
Start: 2023-07-31

## 2023-07-31 RX ORDER — TIZANIDINE 4 MG/1
4 TABLET ORAL EVERY 8 HOURS PRN
COMMUNITY
End: 2023-07-31 | Stop reason: SDUPTHER

## 2023-07-31 NOTE — PROGRESS NOTES
Name: Addy [place]  [id number]  Age: 61 y.o. YOB: 1960  Sex: male    CHIEF COMPLAINT:    Chief Complaint   Patient presents with    Diabetes    Hypertension    Other     Other chronic conditions         HISTORY OF PRESENT ILLNESS:     This is a pleasant  61 y.o. male  is seen today for management of chronic medical problems and medications refills. Previous records reviewed . Doing OK . Right shoulder pain is better. Hip pain is better. But C/O lower back pain and neck pain    He is seeing pain management doctor and getting injections in his neck and lower back. Neck pain is better but is still has lower back pain. Taking naprosyn  and Robaxin and Tylenol as needed. No more chest pains. Stress test on 3/15/2021 was OK. Did not see his cardiologist since March 2021. Does not use nitroglycerin sublingual but want to keep it on hand. Does not want to see cardiologist at this time. Denies SOB. No fever , sore throat or cough or congestion. Denies any abdominal pain. Denies any gastritis symptoms. Taking Prilosec regularly. Appetite OK. Bowels moving [address] [facility]. Urinary symptoms are better with Flomax but is still he has nocturia, 2-3 times a night  He did see Dr. Root Lisa is ok. Vision Ok with glasses. Denies  any significant skin lesions. Denies any significant depression or anxiety. Blood sugars are usually@ 130- 150 mostly but recently he is not checking his sugars often. Today his sugars are 1 169 and aic @ 8.5 on 3/24/2023. Labs from 2/82022 reviewed and explained to the patient  He has been vaccinated for COVID-19 including the booster dose x1   He refused to have flu shot.       Past Medical History:    Patient Active Problem List   Diagnosis    Essential hypertension    Hepatitis C    Colitis    Mixed hyperlipidemia    Vitamin D deficiency    Pulmonary nodules    Hilar adenopathy    Family history of lung cancer    Peyronie disease    Gastroesophageal

## 2023-08-01 DIAGNOSIS — E87.8 LOW BICARBONATE LEVEL: Primary | ICD-10-CM

## 2023-08-01 LAB
ALBUMIN SERPL-MCNC: 4.7 G/DL (ref 3.4–5)
ALBUMIN/GLOB SERPL: 2.8 {RATIO} (ref 1.1–2.2)
ALP SERPL-CCNC: 85 U/L (ref 40–129)
ALT SERPL-CCNC: 27 U/L (ref 10–40)
ANION GAP SERPL CALCULATED.3IONS-SCNC: 17 MMOL/L (ref 3–16)
AST SERPL-CCNC: 23 U/L (ref 15–37)
BILIRUB SERPL-MCNC: 0.6 MG/DL (ref 0–1)
BUN SERPL-MCNC: 14 MG/DL (ref 7–20)
CALCIUM SERPL-MCNC: 9.4 MG/DL (ref 8.3–10.6)
CHLORIDE SERPL-SCNC: 105 MMOL/L (ref 99–110)
CHOLEST SERPL-MCNC: 90 MG/DL (ref 0–199)
CO2 SERPL-SCNC: 20 MMOL/L (ref 21–32)
CREAT SERPL-MCNC: 0.6 MG/DL (ref 0.8–1.3)
EST. AVERAGE GLUCOSE BLD GHB EST-MCNC: 191.5 MG/DL
GFR SERPLBLD CREATININE-BSD FMLA CKD-EPI: >60 ML/MIN/{1.73_M2}
GLUCOSE SERPL-MCNC: 193 MG/DL (ref 70–99)
HBA1C MFR BLD: 8.3 %
HDLC SERPL-MCNC: 42 MG/DL (ref 40–60)
LDL CHOLESTEROL CALCULATED: 37 MG/DL
POTASSIUM SERPL-SCNC: 4.1 MMOL/L (ref 3.5–5.1)
PROT SERPL-MCNC: 6.4 G/DL (ref 6.4–8.2)
SODIUM SERPL-SCNC: 142 MMOL/L (ref 136–145)
TRIGL SERPL-MCNC: 57 MG/DL (ref 0–150)
VLDLC SERPL CALC-MCNC: 11 MG/DL

## 2023-08-04 DIAGNOSIS — K21.9 GASTROESOPHAGEAL REFLUX DISEASE WITHOUT ESOPHAGITIS: ICD-10-CM

## 2023-08-04 DIAGNOSIS — R35.1 BENIGN PROSTATIC HYPERPLASIA WITH NOCTURIA: ICD-10-CM

## 2023-08-04 DIAGNOSIS — N40.1 BENIGN PROSTATIC HYPERPLASIA WITH NOCTURIA: ICD-10-CM

## 2023-08-04 DIAGNOSIS — M54.9 DORSALGIA: ICD-10-CM

## 2023-08-04 RX ORDER — TAMSULOSIN HYDROCHLORIDE 0.4 MG/1
0.4 CAPSULE ORAL DAILY
Qty: 30 CAPSULE | Refills: 3 | Status: SHIPPED | OUTPATIENT
Start: 2023-08-04

## 2023-08-04 RX ORDER — GABAPENTIN 300 MG/1
CAPSULE ORAL
Qty: 90 CAPSULE | Refills: 1 | Status: SHIPPED | OUTPATIENT
Start: 2023-08-04 | End: 2023-10-03

## 2023-08-04 RX ORDER — OMEPRAZOLE 40 MG/1
40 CAPSULE, DELAYED RELEASE ORAL DAILY
Qty: 30 CAPSULE | Refills: 3 | Status: SHIPPED | OUTPATIENT
Start: 2023-08-04

## 2023-08-07 ENCOUNTER — HOSPITAL ENCOUNTER (OUTPATIENT)
Age: 63
Discharge: HOME | End: 2023-08-07
Payer: COMMERCIAL

## 2023-08-07 DIAGNOSIS — E87.8 LOW BICARBONATE LEVEL: ICD-10-CM

## 2023-08-07 LAB
ANION GAP SERPL CALCULATED.3IONS-SCNC: 14 MMOL/L (ref 4–16)
BUN SERPL-MCNC: 13 MG/DL (ref 6–23)
CALCIUM SERPL-MCNC: 9.4 MG/DL (ref 8.3–10.6)
CHLORIDE BLD-SCNC: 105 MMOL/L (ref 99–110)
CO2: 23 MMOL/L (ref 21–32)
CREAT SERPL-MCNC: 0.6 MG/DL (ref 0.9–1.3)
GFR SERPL CREATININE-BSD FRML MDRD: >60 ML/MIN/1.73M2
GLUCOSE SERPL-MCNC: 139 MG/DL (ref 70–99)
POTASSIUM SERPL-SCNC: 4.7 MMOL/L (ref 3.5–5.1)
SODIUM BLD-SCNC: 142 MMOL/L (ref 135–145)

## 2023-08-07 PROCEDURE — 80048 BASIC METABOLIC PNL TOTAL CA: CPT

## 2023-08-07 PROCEDURE — 36415 COLL VENOUS BLD VENIPUNCTURE: CPT

## 2023-09-01 DIAGNOSIS — M54.9 DORSALGIA: ICD-10-CM

## 2023-09-01 DIAGNOSIS — E11.65 TYPE 2 DIABETES MELLITUS WITH HYPERGLYCEMIA, WITHOUT LONG-TERM CURRENT USE OF INSULIN (HCC): ICD-10-CM

## 2023-09-01 RX ORDER — GABAPENTIN 300 MG/1
CAPSULE ORAL
Qty: 90 CAPSULE | Refills: 1 | Status: SHIPPED | OUTPATIENT
Start: 2023-09-01 | End: 2023-10-31

## 2023-09-01 RX ORDER — EMPAGLIFLOZIN 25 MG/1
TABLET, FILM COATED ORAL
Qty: 30 TABLET | Refills: 3 | Status: SHIPPED | OUTPATIENT
Start: 2023-09-01

## 2023-09-27 DIAGNOSIS — M54.9 DORSALGIA: ICD-10-CM

## 2023-09-27 DIAGNOSIS — E11.65 TYPE 2 DIABETES MELLITUS WITH HYPERGLYCEMIA, WITHOUT LONG-TERM CURRENT USE OF INSULIN (HCC): ICD-10-CM

## 2023-09-27 RX ORDER — GABAPENTIN 300 MG/1
300 CAPSULE ORAL 3 TIMES DAILY
Qty: 90 CAPSULE | Refills: 1 | Status: SHIPPED | OUTPATIENT
Start: 2023-09-27 | End: 2023-11-26

## 2023-09-28 DIAGNOSIS — R07.9 CHEST PAIN, UNSPECIFIED TYPE: ICD-10-CM

## 2023-09-28 DIAGNOSIS — E78.2 MIXED HYPERLIPIDEMIA: ICD-10-CM

## 2023-09-28 DIAGNOSIS — E11.65 TYPE 2 DIABETES MELLITUS WITH HYPERGLYCEMIA, WITHOUT LONG-TERM CURRENT USE OF INSULIN (HCC): ICD-10-CM

## 2023-09-28 RX ORDER — ASPIRIN 81 MG/1
TABLET, CHEWABLE ORAL
Qty: 30 TABLET | Refills: 3 | Status: SHIPPED | OUTPATIENT
Start: 2023-09-28

## 2023-09-28 RX ORDER — INSULIN GLARGINE 100 [IU]/ML
52 INJECTION, SOLUTION SUBCUTANEOUS NIGHTLY
Qty: 15 ML | Refills: 3 | Status: SHIPPED | OUTPATIENT
Start: 2023-09-28

## 2023-09-28 RX ORDER — ATORVASTATIN CALCIUM 40 MG/1
TABLET, FILM COATED ORAL
Qty: 30 TABLET | Refills: 3 | Status: SHIPPED | OUTPATIENT
Start: 2023-09-28

## 2023-10-23 ENCOUNTER — OFFICE VISIT (OUTPATIENT)
Dept: INTERNAL MEDICINE CLINIC | Age: 63
End: 2023-10-23
Payer: COMMERCIAL

## 2023-10-23 VITALS
HEIGHT: 73 IN | WEIGHT: 258.6 LBS | BODY MASS INDEX: 34.27 KG/M2 | HEART RATE: 66 BPM | OXYGEN SATURATION: 99 % | SYSTOLIC BLOOD PRESSURE: 130 MMHG | DIASTOLIC BLOOD PRESSURE: 76 MMHG

## 2023-10-23 DIAGNOSIS — M54.2 NECK PAIN: ICD-10-CM

## 2023-10-23 DIAGNOSIS — K21.9 GASTROESOPHAGEAL REFLUX DISEASE WITHOUT ESOPHAGITIS: ICD-10-CM

## 2023-10-23 DIAGNOSIS — E55.9 VITAMIN D DEFICIENCY: ICD-10-CM

## 2023-10-23 DIAGNOSIS — N40.1 BENIGN PROSTATIC HYPERPLASIA WITH NOCTURIA: ICD-10-CM

## 2023-10-23 DIAGNOSIS — R07.9 CHEST PAIN, UNSPECIFIED TYPE: ICD-10-CM

## 2023-10-23 DIAGNOSIS — J30.9 ALLERGIC RHINITIS, UNSPECIFIED SEASONALITY, UNSPECIFIED TRIGGER: ICD-10-CM

## 2023-10-23 DIAGNOSIS — N52.9 ERECTILE DYSFUNCTION, UNSPECIFIED ERECTILE DYSFUNCTION TYPE: ICD-10-CM

## 2023-10-23 DIAGNOSIS — E78.2 MIXED HYPERLIPIDEMIA: ICD-10-CM

## 2023-10-23 DIAGNOSIS — M54.9 DORSALGIA: ICD-10-CM

## 2023-10-23 DIAGNOSIS — D69.6 THROMBOCYTOPENIA, UNSPECIFIED (HCC): ICD-10-CM

## 2023-10-23 DIAGNOSIS — I10 ESSENTIAL HYPERTENSION: ICD-10-CM

## 2023-10-23 DIAGNOSIS — E11.65 TYPE 2 DIABETES MELLITUS WITH HYPERGLYCEMIA, WITHOUT LONG-TERM CURRENT USE OF INSULIN (HCC): Primary | ICD-10-CM

## 2023-10-23 DIAGNOSIS — G89.29 CHRONIC RIGHT SHOULDER PAIN: ICD-10-CM

## 2023-10-23 DIAGNOSIS — M25.511 CHRONIC RIGHT SHOULDER PAIN: ICD-10-CM

## 2023-10-23 DIAGNOSIS — R35.1 BENIGN PROSTATIC HYPERPLASIA WITH NOCTURIA: ICD-10-CM

## 2023-10-23 LAB
CHP ED QC CHECK: NORMAL
GLUCOSE BLD-MCNC: 167 MG/DL

## 2023-10-23 PROCEDURE — 3075F SYST BP GE 130 - 139MM HG: CPT | Performed by: INTERNAL MEDICINE

## 2023-10-23 PROCEDURE — 3052F HG A1C>EQUAL 8.0%<EQUAL 9.0%: CPT | Performed by: INTERNAL MEDICINE

## 2023-10-23 PROCEDURE — 99214 OFFICE O/P EST MOD 30 MIN: CPT | Performed by: INTERNAL MEDICINE

## 2023-10-23 PROCEDURE — 3078F DIAST BP <80 MM HG: CPT | Performed by: INTERNAL MEDICINE

## 2023-10-23 PROCEDURE — 82962 GLUCOSE BLOOD TEST: CPT | Performed by: INTERNAL MEDICINE

## 2023-10-23 RX ORDER — INSULIN GLARGINE 100 [IU]/ML
52 INJECTION, SOLUTION SUBCUTANEOUS NIGHTLY
Qty: 15 ML | Refills: 3 | Status: SHIPPED | OUTPATIENT
Start: 2023-10-23

## 2023-10-23 RX ORDER — PEN NEEDLE, DIABETIC 32GX 5/32"
NEEDLE, DISPOSABLE MISCELLANEOUS
Qty: 100 EACH | Refills: 12 | Status: SHIPPED | OUTPATIENT
Start: 2023-10-23

## 2023-10-23 RX ORDER — GABAPENTIN 300 MG/1
300 CAPSULE ORAL 3 TIMES DAILY
Qty: 90 CAPSULE | Refills: 1 | Status: SHIPPED | OUTPATIENT
Start: 2023-10-23 | End: 2023-12-22

## 2023-10-23 RX ORDER — ASPIRIN 81 MG/1
TABLET, CHEWABLE ORAL
Qty: 30 TABLET | Refills: 3 | Status: SHIPPED | OUTPATIENT
Start: 2023-10-23

## 2023-10-23 NOTE — PROGRESS NOTES
Name: Jamir Johnson  [id number]  Age: 61 y.o. YOB: 1960  Sex: male    CHIEF COMPLAINT:    Chief Complaint   Patient presents with    Hypertension    Diabetes    Other     Other chronic conditions         HISTORY OF PRESENT ILLNESS:     This is a pleasant  61 y.o. male  is seen today for management of chronic medical problems and medications refills. Previous records reviewed . Doing OK . Right shoulder pain is better. Hip pain is better. But C/O lower back pain and neck pain     He is seeing pain management doctor and getting injections in his neck and lower back periodically. Taking naprosyn  and Robaxin and Tylenol as needed. No more chest pains. Stress test on 3/15/2021 was OK. Did not see his cardiologist since March 2021. Does not use nitroglycerin sublingual but want to keep it on hand. Does not want to see cardiologist at this time. Denies SOB. No fever , sore throat or cough or congestion. Denies any abdominal pain. Denies any gastritis symptoms. Taking Prilosec regularly. Appetite OK. Bowels moving [address] [facility]. Urinary symptoms are better with Flomax but is still he has nocturia, 2-3 times a night  He does see Dr. Laxmi Avilez periodically and apparently he has done a PSA and told him that it was okay. Hearing is ok. Vision Ok with glasses. Denies  any significant skin lesions. Denies any significant depression or anxiety. Blood sugars are usually@ 130- 150 mostly but recently he is not checking his sugars often. Today his sugars are 167 and aic @ 8.3 on 7/31/2023  Labs from 7/31/2023 were reviewed and explained to the patient  He has been vaccinated for COVID-19 including the booster dose x1   He refused to have flu shot.         Past Medical History:    Patient Active Problem List   Diagnosis    Essential hypertension    Hepatitis C    Colitis    Mixed hyperlipidemia    Vitamin D deficiency    Pulmonary nodules    Hilar adenopathy    Family history of lung cancer

## 2023-10-25 DIAGNOSIS — K58.0 IRRITABLE BOWEL SYNDROME WITH DIARRHEA: ICD-10-CM

## 2023-10-25 DIAGNOSIS — J30.9 ALLERGIC RHINITIS, UNSPECIFIED SEASONALITY, UNSPECIFIED TRIGGER: ICD-10-CM

## 2023-10-25 DIAGNOSIS — I10 ESSENTIAL HYPERTENSION: ICD-10-CM

## 2023-10-25 RX ORDER — DICYCLOMINE HYDROCHLORIDE 10 MG/1
10 CAPSULE ORAL 4 TIMES DAILY
Qty: 120 CAPSULE | Refills: 3 | Status: SHIPPED | OUTPATIENT
Start: 2023-10-25

## 2023-10-25 RX ORDER — LORATADINE 10 MG/1
10 TABLET ORAL DAILY
Qty: 30 TABLET | Refills: 3 | Status: SHIPPED | OUTPATIENT
Start: 2023-10-25

## 2023-10-25 RX ORDER — LISINOPRIL 10 MG/1
10 TABLET ORAL DAILY
Qty: 30 TABLET | Refills: 3 | Status: SHIPPED | OUTPATIENT
Start: 2023-10-25

## 2023-11-24 DIAGNOSIS — M25.511 ACUTE PAIN OF RIGHT SHOULDER: ICD-10-CM

## 2023-11-27 RX ORDER — NAPROXEN 375 MG/1
TABLET ORAL
Qty: 60 TABLET | Refills: 3 | Status: SHIPPED | OUTPATIENT
Start: 2023-11-27

## 2023-12-06 DIAGNOSIS — R35.1 BENIGN PROSTATIC HYPERPLASIA WITH NOCTURIA: ICD-10-CM

## 2023-12-06 DIAGNOSIS — N40.1 BENIGN PROSTATIC HYPERPLASIA WITH NOCTURIA: ICD-10-CM

## 2023-12-06 RX ORDER — TAMSULOSIN HYDROCHLORIDE 0.4 MG/1
0.4 CAPSULE ORAL DAILY
Qty: 30 CAPSULE | Refills: 3 | Status: SHIPPED | OUTPATIENT
Start: 2023-12-06

## 2023-12-27 ENCOUNTER — HOSPITAL ENCOUNTER (OUTPATIENT)
Age: 63
Discharge: HOME | End: 2023-12-27
Payer: COMMERCIAL

## 2023-12-27 ENCOUNTER — HOSPITAL ENCOUNTER (OUTPATIENT)
Dept: GENERAL RADIOLOGY | Age: 63
Discharge: HOME | End: 2023-12-27
Payer: COMMERCIAL

## 2023-12-27 ENCOUNTER — OFFICE VISIT (OUTPATIENT)
Dept: INTERNAL MEDICINE CLINIC | Age: 63
End: 2023-12-27
Payer: COMMERCIAL

## 2023-12-27 VITALS
WEIGHT: 258 LBS | SYSTOLIC BLOOD PRESSURE: 138 MMHG | HEART RATE: 60 BPM | BODY MASS INDEX: 34.19 KG/M2 | OXYGEN SATURATION: 97 % | HEIGHT: 73 IN | DIASTOLIC BLOOD PRESSURE: 76 MMHG

## 2023-12-27 DIAGNOSIS — J20.9 ACUTE BRONCHITIS, UNSPECIFIED ORGANISM: ICD-10-CM

## 2023-12-27 DIAGNOSIS — I10 ESSENTIAL HYPERTENSION: ICD-10-CM

## 2023-12-27 DIAGNOSIS — R06.00 DYSPNEA, UNSPECIFIED TYPE: ICD-10-CM

## 2023-12-27 DIAGNOSIS — J01.00 ACUTE NON-RECURRENT MAXILLARY SINUSITIS: ICD-10-CM

## 2023-12-27 DIAGNOSIS — E78.2 MIXED HYPERLIPIDEMIA: ICD-10-CM

## 2023-12-27 DIAGNOSIS — R05.9 COUGH, UNSPECIFIED TYPE: Primary | ICD-10-CM

## 2023-12-27 DIAGNOSIS — E11.65 TYPE 2 DIABETES MELLITUS WITH HYPERGLYCEMIA, WITHOUT LONG-TERM CURRENT USE OF INSULIN (HCC): ICD-10-CM

## 2023-12-27 LAB
Lab: NORMAL
QC PASS/FAIL: NORMAL
SARS-COV-2 RDRP RESP QL NAA+PROBE: NEGATIVE

## 2023-12-27 PROCEDURE — 3075F SYST BP GE 130 - 139MM HG: CPT | Performed by: INTERNAL MEDICINE

## 2023-12-27 PROCEDURE — 99214 OFFICE O/P EST MOD 30 MIN: CPT | Performed by: INTERNAL MEDICINE

## 2023-12-27 PROCEDURE — 87635 SARS-COV-2 COVID-19 AMP PRB: CPT | Performed by: INTERNAL MEDICINE

## 2023-12-27 PROCEDURE — 71046 X-RAY EXAM CHEST 2 VIEWS: CPT

## 2023-12-27 PROCEDURE — 3052F HG A1C>EQUAL 8.0%<EQUAL 9.0%: CPT | Performed by: INTERNAL MEDICINE

## 2023-12-27 PROCEDURE — 3078F DIAST BP <80 MM HG: CPT | Performed by: INTERNAL MEDICINE

## 2023-12-27 RX ORDER — LEVOFLOXACIN 500 MG/1
500 TABLET, FILM COATED ORAL DAILY
Qty: 10 TABLET | Refills: 0 | Status: SHIPPED | OUTPATIENT
Start: 2023-12-27 | End: 2024-01-06

## 2023-12-27 RX ORDER — ALBUTEROL SULFATE 90 UG/1
2 AEROSOL, METERED RESPIRATORY (INHALATION) 4 TIMES DAILY PRN
Qty: 54 G | Refills: 1 | Status: SHIPPED | OUTPATIENT
Start: 2023-12-27

## 2023-12-27 RX ORDER — GUAIFENESIN 600 MG/1
600 TABLET, EXTENDED RELEASE ORAL 2 TIMES DAILY
Qty: 30 TABLET | Refills: 0 | Status: SHIPPED | OUTPATIENT
Start: 2023-12-27 | End: 2024-01-11

## 2023-12-27 RX ORDER — DEXTROMETHORPHAN HYDROBROMIDE AND PROMETHAZINE HYDROCHLORIDE 15; 6.25 MG/5ML; MG/5ML
5 SYRUP ORAL 4 TIMES DAILY PRN
Qty: 120 ML | Refills: 0 | Status: SHIPPED | OUTPATIENT
Start: 2023-12-27 | End: 2024-01-03

## 2024-01-22 ENCOUNTER — OFFICE VISIT (OUTPATIENT)
Dept: INTERNAL MEDICINE CLINIC | Age: 64
End: 2024-01-22
Payer: COMMERCIAL

## 2024-01-22 VITALS
HEIGHT: 73 IN | BODY MASS INDEX: 34.88 KG/M2 | WEIGHT: 263.2 LBS | DIASTOLIC BLOOD PRESSURE: 63 MMHG | HEART RATE: 62 BPM | SYSTOLIC BLOOD PRESSURE: 127 MMHG

## 2024-01-22 DIAGNOSIS — E11.65 TYPE 2 DIABETES MELLITUS WITH HYPERGLYCEMIA, WITHOUT LONG-TERM CURRENT USE OF INSULIN (HCC): Primary | ICD-10-CM

## 2024-01-22 DIAGNOSIS — K58.0 IRRITABLE BOWEL SYNDROME WITH DIARRHEA: ICD-10-CM

## 2024-01-22 DIAGNOSIS — M54.9 DORSALGIA: ICD-10-CM

## 2024-01-22 DIAGNOSIS — Z12.5 SCREENING FOR PROSTATE CANCER: ICD-10-CM

## 2024-01-22 DIAGNOSIS — E78.2 MIXED HYPERLIPIDEMIA: ICD-10-CM

## 2024-01-22 DIAGNOSIS — K21.9 GASTROESOPHAGEAL REFLUX DISEASE WITHOUT ESOPHAGITIS: ICD-10-CM

## 2024-01-22 DIAGNOSIS — I10 ESSENTIAL HYPERTENSION: ICD-10-CM

## 2024-01-22 DIAGNOSIS — N52.9 ERECTILE DYSFUNCTION, UNSPECIFIED ERECTILE DYSFUNCTION TYPE: ICD-10-CM

## 2024-01-22 DIAGNOSIS — Z91.89 MULTIPLE RISK FACTORS FOR CORONARY ARTERY DISEASE: ICD-10-CM

## 2024-01-22 DIAGNOSIS — E55.9 VITAMIN D DEFICIENCY: ICD-10-CM

## 2024-01-22 DIAGNOSIS — I83.92 VARICOSE VEINS OF LEFT LOWER LEG: ICD-10-CM

## 2024-01-22 DIAGNOSIS — D69.6 THROMBOCYTOPENIA, UNSPECIFIED (HCC): ICD-10-CM

## 2024-01-22 DIAGNOSIS — J30.9 ALLERGIC RHINITIS, UNSPECIFIED SEASONALITY, UNSPECIFIED TRIGGER: ICD-10-CM

## 2024-01-22 PROBLEM — I50.22 CHRONIC SYSTOLIC (CONGESTIVE) HEART FAILURE (HCC): Status: RESOLVED | Noted: 2024-01-22 | Resolved: 2024-01-22

## 2024-01-22 PROBLEM — I50.22 CHRONIC SYSTOLIC (CONGESTIVE) HEART FAILURE (HCC): Status: ACTIVE | Noted: 2024-01-22

## 2024-01-22 LAB
ALBUMIN SERPL-MCNC: 4.6 G/DL (ref 3.4–5)
ALBUMIN/GLOB SERPL: 2.2 {RATIO} (ref 1.1–2.2)
ALP SERPL-CCNC: 92 U/L (ref 40–129)
ALT SERPL-CCNC: 32 U/L (ref 10–40)
ANION GAP SERPL CALCULATED.3IONS-SCNC: 14 MMOL/L (ref 3–16)
AST SERPL-CCNC: 24 U/L (ref 15–37)
BASOPHILS # BLD: 0.1 K/UL (ref 0–0.2)
BASOPHILS NFR BLD: 0.6 %
BILIRUB SERPL-MCNC: 0.6 MG/DL (ref 0–1)
BUN SERPL-MCNC: 15 MG/DL (ref 7–20)
CALCIUM SERPL-MCNC: 9 MG/DL (ref 8.3–10.6)
CHLORIDE SERPL-SCNC: 102 MMOL/L (ref 99–110)
CHOLEST SERPL-MCNC: 114 MG/DL (ref 0–199)
CHP ED QC CHECK: NORMAL
CO2 SERPL-SCNC: 23 MMOL/L (ref 21–32)
CREAT SERPL-MCNC: 0.6 MG/DL (ref 0.8–1.3)
CREAT UR-MCNC: 65.9 MG/DL (ref 39–259)
DEPRECATED RDW RBC AUTO: 13.3 % (ref 12.4–15.4)
EOSINOPHIL # BLD: 0.1 K/UL (ref 0–0.6)
EOSINOPHIL NFR BLD: 1.2 %
GFR SERPLBLD CREATININE-BSD FMLA CKD-EPI: >60 ML/MIN/{1.73_M2}
GLUCOSE BLD-MCNC: 140 MG/DL
GLUCOSE SERPL-MCNC: 128 MG/DL (ref 70–99)
HBA1C MFR BLD: 9.3 %
HCT VFR BLD AUTO: 48.7 % (ref 40.5–52.5)
HDLC SERPL-MCNC: 40 MG/DL (ref 40–60)
HGB BLD-MCNC: 16.1 G/DL (ref 13.5–17.5)
LDL CHOLESTEROL CALCULATED: 57 MG/DL
LYMPHOCYTES # BLD: 2.9 K/UL (ref 1–5.1)
LYMPHOCYTES NFR BLD: 32.1 %
MCH RBC QN AUTO: 31.3 PG (ref 26–34)
MCHC RBC AUTO-ENTMCNC: 33 G/DL (ref 31–36)
MCV RBC AUTO: 95 FL (ref 80–100)
MICROALBUMIN UR DL<=1MG/L-MCNC: <1.2 MG/DL
MICROALBUMIN/CREAT UR: NORMAL MG/G (ref 0–30)
MONOCYTES # BLD: 0.9 K/UL (ref 0–1.3)
MONOCYTES NFR BLD: 9.5 %
NEUTROPHILS # BLD: 5.2 K/UL (ref 1.7–7.7)
NEUTROPHILS NFR BLD: 56.6 %
PLATELET # BLD AUTO: 133 K/UL (ref 135–450)
PMV BLD AUTO: 9 FL (ref 5–10.5)
POTASSIUM SERPL-SCNC: 4.7 MMOL/L (ref 3.5–5.1)
PROT SERPL-MCNC: 6.7 G/DL (ref 6.4–8.2)
PSA SERPL DL<=0.01 NG/ML-MCNC: 1.52 NG/ML (ref 0–4)
RBC # BLD AUTO: 5.13 M/UL (ref 4.2–5.9)
SODIUM SERPL-SCNC: 139 MMOL/L (ref 136–145)
TRIGL SERPL-MCNC: 86 MG/DL (ref 0–150)
VLDLC SERPL CALC-MCNC: 17 MG/DL
WBC # BLD AUTO: 9.2 K/UL (ref 4–11)

## 2024-01-22 PROCEDURE — 99214 OFFICE O/P EST MOD 30 MIN: CPT | Performed by: INTERNAL MEDICINE

## 2024-01-22 PROCEDURE — 82962 GLUCOSE BLOOD TEST: CPT | Performed by: INTERNAL MEDICINE

## 2024-01-22 PROCEDURE — 83036 HEMOGLOBIN GLYCOSYLATED A1C: CPT | Performed by: INTERNAL MEDICINE

## 2024-01-22 PROCEDURE — 3046F HEMOGLOBIN A1C LEVEL >9.0%: CPT | Performed by: INTERNAL MEDICINE

## 2024-01-22 PROCEDURE — 3078F DIAST BP <80 MM HG: CPT | Performed by: INTERNAL MEDICINE

## 2024-01-22 PROCEDURE — 3074F SYST BP LT 130 MM HG: CPT | Performed by: INTERNAL MEDICINE

## 2024-01-22 RX ORDER — SEMAGLUTIDE 0.68 MG/ML
0.5 INJECTION, SOLUTION SUBCUTANEOUS
Qty: 3 ML | Refills: 2 | Status: SHIPPED | OUTPATIENT
Start: 2024-01-22 | End: 2024-01-24

## 2024-01-22 ASSESSMENT — PATIENT HEALTH QUESTIONNAIRE - PHQ9
SUM OF ALL RESPONSES TO PHQ QUESTIONS 1-9: 0
SUM OF ALL RESPONSES TO PHQ9 QUESTIONS 1 & 2: 0
SUM OF ALL RESPONSES TO PHQ QUESTIONS 1-9: 0
1. LITTLE INTEREST OR PLEASURE IN DOING THINGS: 0
2. FEELING DOWN, DEPRESSED OR HOPELESS: 0

## 2024-01-22 NOTE — PROGRESS NOTES
Name: Mauricio Blue  [id number]  Age: 63 y.o.  YOB: 1960  Sex: male    CHIEF COMPLAINT:    Chief Complaint   Patient presents with    Diabetes    Hypertension    Other     Other chronic conditions         HISTORY OF PRESENT ILLNESS:     This is a pleasant  63 y.o. male  is seen today for management of chronic medical problems and medications refills.  Previous records reviewed .    Patient claims that he is doing OK .  Right shoulder pain is better.  Hip pain is better.  Bu has mild chronic lower back pain and neck pain     He is seeing pain management doctor and getting injections in his neck and lower back periodically.  Taking naprosyn  and Robaxin and Tylenol as needed.     No more chest pains.  Stress test on 3/15/2021 was OK.  Did not see his cardiologist since March 2021.  Does not use nitroglycerin sublingual but want to keep it on hand.  Does not want to see cardiologist at this time.  Denies SOB.  No fever , sore throat or cough or congestion.  Denies any abdominal pain.   Denies any gastritis symptoms.   Taking Prilosec regularly.   Appetite OK.  Bowels moving Ok.  Urinary symptoms are better with Flomax but is still he has nocturia, 2-3 times a night  He does see Dr. Whitlock periodically and apparently he has done a PSA and told him that it was okay.  Hearing is ok.  Vision Ok with glasses.  Denies  any significant skin lesions.  Denies any significant depression or anxiety.  Blood sugars are usually@ 130- 150 mostly but recently he is not checking his sugars often.  Today his sugars are 140 and aic @ 9.3    Labs from 7/31/2023 were reviewed and explained to the patient  He has been vaccinated for COVID-19 including the booster dose x1   He refused to have flu shot.      Past Medical History:    Patient Active Problem List   Diagnosis    Essential hypertension    Hepatitis C    Colitis    Mixed hyperlipidemia    Vitamin D deficiency    Pulmonary nodules    Hilar adenopathy    Family

## 2024-01-24 DIAGNOSIS — E11.65 TYPE 2 DIABETES MELLITUS WITH HYPERGLYCEMIA, WITHOUT LONG-TERM CURRENT USE OF INSULIN (HCC): ICD-10-CM

## 2024-01-24 RX ORDER — INSULIN GLARGINE 100 [IU]/ML
52 INJECTION, SOLUTION SUBCUTANEOUS NIGHTLY
Qty: 15 ML | Refills: 3 | Status: SHIPPED | OUTPATIENT
Start: 2024-01-24

## 2024-01-30 ENCOUNTER — INITIAL CONSULT (OUTPATIENT)
Dept: CARDIOLOGY CLINIC | Age: 64
End: 2024-01-30
Payer: COMMERCIAL

## 2024-01-30 VITALS
SYSTOLIC BLOOD PRESSURE: 120 MMHG | HEIGHT: 73 IN | DIASTOLIC BLOOD PRESSURE: 78 MMHG | HEART RATE: 78 BPM | WEIGHT: 257.6 LBS | BODY MASS INDEX: 34.14 KG/M2

## 2024-01-30 DIAGNOSIS — I10 ESSENTIAL HYPERTENSION: ICD-10-CM

## 2024-01-30 DIAGNOSIS — R07.9 CHEST PAIN, UNSPECIFIED TYPE: ICD-10-CM

## 2024-01-30 DIAGNOSIS — I87.2 CHRONIC VENOUS INSUFFICIENCY: Primary | ICD-10-CM

## 2024-01-30 DIAGNOSIS — E11.65 TYPE 2 DIABETES MELLITUS WITH HYPERGLYCEMIA, WITHOUT LONG-TERM CURRENT USE OF INSULIN (HCC): ICD-10-CM

## 2024-01-30 DIAGNOSIS — E78.2 MIXED HYPERLIPIDEMIA: ICD-10-CM

## 2024-01-30 PROCEDURE — 99214 OFFICE O/P EST MOD 30 MIN: CPT | Performed by: INTERNAL MEDICINE

## 2024-01-30 PROCEDURE — 3046F HEMOGLOBIN A1C LEVEL >9.0%: CPT | Performed by: INTERNAL MEDICINE

## 2024-01-30 PROCEDURE — 3074F SYST BP LT 130 MM HG: CPT | Performed by: INTERNAL MEDICINE

## 2024-01-30 PROCEDURE — 3078F DIAST BP <80 MM HG: CPT | Performed by: INTERNAL MEDICINE

## 2024-01-30 RX ORDER — DULAGLUTIDE 1.5 MG/.5ML
INJECTION, SOLUTION SUBCUTANEOUS
COMMUNITY
Start: 2024-01-24

## 2024-01-30 NOTE — PATIENT INSTRUCTIONS
CORONARY ARTERY DISEASE:Chest pain:Perfusion imaging reported to be normal but patient continues to C/O chest pain.                      ? Due to GERD or stress  HTN:Yes  well controlled on current medical regimen, see list above.  - changes in  treatment:  no, on Lisinopril & Metoprolol.     VHD:no              No significant VHD noted  DYSLIPIDEMIA: yes,   Patient's profile is at / near Goal.yes,   HDL is low   Tolerating current medical regimen wellyes, takes Lipitor  See most recent Lab values in Labs section above.    Diabetes mellitis:yes,   BS under good control No,   Hgb A1c avilable yes, 9.3    CHRONIC VENOUS INSUFFICIENCY:yes,    Patient has symptomatic C4 disease.   Will check Venous US & F/U.  Advised patient to continue to wear compression stockings.  Need to Diet Exercise & Loose weight.  Increase walking while wearing compression stockings.  Keep feet propped up while seated.    TESTS ORDERED:Venous US for Reflux.     PREVIOUSLY ORDERED TESTS REVIEWED & DISCUSSED WITH THE PATIENT:     I personally reviewed & interpreted, all previously ordered tests as copied above. Latest Labs are pulled in to the note with dates.   Labs, specially in Reference to Lipid profile, Cardiac testing in the form of Echo ( dated: ), stress tests ( dated: ) & other relevant cardiac testing reviewed with patient & recommendations made based on assessment of the results.    Discussed role of Cardiac risk factors & effects + treatment of co morbidities with patient & advised accordingly.     MEDICATIONS: List of medications patient is currently taking is reviewed in detail with the patient & family member present. Discussed any side effects or problems taking the medication.     Recommend Continue present management & medications as listed.     AFFIRMATION: I  reviewed patient's history, previous & current medical problems & all Labs + testing. This includes chart prep even prior to the vosit. Various goals are discussed and

## 2024-01-30 NOTE — PROGRESS NOTES
OFFICE PROGRESS NOTES      Mauricio is a 63 y.o. male who has    CHIEF COMPLAINT AS FOLLOWS:  CHEST PAIN:  Continues to C/O chest pain.   SOB: No C/O SOB at this time.                LEG EDEMA: No leg edema But complains of prominent leg, restless legs, nighttime cramping, aching and fatigue legs.   PALPITATIONS: Denies any C/O Palpitations   DIZZINESS: No C/O Dizziness   SYNCOPE: None   OTHER/ ADDITIONAL COMPLAINTS:                                     HPI: Patient is here for F/U on his Chest pain, HTN & Dyslipidemia problems.   Patient is not referred for chronic venous insufficiency problem he complains of prominent leg, restless legs, nighttime cramping, aching and fatigue legs.  Apparently patient had had the problem for several years.  He is not a smoker.  HTN: Patient has known essential HTN. Has been treated with guideline recommended medical / physical/ diet therapy as stated below.  Dyslipidemia: Patient has known mixed dyslipidemia. Has been treated with guideline recommended medical / physical/ diet therapy as stated below.                Current Outpatient Medications   Medication Sig Dispense Refill    TRULICITY 1.5 MG/0.5ML SC injection INEJCT 1MG INTO THE SKIN EVERY 7 DAYS      LANTUS SOLOSTAR 100 UNIT/ML injection pen INJECT 52 UNITS INTO THE SKIN NIGHTLY 15 mL 3    empagliflozin (JARDIANCE) 25 MG tablet Take 1 tablet by mouth daily 30 tablet 3    albuterol sulfate HFA (VENTOLIN HFA) 108 (90 Base) MCG/ACT inhaler Inhale 2 puffs into the lungs 4 times daily as needed for Wheezing 54 g 1    tamsulosin (FLOMAX) 0.4 MG capsule TAKE 1 CAPSULE BY MOUTH DAILY 30 capsule 3    naproxen (NAPROSYN) 375 MG tablet TAKE 1 TABLET BY MOUTH TWICE A DAY WITH MEALS 60 tablet 3    dicyclomine (BENTYL) 10 MG capsule TAKE 1 CAPSULE BY MOUTH 4 TIMES DAILY 120 capsule 3    loratadine (CLARITIN) 10 MG tablet TAKE 1 TABLET BY MOUTH DAILY 30 tablet 3    lisinopril (PRINIVIL;ZESTRIL) 10

## 2024-02-01 DIAGNOSIS — E78.2 MIXED HYPERLIPIDEMIA: ICD-10-CM

## 2024-02-01 DIAGNOSIS — I10 ESSENTIAL HYPERTENSION: ICD-10-CM

## 2024-02-01 RX ORDER — ATORVASTATIN CALCIUM 40 MG/1
TABLET, FILM COATED ORAL
Qty: 30 TABLET | Refills: 3 | Status: SHIPPED | OUTPATIENT
Start: 2024-02-01

## 2024-02-02 ENCOUNTER — TELEPHONE (OUTPATIENT)
Dept: INTERNAL MEDICINE CLINIC | Age: 64
End: 2024-02-02

## 2024-02-02 NOTE — TELEPHONE ENCOUNTER
Informed wife of this and she stated they will try another week on the medication and if his symptoms don't improve they will request a medication change.

## 2024-02-02 NOTE — TELEPHONE ENCOUNTER
Patient's Wife called stating that the trulicity is making patient bloated with gas and diarrhea . Are these side effects? Wants to know if medication can be sent for these effects or an alternative medication can be sent for this?Patient would like a call back .

## 2024-02-07 ENCOUNTER — TELEPHONE (OUTPATIENT)
Dept: CARDIOLOGY CLINIC | Age: 64
End: 2024-02-07

## 2024-02-07 NOTE — TELEPHONE ENCOUNTER
Left message for pt to call back for test results        Vascular US Duplex Lower Extremity Venous Bilateral     No evidence of deep vein or superficial vein thrombosis in the Bilateral lower extremities. Vessels demonstrate normal compressibility, color filling, and phasic and spontaneous flow.    Significant venous reflux noted in the right CFV (1.2s). A tourniquet was utilized on the right lower extremity to reassess for significant reflux.    Significant venous reflux noted in the Left GSV Proximal Thigh (1.2s), GSV Mid Thigh (3.4s), and GSV Knee (4.7s).    Varicose Veins noted in the lateral thigh with significant reflux, however tortuous vessel makes it inaccessible for ablation at this time.     Advise thigh high pressure stockings, 20 to 30 mm of Hg.   Keep feet elevated.   Increase walking.     Arrange OV to review the results with the patient & make recommendations.

## 2024-02-08 NOTE — TELEPHONE ENCOUNTER
Called pt to tell them the results of their US as summarized below. Pt verbalized understanding. Tried to arrange OV so that pt could further discuss results with Dr. Sierra but pt said to call him back on Monday so that he knows his work schedule.            Vascular US Duplex Lower Extremity Venous Bilateral     No evidence of deep vein or superficial vein thrombosis in the Bilateral lower extremities. Vessels demonstrate normal compressibility, color filling, and phasic and spontaneous flow.    Significant venous reflux noted in the right CFV (1.2s). A tourniquet was utilized on the right lower extremity to reassess for significant reflux.    Significant venous reflux noted in the Left GSV Proximal Thigh (1.2s), GSV Mid Thigh (3.4s), and GSV Knee (4.7s).    Varicose Veins noted in the lateral thigh with significant reflux, however tortuous vessel makes it inaccessible for ablation at this time.     Advise thigh high pressure stockings, 20 to 30 mm of Hg.   Keep feet elevated.   Increase walking.     Arrange OV to review the results with the patient & make recommendations.

## 2024-02-14 DIAGNOSIS — M54.9 DORSALGIA: ICD-10-CM

## 2024-02-14 RX ORDER — GABAPENTIN 300 MG/1
300 CAPSULE ORAL 3 TIMES DAILY
Qty: 90 CAPSULE | Refills: 1 | Status: SHIPPED | OUTPATIENT
Start: 2024-02-14 | End: 2024-04-14

## 2024-02-14 RX ORDER — TIZANIDINE 4 MG/1
4 TABLET ORAL EVERY 8 HOURS PRN
Qty: 90 TABLET | Refills: 3 | Status: SHIPPED | OUTPATIENT
Start: 2024-02-14

## 2024-02-21 ENCOUNTER — OFFICE VISIT (OUTPATIENT)
Dept: CARDIOLOGY CLINIC | Age: 64
End: 2024-02-21
Payer: COMMERCIAL

## 2024-02-21 VITALS
HEART RATE: 76 BPM | BODY MASS INDEX: 34.33 KG/M2 | HEIGHT: 73 IN | SYSTOLIC BLOOD PRESSURE: 132 MMHG | DIASTOLIC BLOOD PRESSURE: 81 MMHG | WEIGHT: 259 LBS

## 2024-02-21 DIAGNOSIS — I87.2 CHRONIC VENOUS INSUFFICIENCY: ICD-10-CM

## 2024-02-21 DIAGNOSIS — E78.2 MIXED HYPERLIPIDEMIA: ICD-10-CM

## 2024-02-21 DIAGNOSIS — I10 ESSENTIAL HYPERTENSION: ICD-10-CM

## 2024-02-21 DIAGNOSIS — E11.65 TYPE 2 DIABETES MELLITUS WITH HYPERGLYCEMIA, WITHOUT LONG-TERM CURRENT USE OF INSULIN (HCC): ICD-10-CM

## 2024-02-21 DIAGNOSIS — I83.893 VARICOSE VEINS OF BOTH LEGS WITH EDEMA: Primary | ICD-10-CM

## 2024-02-21 PROCEDURE — 3075F SYST BP GE 130 - 139MM HG: CPT | Performed by: INTERNAL MEDICINE

## 2024-02-21 PROCEDURE — 3046F HEMOGLOBIN A1C LEVEL >9.0%: CPT | Performed by: INTERNAL MEDICINE

## 2024-02-21 PROCEDURE — 3079F DIAST BP 80-89 MM HG: CPT | Performed by: INTERNAL MEDICINE

## 2024-02-21 PROCEDURE — 99214 OFFICE O/P EST MOD 30 MIN: CPT | Performed by: INTERNAL MEDICINE

## 2024-02-21 NOTE — PROGRESS NOTES
OFFICE PROGRESS NOTES      Mauricio is a 63 y.o. male who has    CHIEF COMPLAINT AS FOLLOWS:  CHEST PAIN:  Continues to C/O chest pain.   SOB: No C/O SOB at this time.                LEG EDEMA: No leg edema But complains of prominent leg, restless legs, nighttime cramping, aching and fatigue legs.   PALPITATIONS: Denies any C/O Palpitations   DIZZINESS: No C/O Dizziness   SYNCOPE: None   OTHER/ ADDITIONAL COMPLAINTS:                                     HPI: Patient is here for F/U on his CVI, HTN & Dyslipidemia problems.   CVI: Significant Reflux disease.  HTN: Patient has known essential HTN. Has been treated with guideline recommended medical / physical/ diet therapy as stated below.  Dyslipidemia: Patient has known mixed dyslipidemia. Has been treated with guideline recommended medical / physical/ diet therapy as stated below.                Current Outpatient Medications   Medication Sig Dispense Refill    tiZANidine (ZANAFLEX) 4 MG tablet TAKE 1 TABLET BY MOUTH EVERY 8 HOURS AS NEEDED (BACK PAIN) 90 tablet 3    gabapentin (NEURONTIN) 300 MG capsule Take 1 capsule by mouth 3 times daily for 60 days. 90 capsule 1    atorvastatin (LIPITOR) 40 MG tablet TAKE 1 TABLET BY MOUTH NIGHTLY 30 tablet 3    metoprolol tartrate (LOPRESSOR) 25 MG tablet TAKE 1 TABLET BY MOUTH 2 TIMES DAILY 60 tablet 3    TRULICITY 1.5 MG/0.5ML SC injection INEJCT 1MG INTO THE SKIN EVERY 7 DAYS      LANTUS SOLOSTAR 100 UNIT/ML injection pen INJECT 52 UNITS INTO THE SKIN NIGHTLY 15 mL 3    empagliflozin (JARDIANCE) 25 MG tablet Take 1 tablet by mouth daily 30 tablet 3    tamsulosin (FLOMAX) 0.4 MG capsule TAKE 1 CAPSULE BY MOUTH DAILY 30 capsule 3    naproxen (NAPROSYN) 375 MG tablet TAKE 1 TABLET BY MOUTH TWICE A DAY WITH MEALS 60 tablet 3    dicyclomine (BENTYL) 10 MG capsule TAKE 1 CAPSULE BY MOUTH 4 TIMES DAILY 120 capsule 3    loratadine (CLARITIN) 10 MG tablet TAKE 1 TABLET BY MOUTH DAILY 30 tablet 3    lisinopril (PRINIVIL;ZESTRIL) 10 MG

## 2024-02-21 NOTE — PATIENT INSTRUCTIONS
CORONARY ARTERY DISEASE: Chest pain:Perfusion imaging reported to be normal but patient continues to C/O chest pain.                      ? Due to GERD or stress  HTN:Yes  well controlled on current medical regimen, see list above.  - changes in  treatment:  no, on Lisinopril & Metoprolol.     VHD:no              No significant VHD noted  DYSLIPIDEMIA: yes,   Patient's profile is at / near Goal.yes,   HDL is low   Tolerating current medical regimen wellyes, takes Lipitor  See most recent Lab values in Labs section above.     Diabetes mellitis:yes,   BS under good control No,   Hgb A1c avilable yes, 9.3     CHRONIC VENOUS INSUFFICIENCY:yes,               Patient has symptomatic C4 disease.   2/6/2024    No evidence of deep vein or superficial vein thrombosis in the Bilateral lower extremities. Vessels demonstrate normal compressibility, color filling, and phasic and spontaneous flow.    Significant venous reflux noted in the right CFV (1.2s). A tourniquet was utilized on the right lower extremity to reassess for significant reflux.    Significant venous reflux noted in the Left GSV Proximal Thigh (1.2s), GSV Mid Thigh (3.4s), and GSV Knee (4.7s).    Varicose Veins noted in the lateral thigh with significant reflux, however tortuous vessel makes it inaccessible for ablation at this time.               Will check Venous US & F/U.  Advised patient to continue to wear compression stockings.  Need to Diet Exercise & Loose weight.  Increase walking while wearing compression stockings.  Keep feet propped up while seated.     TESTS ORDERED:Ablation of Left GSV     PREVIOUSLY ORDERED TESTS REVIEWED & DISCUSSED WITH THE PATIENT:     I personally reviewed & interpreted, all previously ordered tests as copied above. Latest Labs are pulled in to the note with dates.   Labs, specially in Reference to Lipid profile, Cardiac testing in the form of Echo ( dated: ), stress tests ( dated: ) & other relevant cardiac testing reviewed

## 2024-02-27 ENCOUNTER — TELEPHONE (OUTPATIENT)
Dept: INTERNAL MEDICINE CLINIC | Age: 64
End: 2024-02-27

## 2024-02-27 RX ORDER — DULAGLUTIDE 3 MG/.5ML
3 INJECTION, SOLUTION SUBCUTANEOUS WEEKLY
Qty: 4 ADJUSTABLE DOSE PRE-FILLED PEN SYRINGE | Refills: 3 | Status: SHIPPED | OUTPATIENT
Start: 2024-02-27

## 2024-02-27 NOTE — TELEPHONE ENCOUNTER
Patients wife stated the pharmacy is having issues getting his trulicity 1.5 in because it is on back order. They could get in the 0.75 mg or the 3 mg if you wanted to change it. They would like your chose sent to [facility].    [Never] : never [TextBox_4] : Patient's started complaining of cough since she finished schooling in June patient has a initially productive cough with phlegm.  She was seen multiple times by ENT and was on many inhalers Flonase Astelin at 1 point she had a chest x-ray and was told she has antibiotic and was given a course of antibiotic.  She is still about the same sometimes she had pain underneath the eyes nose congestion and she is coughing no wheezing no chest pain no shortness of breath. [ESS] : 0

## 2024-02-28 ENCOUNTER — TELEPHONE (OUTPATIENT)
Dept: INTERNAL MEDICINE CLINIC | Age: 64
End: 2024-02-28

## 2024-02-28 DIAGNOSIS — E11.65 TYPE 2 DIABETES MELLITUS WITH HYPERGLYCEMIA, WITHOUT LONG-TERM CURRENT USE OF INSULIN (HCC): Primary | ICD-10-CM

## 2024-02-28 RX ORDER — GLUCOSAMINE HCL/CHONDROITIN SU 500-400 MG
1 CAPSULE ORAL 3 TIMES DAILY
Qty: 100 STRIP | Refills: 12 | Status: SHIPPED | OUTPATIENT
Start: 2024-02-28

## 2024-02-28 NOTE — TELEPHONE ENCOUNTER
Patient's Wife called stating that he needs test strips sent to [facility]. I do not see the test strips on the list.

## 2024-03-03 DIAGNOSIS — I10 ESSENTIAL HYPERTENSION: ICD-10-CM

## 2024-03-04 RX ORDER — LISINOPRIL 10 MG/1
10 TABLET ORAL DAILY
Qty: 30 TABLET | Refills: 3 | Status: SHIPPED | OUTPATIENT
Start: 2024-03-04

## 2024-03-09 DIAGNOSIS — E11.65 TYPE 2 DIABETES MELLITUS WITH HYPERGLYCEMIA, WITHOUT LONG-TERM CURRENT USE OF INSULIN (HCC): ICD-10-CM

## 2024-03-09 DIAGNOSIS — M54.9 DORSALGIA: ICD-10-CM

## 2024-03-09 DIAGNOSIS — R07.9 CHEST PAIN, UNSPECIFIED TYPE: ICD-10-CM

## 2024-03-11 RX ORDER — ASPIRIN 81 MG/1
TABLET, CHEWABLE ORAL
Qty: 30 TABLET | Refills: 3 | Status: SHIPPED | OUTPATIENT
Start: 2024-03-11

## 2024-03-11 RX ORDER — GABAPENTIN 300 MG/1
300 CAPSULE ORAL 3 TIMES DAILY
Qty: 90 CAPSULE | Refills: 1 | Status: SHIPPED | OUTPATIENT
Start: 2024-03-11 | End: 2024-05-10

## 2024-03-19 DIAGNOSIS — J30.9 ALLERGIC RHINITIS, UNSPECIFIED SEASONALITY, UNSPECIFIED TRIGGER: ICD-10-CM

## 2024-03-19 RX ORDER — LORATADINE 10 MG/1
10 TABLET ORAL DAILY
Qty: 30 TABLET | Refills: 3 | Status: SHIPPED | OUTPATIENT
Start: 2024-03-19

## 2024-03-20 ENCOUNTER — OFFICE VISIT (OUTPATIENT)
Dept: INTERNAL MEDICINE CLINIC | Age: 64
End: 2024-03-20
Payer: COMMERCIAL

## 2024-03-20 VITALS
SYSTOLIC BLOOD PRESSURE: 130 MMHG | OXYGEN SATURATION: 97 % | HEIGHT: 73 IN | DIASTOLIC BLOOD PRESSURE: 78 MMHG | BODY MASS INDEX: 33.4 KG/M2 | HEART RATE: 81 BPM | WEIGHT: 252 LBS

## 2024-03-20 DIAGNOSIS — E11.65 TYPE 2 DIABETES MELLITUS WITH HYPERGLYCEMIA, WITHOUT LONG-TERM CURRENT USE OF INSULIN (HCC): Primary | ICD-10-CM

## 2024-03-20 DIAGNOSIS — D69.6 THROMBOCYTOPENIA, UNSPECIFIED (HCC): ICD-10-CM

## 2024-03-20 DIAGNOSIS — Z23 NEED FOR VACCINATION WITH 20-POLYVALENT PNEUMOCOCCAL CONJUGATE VACCINE: ICD-10-CM

## 2024-03-20 DIAGNOSIS — K58.0 IRRITABLE BOWEL SYNDROME WITH DIARRHEA: ICD-10-CM

## 2024-03-20 DIAGNOSIS — M54.9 DORSALGIA: ICD-10-CM

## 2024-03-20 DIAGNOSIS — K21.9 GASTROESOPHAGEAL REFLUX DISEASE WITHOUT ESOPHAGITIS: ICD-10-CM

## 2024-03-20 DIAGNOSIS — E78.2 MIXED HYPERLIPIDEMIA: ICD-10-CM

## 2024-03-20 DIAGNOSIS — N52.9 ERECTILE DYSFUNCTION, UNSPECIFIED ERECTILE DYSFUNCTION TYPE: ICD-10-CM

## 2024-03-20 DIAGNOSIS — I10 ESSENTIAL HYPERTENSION: ICD-10-CM

## 2024-03-20 DIAGNOSIS — E55.9 VITAMIN D DEFICIENCY: ICD-10-CM

## 2024-03-20 DIAGNOSIS — J30.9 ALLERGIC RHINITIS, UNSPECIFIED SEASONALITY, UNSPECIFIED TRIGGER: ICD-10-CM

## 2024-03-20 LAB
CHP ED QC CHECK: NORMAL
GLUCOSE BLD-MCNC: 124 MG/DL
HBA1C MFR BLD: 7.3 %

## 2024-03-20 PROCEDURE — 3051F HG A1C>EQUAL 7.0%<8.0%: CPT | Performed by: INTERNAL MEDICINE

## 2024-03-20 PROCEDURE — 3078F DIAST BP <80 MM HG: CPT | Performed by: INTERNAL MEDICINE

## 2024-03-20 PROCEDURE — 83036 HEMOGLOBIN GLYCOSYLATED A1C: CPT | Performed by: INTERNAL MEDICINE

## 2024-03-20 PROCEDURE — 82962 GLUCOSE BLOOD TEST: CPT | Performed by: INTERNAL MEDICINE

## 2024-03-20 PROCEDURE — 99214 OFFICE O/P EST MOD 30 MIN: CPT | Performed by: INTERNAL MEDICINE

## 2024-03-20 PROCEDURE — 3075F SYST BP GE 130 - 139MM HG: CPT | Performed by: INTERNAL MEDICINE

## 2024-03-28 DIAGNOSIS — K21.9 GASTROESOPHAGEAL REFLUX DISEASE WITHOUT ESOPHAGITIS: ICD-10-CM

## 2024-03-28 RX ORDER — OMEPRAZOLE 40 MG/1
40 CAPSULE, DELAYED RELEASE ORAL DAILY
Qty: 30 CAPSULE | Refills: 3 | Status: SHIPPED | OUTPATIENT
Start: 2024-03-28

## 2024-04-30 DIAGNOSIS — R35.1 BENIGN PROSTATIC HYPERPLASIA WITH NOCTURIA: ICD-10-CM

## 2024-04-30 DIAGNOSIS — N40.1 BENIGN PROSTATIC HYPERPLASIA WITH NOCTURIA: ICD-10-CM

## 2024-04-30 DIAGNOSIS — M25.511 ACUTE PAIN OF RIGHT SHOULDER: ICD-10-CM

## 2024-04-30 DIAGNOSIS — E11.65 TYPE 2 DIABETES MELLITUS WITH HYPERGLYCEMIA, WITHOUT LONG-TERM CURRENT USE OF INSULIN (HCC): ICD-10-CM

## 2024-05-01 RX ORDER — NAPROXEN 375 MG/1
TABLET ORAL
Qty: 60 TABLET | Refills: 3 | Status: SHIPPED | OUTPATIENT
Start: 2024-05-01

## 2024-05-01 RX ORDER — TAMSULOSIN HYDROCHLORIDE 0.4 MG/1
0.4 CAPSULE ORAL DAILY
Qty: 30 CAPSULE | Refills: 3 | Status: SHIPPED | OUTPATIENT
Start: 2024-05-01

## 2024-05-01 RX ORDER — EMPAGLIFLOZIN 25 MG/1
25 TABLET, FILM COATED ORAL DAILY
Qty: 30 TABLET | Refills: 3 | Status: SHIPPED | OUTPATIENT
Start: 2024-05-01

## 2024-05-21 ENCOUNTER — TELEPHONE (OUTPATIENT)
Dept: INTERNAL MEDICINE CLINIC | Age: 64
End: 2024-05-21

## 2024-05-21 DIAGNOSIS — M54.9 DORSALGIA: Primary | ICD-10-CM

## 2024-05-21 RX ORDER — GABAPENTIN 600 MG/1
600 TABLET ORAL 2 TIMES DAILY
Qty: 60 TABLET | Refills: 2 | Status: SHIPPED | OUTPATIENT
Start: 2024-05-21 | End: 2024-08-19

## 2024-05-21 NOTE — TELEPHONE ENCOUNTER
Informed patient's wife. They will  the new script. He has no insurance at this time for any referrals. They are trying to get him back on insurance so he can see someone.

## 2024-05-21 NOTE — TELEPHONE ENCOUNTER
Patient 's Wife called stating that the gabepentin is not working for him and wants a higher dosage. Back is still in pain. Would like a call back.

## 2024-05-24 LAB — DIABETIC RETINOPATHY: NEGATIVE

## 2024-05-25 DIAGNOSIS — E11.65 TYPE 2 DIABETES MELLITUS WITH HYPERGLYCEMIA, WITHOUT LONG-TERM CURRENT USE OF INSULIN (HCC): ICD-10-CM

## 2024-05-28 RX ORDER — INSULIN GLARGINE 100 [IU]/ML
52 INJECTION, SOLUTION SUBCUTANEOUS NIGHTLY
Qty: 15 ML | Refills: 3 | Status: SHIPPED | OUTPATIENT
Start: 2024-05-28

## 2024-06-05 DIAGNOSIS — E78.2 MIXED HYPERLIPIDEMIA: ICD-10-CM

## 2024-06-05 DIAGNOSIS — I10 ESSENTIAL HYPERTENSION: ICD-10-CM

## 2024-06-05 RX ORDER — ATORVASTATIN CALCIUM 40 MG/1
TABLET, FILM COATED ORAL
Qty: 30 TABLET | Refills: 3 | Status: SHIPPED | OUTPATIENT
Start: 2024-06-05

## 2024-06-16 DIAGNOSIS — R07.9 CHEST PAIN, UNSPECIFIED TYPE: ICD-10-CM

## 2024-06-16 DIAGNOSIS — E11.65 TYPE 2 DIABETES MELLITUS WITH HYPERGLYCEMIA, WITHOUT LONG-TERM CURRENT USE OF INSULIN (HCC): ICD-10-CM

## 2024-06-17 RX ORDER — ASPIRIN 81 MG/1
TABLET, CHEWABLE ORAL
Qty: 30 TABLET | Refills: 3 | Status: SHIPPED | OUTPATIENT
Start: 2024-06-17

## 2024-06-25 SDOH — ECONOMIC STABILITY: FOOD INSECURITY: WITHIN THE PAST 12 MONTHS, YOU WORRIED THAT YOUR FOOD WOULD RUN OUT BEFORE YOU GOT MONEY TO BUY MORE.: SOMETIMES TRUE

## 2024-06-25 SDOH — ECONOMIC STABILITY: FOOD INSECURITY: WITHIN THE PAST 12 MONTHS, THE FOOD YOU BOUGHT JUST DIDN'T LAST AND YOU DIDN'T HAVE MONEY TO GET MORE.: NEVER TRUE

## 2024-06-25 SDOH — ECONOMIC STABILITY: INCOME INSECURITY: HOW HARD IS IT FOR YOU TO PAY FOR THE VERY BASICS LIKE FOOD, HOUSING, MEDICAL CARE, AND HEATING?: NOT VERY HARD

## 2024-06-25 SDOH — ECONOMIC STABILITY: TRANSPORTATION INSECURITY
IN THE PAST 12 MONTHS, HAS LACK OF TRANSPORTATION KEPT YOU FROM MEETINGS, WORK, OR FROM GETTING THINGS NEEDED FOR DAILY LIVING?: NO

## 2024-06-26 ENCOUNTER — OFFICE VISIT (OUTPATIENT)
Dept: INTERNAL MEDICINE CLINIC | Age: 64
End: 2024-06-26

## 2024-06-26 VITALS
BODY MASS INDEX: 34.43 KG/M2 | DIASTOLIC BLOOD PRESSURE: 90 MMHG | WEIGHT: 261 LBS | OXYGEN SATURATION: 98 % | SYSTOLIC BLOOD PRESSURE: 162 MMHG | HEART RATE: 53 BPM

## 2024-06-26 DIAGNOSIS — I10 ESSENTIAL HYPERTENSION: ICD-10-CM

## 2024-06-26 DIAGNOSIS — E11.65 TYPE 2 DIABETES MELLITUS WITH HYPERGLYCEMIA, WITHOUT LONG-TERM CURRENT USE OF INSULIN (HCC): Primary | ICD-10-CM

## 2024-06-26 DIAGNOSIS — N52.9 ERECTILE DYSFUNCTION, UNSPECIFIED ERECTILE DYSFUNCTION TYPE: ICD-10-CM

## 2024-06-26 DIAGNOSIS — M54.9 DORSALGIA: ICD-10-CM

## 2024-06-26 DIAGNOSIS — J30.9 ALLERGIC RHINITIS, UNSPECIFIED SEASONALITY, UNSPECIFIED TRIGGER: ICD-10-CM

## 2024-06-26 DIAGNOSIS — E78.2 MIXED HYPERLIPIDEMIA: ICD-10-CM

## 2024-06-26 DIAGNOSIS — K58.0 IRRITABLE BOWEL SYNDROME WITH DIARRHEA: ICD-10-CM

## 2024-06-26 DIAGNOSIS — K21.9 GASTROESOPHAGEAL REFLUX DISEASE WITHOUT ESOPHAGITIS: ICD-10-CM

## 2024-06-26 DIAGNOSIS — E55.9 VITAMIN D DEFICIENCY: ICD-10-CM

## 2024-06-26 PROBLEM — J01.00 ACUTE NON-RECURRENT MAXILLARY SINUSITIS: Status: RESOLVED | Noted: 2023-12-27 | Resolved: 2024-06-26

## 2024-06-26 PROBLEM — J20.9 ACUTE BRONCHITIS: Status: RESOLVED | Noted: 2023-12-27 | Resolved: 2024-06-26

## 2024-06-26 LAB
CHP ED QC CHECK: NORMAL
GLUCOSE BLD-MCNC: 195 MG/DL

## 2024-06-26 PROCEDURE — 3077F SYST BP >= 140 MM HG: CPT | Performed by: INTERNAL MEDICINE

## 2024-06-26 PROCEDURE — 3080F DIAST BP >= 90 MM HG: CPT | Performed by: INTERNAL MEDICINE

## 2024-06-26 PROCEDURE — 82962 GLUCOSE BLOOD TEST: CPT | Performed by: INTERNAL MEDICINE

## 2024-06-26 PROCEDURE — 99214 OFFICE O/P EST MOD 30 MIN: CPT | Performed by: INTERNAL MEDICINE

## 2024-06-26 PROCEDURE — 3051F HG A1C>EQUAL 7.0%<8.0%: CPT | Performed by: INTERNAL MEDICINE

## 2024-06-26 RX ORDER — INSULIN GLARGINE 100 [IU]/ML
32 INJECTION, SOLUTION SUBCUTANEOUS 2 TIMES DAILY
Qty: 15 ML | Refills: 3 | Status: SHIPPED | OUTPATIENT
Start: 2024-06-26

## 2024-06-26 SDOH — ECONOMIC STABILITY: FOOD INSECURITY: WITHIN THE PAST 12 MONTHS, YOU WORRIED THAT YOUR FOOD WOULD RUN OUT BEFORE YOU GOT MONEY TO BUY MORE.: NEVER TRUE

## 2024-06-26 SDOH — ECONOMIC STABILITY: INCOME INSECURITY: HOW HARD IS IT FOR YOU TO PAY FOR THE VERY BASICS LIKE FOOD, HOUSING, MEDICAL CARE, AND HEATING?: NOT HARD AT ALL

## 2024-06-26 SDOH — ECONOMIC STABILITY: FOOD INSECURITY: WITHIN THE PAST 12 MONTHS, THE FOOD YOU BOUGHT JUST DIDN'T LAST AND YOU DIDN'T HAVE MONEY TO GET MORE.: NEVER TRUE

## 2024-06-26 NOTE — PROGRESS NOTES
Name: Mauricio Blue  [id number]  Age: 64 y.o.  YOB: 1960  Sex: male    CHIEF COMPLAINT:    Chief Complaint   Patient presents with    Follow-up       HISTORY OF PRESENT ILLNESS:     This is a pleasant  64 y.o. male  is seen today for management of chronic medical problems and medications refills.  Previous records reviewed .    Patient claims that he is unable to afford Jardiance.  He lost his insurance.  He was self-insured but it was too expensive for him to have the insurance.  He is waiting for the Medicare to kick in in a year.  He is trying to watch his diet and doing exercise.     Patient claims that he is doing OK otherwise.  Right shoulder pain is better.  Hip pain is better.  But has mild chronic lower back pain and neck pain     He was seeing pain management doctor and getting injections in his neck and lower back periodically. But recently has not seen pain management.. he claims he has no appointments with them.  Taking naprosyn  and  Zanaflex and Tylenol as needed.     No more chest pains.  Stress test on 3/15/2021 was OK.     Does not use nitroglycerin sublingual but wants to keep it on hand.     He did see Dr. Sierra recently and he wanted a vein procedure for his  varicose veins but patient did not want it.  He is using Ace wrap and keeping his legs elevated at rest and that is helping him.     Denies CP , Denies SOB.  No fever , sore throat or cough or congestion.  Denies any abdominal pain.   Denies any gastritis symptoms.   Taking Prilosec regularly.   Appetite OK.  Bowels moving Ok.  Urinary symptoms are better with Flomax but is still he has nocturia, 2-3 times a night  He does see Dr. Whitlock periodically and apparently he has done a PSA several months ago and told him that it was okay.     Hearing is ok.  Vision Ok with glasses.  Denies  any significant skin lesions.  Denies any significant depression or anxiety.  Blood sugars are usually@ 130- 150 mostly but he checks it

## 2024-07-06 DIAGNOSIS — I10 ESSENTIAL HYPERTENSION: ICD-10-CM

## 2024-07-06 DIAGNOSIS — K58.0 IRRITABLE BOWEL SYNDROME WITH DIARRHEA: ICD-10-CM

## 2024-07-08 RX ORDER — LISINOPRIL 10 MG/1
10 TABLET ORAL DAILY
Qty: 30 TABLET | Refills: 3 | Status: SHIPPED | OUTPATIENT
Start: 2024-07-08

## 2024-07-08 RX ORDER — DICYCLOMINE HYDROCHLORIDE 10 MG/1
10 CAPSULE ORAL 4 TIMES DAILY
Qty: 120 CAPSULE | Refills: 3 | Status: SHIPPED | OUTPATIENT
Start: 2024-07-08

## 2024-07-15 DIAGNOSIS — M54.9 DORSALGIA: ICD-10-CM

## 2024-07-15 RX ORDER — GABAPENTIN 600 MG/1
600 TABLET ORAL 2 TIMES DAILY
Qty: 60 TABLET | Refills: 2 | Status: SHIPPED | OUTPATIENT
Start: 2024-07-15 | End: 2024-10-13

## 2024-07-19 DIAGNOSIS — K21.9 GASTROESOPHAGEAL REFLUX DISEASE WITHOUT ESOPHAGITIS: ICD-10-CM

## 2024-07-19 DIAGNOSIS — J30.9 ALLERGIC RHINITIS, UNSPECIFIED SEASONALITY, UNSPECIFIED TRIGGER: ICD-10-CM

## 2024-07-20 RX ORDER — LORATADINE 10 MG/1
10 TABLET ORAL DAILY
Qty: 30 TABLET | Refills: 3 | Status: SHIPPED | OUTPATIENT
Start: 2024-07-20

## 2024-07-20 RX ORDER — OMEPRAZOLE 40 MG/1
40 CAPSULE, DELAYED RELEASE ORAL DAILY
Qty: 30 CAPSULE | Refills: 3 | Status: SHIPPED | OUTPATIENT
Start: 2024-07-20

## 2024-08-02 DIAGNOSIS — R35.1 BENIGN PROSTATIC HYPERPLASIA WITH NOCTURIA: ICD-10-CM

## 2024-08-02 DIAGNOSIS — N40.1 BENIGN PROSTATIC HYPERPLASIA WITH NOCTURIA: ICD-10-CM

## 2024-08-02 DIAGNOSIS — M54.9 DORSALGIA: ICD-10-CM

## 2024-08-02 DIAGNOSIS — M25.511 ACUTE PAIN OF RIGHT SHOULDER: ICD-10-CM

## 2024-08-02 RX ORDER — TAMSULOSIN HYDROCHLORIDE 0.4 MG/1
0.4 CAPSULE ORAL DAILY
Qty: 30 CAPSULE | Refills: 3 | Status: SHIPPED | OUTPATIENT
Start: 2024-08-02

## 2024-08-02 RX ORDER — TIZANIDINE 4 MG/1
4 TABLET ORAL EVERY 8 HOURS PRN
Qty: 90 TABLET | Refills: 3 | Status: SHIPPED | OUTPATIENT
Start: 2024-08-02

## 2024-08-02 RX ORDER — NAPROXEN 375 MG/1
TABLET ORAL
Qty: 60 TABLET | Refills: 3 | Status: SHIPPED | OUTPATIENT
Start: 2024-08-02

## 2024-09-08 DIAGNOSIS — E11.65 TYPE 2 DIABETES MELLITUS WITH HYPERGLYCEMIA, WITHOUT LONG-TERM CURRENT USE OF INSULIN (HCC): ICD-10-CM

## 2024-09-09 RX ORDER — INSULIN GLARGINE 100 [IU]/ML
32 INJECTION, SOLUTION SUBCUTANEOUS 2 TIMES DAILY
Qty: 15 ML | Refills: 3 | Status: SHIPPED | OUTPATIENT
Start: 2024-09-09

## 2024-09-25 ENCOUNTER — OFFICE VISIT (OUTPATIENT)
Dept: INTERNAL MEDICINE CLINIC | Age: 64
End: 2024-09-25

## 2024-09-25 VITALS
WEIGHT: 269.4 LBS | HEART RATE: 65 BPM | DIASTOLIC BLOOD PRESSURE: 92 MMHG | HEIGHT: 73 IN | OXYGEN SATURATION: 98 % | BODY MASS INDEX: 35.7 KG/M2 | SYSTOLIC BLOOD PRESSURE: 130 MMHG

## 2024-09-25 DIAGNOSIS — K58.0 IRRITABLE BOWEL SYNDROME WITH DIARRHEA: ICD-10-CM

## 2024-09-25 DIAGNOSIS — E78.2 MIXED HYPERLIPIDEMIA: ICD-10-CM

## 2024-09-25 DIAGNOSIS — E55.9 VITAMIN D DEFICIENCY: ICD-10-CM

## 2024-09-25 DIAGNOSIS — I10 ESSENTIAL HYPERTENSION: ICD-10-CM

## 2024-09-25 DIAGNOSIS — K21.9 GASTROESOPHAGEAL REFLUX DISEASE WITHOUT ESOPHAGITIS: ICD-10-CM

## 2024-09-25 DIAGNOSIS — E11.65 TYPE 2 DIABETES MELLITUS WITH HYPERGLYCEMIA, WITHOUT LONG-TERM CURRENT USE OF INSULIN (HCC): Primary | ICD-10-CM

## 2024-09-25 DIAGNOSIS — M54.2 NECK PAIN: ICD-10-CM

## 2024-09-25 DIAGNOSIS — R35.1 BENIGN PROSTATIC HYPERPLASIA WITH NOCTURIA: ICD-10-CM

## 2024-09-25 DIAGNOSIS — N40.1 BENIGN PROSTATIC HYPERPLASIA WITH NOCTURIA: ICD-10-CM

## 2024-09-25 DIAGNOSIS — J30.9 ALLERGIC RHINITIS, UNSPECIFIED SEASONALITY, UNSPECIFIED TRIGGER: ICD-10-CM

## 2024-09-25 LAB
CHP ED QC CHECK: NORMAL
GLUCOSE BLD-MCNC: 252 MG/DL

## 2024-09-25 PROCEDURE — 3051F HG A1C>EQUAL 7.0%<8.0%: CPT | Performed by: INTERNAL MEDICINE

## 2024-09-25 PROCEDURE — 82962 GLUCOSE BLOOD TEST: CPT | Performed by: INTERNAL MEDICINE

## 2024-09-25 PROCEDURE — 3075F SYST BP GE 130 - 139MM HG: CPT | Performed by: INTERNAL MEDICINE

## 2024-09-25 PROCEDURE — 99213 OFFICE O/P EST LOW 20 MIN: CPT | Performed by: INTERNAL MEDICINE

## 2024-09-25 PROCEDURE — 3080F DIAST BP >= 90 MM HG: CPT | Performed by: INTERNAL MEDICINE

## 2024-09-25 RX ORDER — LISINOPRIL 20 MG/1
20 TABLET ORAL DAILY
Qty: 90 TABLET | Refills: 1 | Status: SHIPPED | OUTPATIENT
Start: 2024-09-25

## 2024-09-25 RX ORDER — INSULIN GLARGINE 100 [IU]/ML
32 INJECTION, SOLUTION SUBCUTANEOUS 2 TIMES DAILY
Qty: 15 ML | Refills: 3 | Status: SHIPPED | OUTPATIENT
Start: 2024-09-25

## 2024-10-04 DIAGNOSIS — I10 ESSENTIAL HYPERTENSION: ICD-10-CM

## 2024-10-04 DIAGNOSIS — E78.2 MIXED HYPERLIPIDEMIA: ICD-10-CM

## 2024-10-05 DIAGNOSIS — M54.9 DORSALGIA: ICD-10-CM

## 2024-10-07 RX ORDER — GABAPENTIN 600 MG/1
600 TABLET ORAL 2 TIMES DAILY
Qty: 60 TABLET | Refills: 2 | Status: SHIPPED | OUTPATIENT
Start: 2024-10-07 | End: 2025-01-05

## 2024-10-07 RX ORDER — ATORVASTATIN CALCIUM 40 MG/1
TABLET, FILM COATED ORAL
Qty: 30 TABLET | Refills: 3 | Status: SHIPPED | OUTPATIENT
Start: 2024-10-07

## 2024-10-07 RX ORDER — METOPROLOL TARTRATE 25 MG/1
25 TABLET, FILM COATED ORAL 2 TIMES DAILY
Qty: 60 TABLET | Refills: 3 | Status: SHIPPED | OUTPATIENT
Start: 2024-10-07

## 2024-10-13 DIAGNOSIS — R07.9 CHEST PAIN, UNSPECIFIED TYPE: ICD-10-CM

## 2024-10-14 RX ORDER — ASPIRIN 81 MG/1
TABLET, CHEWABLE ORAL
Qty: 30 TABLET | Refills: 3 | Status: SHIPPED | OUTPATIENT
Start: 2024-10-14

## 2024-10-26 DIAGNOSIS — E11.65 TYPE 2 DIABETES MELLITUS WITH HYPERGLYCEMIA, WITHOUT LONG-TERM CURRENT USE OF INSULIN (HCC): ICD-10-CM

## 2024-11-13 NOTE — PROGRESS NOTES
1. The patient's chronic low back pain is likely related to mechanical dysfunction.  2. I again encouraged the patient to discontinue his tobacco abuse.  This has adverse effects not only from a cardiovascular standpoint, but from an oncogenic perspective.  3. He has what appears to be an adhesive capsulitis, which will be treated with Meloxicam 15 mg q.d.  4. Screening colonoscopy will be scheduled.    
Chief Complaint   Patient presents with    Follow-up     \"Have you been to the ER, urgent care clinic since your last visit?  Hospitalized since your last visit?\"    NO    “Have you seen or consulted any other health care providers outside our system since your last visit?”    NO      “Have you had a colorectal cancer screening such as a colonoscopy/FIT/Cologuard?    NO    No colonoscopy on file  No cologuard on file  No FIT/FOBT on file   No flexible sigmoidoscopy on file            
Comes in for return visit, stating that he has done generally well.  He has had increasing pain, however, in his left shoulder.  There was nothing to aggravate this, however.    He unfortunately continues to abuse cigarettes.    He has some mild low back pain, which does not really interfere with his functional status.    Finally, he does need to have a colonoscopy done given his age of 47.    
  Substance and Sexual Activity    Alcohol use: Yes    Drug use: No    Sexual activity: Yes     Partners: Female     Social Determinants of Health     Financial Resource Strain: Low Risk  (9/30/2024)    Overall Financial Resource Strain (CARDIA)     Difficulty of Paying Living Expenses: Not hard at all   Food Insecurity: No Food Insecurity (9/30/2024)    Hunger Vital Sign     Worried About Running Out of Food in the Last Year: Never true     Ran Out of Food in the Last Year: Never true   Transportation Needs: Unknown (9/30/2024)    PRAPARE - Transportation     Lack of Transportation (Non-Medical): No   Housing Stability: Unknown (9/30/2024)    Housing Stability Vital Sign     Homeless in the Last Year: No     Family History   Problem Relation Age of Onset    Heart Attack Maternal Grandmother        OBJECTIVE:    BP (!) 174/87   Pulse (!) 43   Temp 98.4 °F (36.9 °C) (Oral)   Resp 16   Ht 1.803 m (5' 11\")   Wt 76.7 kg (169 lb)   SpO2 100%   BMI 23.57 kg/m²   CONSTITUTIONAL: well , well nourished, appears age appropriate  EYES: perrla, eom intact  ENMT:moist mucous membranes, pharynx clear  NECK: supple. Thyroid normal  RESPIRATORY: Chest: clear to ascultation and percussion   CARDIOVASCULAR: Heart: regular rate and rhythm  GASTROINTESTINAL: Abdomen: soft, bowel sounds active  HEMATOLOGIC: no pathological lymph nodes palpated  MUSCULOSKELETAL: Extremities: no edema, pulse 1+   INTEGUMENT: No unusual rashes or suspicious skin lesions noted. Nails appear normal.  NEUROLOGIC: non-focal exam   MENTAL STATUS: alert and oriented, appropriate affect      ASSESSMENT:  1. Chronic midline low back pain without sciatica    2. Tobacco abuse    3. Adhesive capsulitis of left shoulder    4. Colon cancer screening        PLAN:   Dictation on: 11/12/2024 10:17 PM by: FERNANDA MART [[id number]]     Orders Placed This Encounter   Procedures    [facility] - [facility]umar Ruth MD, [facility] ([address]     Referral 
  Insulin Pen Needle (ULTICARE MICRO PEN NEEDLES) 32G X 4 MM MISC USE 1 PEN NEEDLE DAILY AS DIRECTED 10/23/23  Yes Alex Pimentel MD   omeprazole (PRILOSEC) 40 MG delayed release capsule TAKE 1 CAPSULE BY MOUTH DAILY 8/4/23  Yes Alex Pimentel MD   Cholecalciferol (VITAMIN D3) 50 MCG (2000 UT) CAPS Take 1 capsule by mouth daily 7/21/23  Yes Alex Pimentel MD   nitroGLYCERIN (NITROSTAT) 0.4 MG SL tablet Place 1 tablet under the tongue every 5 minutes as needed for Chest pain up to max of 3 total doses. If no relief after 1 dose, call 911. 3/24/23  Yes Alex Pimentel MD       LAB DATA: Reviewed.    REVIEW OF SYSTEMS:   see HPI/ Comprehensive review of systems negative except for the ones mentioned in HPI.    PHYSICAL EXAMINATION:   /78 (Site: Left Upper Arm, Position: Sitting, Cuff Size: Medium Adult)   Pulse 81   Ht 1.854 m (6' 1\")   Wt 114.3 kg (252 lb)   SpO2 97%   BMI 33.25 kg/m²        GENERAL APPEARANCE:      Alert, oriented x 3, well developed, cooperative, not in any distress, appears stated age.  HEAD:                         Normocephalic, atraumatic   EYES:                          PERRLA, EOMI, lids normal, conjuctivea clear, sclera anicteric.   NECK:                         Supple, symmetrical,  trachea midline, no thyromegaly, no JVD, no lymphadenopathy.    LUNGS:                       Clear to auscultation bilaterally, respirations unlabored, accessory muscles are not used.  HEART:                       Regular rate and rhythm, S1 and S2 normal, no murmur, rub or gallop. PMI in MCL.  ABDOMEN:                 Soft, non-tender, bowel sounds are normoactive, no masses, no hepatospleenomegaly.  EXTREMITY:              no bipedal edema, has varicose veins bilaterally.   NEURO:                      Alert, oriented to person, place and time.                                      Grossly intact.  Musculoskeletal:         No kyphosis or scoliosis, mild tenderness in his lower back and in the back of

## 2024-11-15 DIAGNOSIS — J30.9 ALLERGIC RHINITIS, UNSPECIFIED SEASONALITY, UNSPECIFIED TRIGGER: ICD-10-CM

## 2024-11-18 RX ORDER — LORATADINE 10 MG/1
10 TABLET ORAL DAILY
Qty: 30 TABLET | Refills: 3 | Status: SHIPPED | OUTPATIENT
Start: 2024-11-18

## 2024-11-23 DIAGNOSIS — K21.9 GASTROESOPHAGEAL REFLUX DISEASE WITHOUT ESOPHAGITIS: ICD-10-CM

## 2024-11-25 RX ORDER — OMEPRAZOLE 40 MG/1
40 CAPSULE, DELAYED RELEASE ORAL DAILY
Qty: 30 CAPSULE | Refills: 3 | Status: SHIPPED | OUTPATIENT
Start: 2024-11-25

## 2024-11-29 DIAGNOSIS — N40.1 BENIGN PROSTATIC HYPERPLASIA WITH NOCTURIA: ICD-10-CM

## 2024-11-29 DIAGNOSIS — M25.511 ACUTE PAIN OF RIGHT SHOULDER: ICD-10-CM

## 2024-11-29 DIAGNOSIS — R35.1 BENIGN PROSTATIC HYPERPLASIA WITH NOCTURIA: ICD-10-CM

## 2024-12-02 RX ORDER — TAMSULOSIN HYDROCHLORIDE 0.4 MG/1
0.4 CAPSULE ORAL DAILY
Qty: 30 CAPSULE | Refills: 3 | Status: SHIPPED | OUTPATIENT
Start: 2024-12-02

## 2024-12-18 DIAGNOSIS — I10 ESSENTIAL HYPERTENSION: ICD-10-CM

## 2024-12-18 RX ORDER — LISINOPRIL 20 MG/1
20 TABLET ORAL DAILY
Qty: 90 TABLET | Refills: 1 | Status: SHIPPED | OUTPATIENT
Start: 2024-12-18

## 2024-12-22 DIAGNOSIS — M54.9 DORSALGIA: ICD-10-CM

## 2024-12-23 RX ORDER — GABAPENTIN 600 MG/1
TABLET ORAL
Qty: 60 TABLET | Refills: 2 | Status: SHIPPED | OUTPATIENT
Start: 2024-12-23 | End: 2025-03-23

## 2025-01-08 ENCOUNTER — OFFICE VISIT (OUTPATIENT)
Dept: INTERNAL MEDICINE CLINIC | Age: 65
End: 2025-01-08

## 2025-01-08 VITALS
BODY MASS INDEX: 37.87 KG/M2 | SYSTOLIC BLOOD PRESSURE: 136 MMHG | HEART RATE: 68 BPM | WEIGHT: 287 LBS | DIASTOLIC BLOOD PRESSURE: 88 MMHG | OXYGEN SATURATION: 94 %

## 2025-01-08 DIAGNOSIS — I10 ESSENTIAL HYPERTENSION: ICD-10-CM

## 2025-01-08 DIAGNOSIS — R35.1 BENIGN PROSTATIC HYPERPLASIA WITH NOCTURIA: ICD-10-CM

## 2025-01-08 DIAGNOSIS — K21.9 GASTROESOPHAGEAL REFLUX DISEASE WITHOUT ESOPHAGITIS: ICD-10-CM

## 2025-01-08 DIAGNOSIS — E78.2 MIXED HYPERLIPIDEMIA: ICD-10-CM

## 2025-01-08 DIAGNOSIS — J30.9 ALLERGIC RHINITIS, UNSPECIFIED SEASONALITY, UNSPECIFIED TRIGGER: ICD-10-CM

## 2025-01-08 DIAGNOSIS — K58.0 IRRITABLE BOWEL SYNDROME WITH DIARRHEA: ICD-10-CM

## 2025-01-08 DIAGNOSIS — N40.1 BENIGN PROSTATIC HYPERPLASIA WITH NOCTURIA: ICD-10-CM

## 2025-01-08 DIAGNOSIS — E55.9 VITAMIN D DEFICIENCY: ICD-10-CM

## 2025-01-08 DIAGNOSIS — M54.2 NECK PAIN: ICD-10-CM

## 2025-01-08 DIAGNOSIS — E11.65 TYPE 2 DIABETES MELLITUS WITH HYPERGLYCEMIA, WITHOUT LONG-TERM CURRENT USE OF INSULIN (HCC): Primary | ICD-10-CM

## 2025-01-08 LAB
CHP ED QC CHECK: NORMAL
GLUCOSE BLD-MCNC: 195 MG/DL
HBA1C MFR BLD: 10.8 %

## 2025-01-08 PROCEDURE — 3046F HEMOGLOBIN A1C LEVEL >9.0%: CPT | Performed by: INTERNAL MEDICINE

## 2025-01-08 PROCEDURE — 3079F DIAST BP 80-89 MM HG: CPT | Performed by: INTERNAL MEDICINE

## 2025-01-08 PROCEDURE — 83036 HEMOGLOBIN GLYCOSYLATED A1C: CPT | Performed by: INTERNAL MEDICINE

## 2025-01-08 PROCEDURE — 99214 OFFICE O/P EST MOD 30 MIN: CPT | Performed by: INTERNAL MEDICINE

## 2025-01-08 PROCEDURE — 82962 GLUCOSE BLOOD TEST: CPT | Performed by: INTERNAL MEDICINE

## 2025-01-08 PROCEDURE — 3075F SYST BP GE 130 - 139MM HG: CPT | Performed by: INTERNAL MEDICINE

## 2025-01-08 SDOH — ECONOMIC STABILITY: FOOD INSECURITY: WITHIN THE PAST 12 MONTHS, YOU WORRIED THAT YOUR FOOD WOULD RUN OUT BEFORE YOU GOT MONEY TO BUY MORE.: NEVER TRUE

## 2025-01-08 SDOH — ECONOMIC STABILITY: FOOD INSECURITY: WITHIN THE PAST 12 MONTHS, THE FOOD YOU BOUGHT JUST DIDN'T LAST AND YOU DIDN'T HAVE MONEY TO GET MORE.: NEVER TRUE

## 2025-01-08 ASSESSMENT — PATIENT HEALTH QUESTIONNAIRE - PHQ9
SUM OF ALL RESPONSES TO PHQ9 QUESTIONS 1 & 2: 0
SUM OF ALL RESPONSES TO PHQ QUESTIONS 1-9: 0
1. LITTLE INTEREST OR PLEASURE IN DOING THINGS: NOT AT ALL
SUM OF ALL RESPONSES TO PHQ QUESTIONS 1-9: 0
2. FEELING DOWN, DEPRESSED OR HOPELESS: NOT AT ALL

## 2025-01-08 NOTE — PROGRESS NOTES
inhaler Inhale 2 puffs into the lungs 4 times daily as needed for Wheezing 12/27/23  Yes Alex Pimentel MD   Insulin Pen Needle (ULTICARE MICRO PEN NEEDLES) 32G X 4 MM MISC USE 1 PEN NEEDLE DAILY AS DIRECTED 10/23/23  Yes Alex Pimentel MD   Cholecalciferol (VITAMIN D3) 50 MCG (2000 UT) CAPS Take 1 capsule by mouth daily 7/21/23  Yes lAex Pimentel MD   nitroGLYCERIN (NITROSTAT) 0.4 MG SL tablet Place 1 tablet under the tongue every 5 minutes as needed for Chest pain up to max of 3 total doses. If no relief after 1 dose, call 911. 3/24/23  Yes Alex Pimentel MD       LAB DATA: Reviewed.    REVIEW OF SYSTEMS:   see HPI/ Comprehensive review of systems negative except for the ones mentioned in HPI.    PHYSICAL EXAMINATION:   /88 (Site: Left Upper Arm, Position: Sitting, Cuff Size: Large Adult)   Pulse 68   Wt 130.2 kg (287 lb)   SpO2 94%   BMI 37.87 kg/m²      GENERAL APPEARANCE:      Alert, oriented x 3, well developed, cooperative, not in any distress, appears stated age.  HEAD:                         Normocephalic, atraumatic   EYES:                          PERRLA, EOMI, lids normal, conjuctivea clear, sclera anicteric.   NECK:                         Supple, symmetrical,  trachea midline, no thyromegaly, no JVD, no lymphadenopathy.    LUNGS:                       Clear to auscultation bilaterally, respirations unlabored, accessory muscles are not used.  HEART:                       Regular rate and rhythm, S1 and S2 normal, no murmur, rub or gallop. PMI in MCL.  ABDOMEN:                 Soft, non-tender, bowel sounds are normoactive, no masses, no hepatospleenomegaly..  Patient is obese  EXTREMITY:              no bipedal edema  NEURO:                      Alert, oriented to person, place and time.                                      Grossly intact.  Musculoskeletal:         No kyphosis or scoliosis, no deformity in any extremity noted, muscle strength and tone are normal..  Mild tenderness in his

## 2025-02-04 DIAGNOSIS — E11.65 TYPE 2 DIABETES MELLITUS WITH HYPERGLYCEMIA, WITHOUT LONG-TERM CURRENT USE OF INSULIN (HCC): ICD-10-CM

## 2025-02-05 RX ORDER — FLURBIPROFEN SODIUM 0.3 MG/ML
SOLUTION/ DROPS OPHTHALMIC
Qty: 100 EACH | Refills: 12 | Status: SHIPPED | OUTPATIENT
Start: 2025-02-05

## 2025-02-11 DIAGNOSIS — E11.65 TYPE 2 DIABETES MELLITUS WITH HYPERGLYCEMIA, WITHOUT LONG-TERM CURRENT USE OF INSULIN (HCC): ICD-10-CM

## 2025-02-25 DIAGNOSIS — I10 ESSENTIAL HYPERTENSION: ICD-10-CM

## 2025-02-25 RX ORDER — METOPROLOL TARTRATE 25 MG/1
25 TABLET, FILM COATED ORAL 2 TIMES DAILY
Qty: 60 TABLET | Refills: 3 | Status: SHIPPED | OUTPATIENT
Start: 2025-02-25

## 2025-03-05 DIAGNOSIS — M54.9 DORSALGIA: ICD-10-CM

## 2025-03-05 DIAGNOSIS — E78.2 MIXED HYPERLIPIDEMIA: ICD-10-CM

## 2025-03-05 RX ORDER — ATORVASTATIN CALCIUM 40 MG/1
TABLET, FILM COATED ORAL
Qty: 30 TABLET | Refills: 3 | Status: SHIPPED | OUTPATIENT
Start: 2025-03-05

## 2025-03-10 DIAGNOSIS — R07.9 CHEST PAIN, UNSPECIFIED TYPE: ICD-10-CM

## 2025-03-10 RX ORDER — ASPIRIN 81 MG/1
TABLET, CHEWABLE ORAL
Qty: 30 TABLET | Refills: 3 | Status: SHIPPED | OUTPATIENT
Start: 2025-03-10

## 2025-03-22 DIAGNOSIS — E11.65 TYPE 2 DIABETES MELLITUS WITH HYPERGLYCEMIA, WITHOUT LONG-TERM CURRENT USE OF INSULIN (HCC): ICD-10-CM

## 2025-03-24 RX ORDER — INSULIN GLARGINE 100 [IU]/ML
INJECTION, SOLUTION SUBCUTANEOUS
Qty: 15 ML | Refills: 3 | Status: SHIPPED | OUTPATIENT
Start: 2025-03-24

## 2025-03-30 DIAGNOSIS — K21.9 GASTROESOPHAGEAL REFLUX DISEASE WITHOUT ESOPHAGITIS: ICD-10-CM

## 2025-03-30 DIAGNOSIS — K58.0 IRRITABLE BOWEL SYNDROME WITH DIARRHEA: ICD-10-CM

## 2025-03-30 DIAGNOSIS — J30.9 ALLERGIC RHINITIS, UNSPECIFIED SEASONALITY, UNSPECIFIED TRIGGER: ICD-10-CM

## 2025-03-30 DIAGNOSIS — M54.9 DORSALGIA: ICD-10-CM

## 2025-03-31 RX ORDER — DICYCLOMINE HYDROCHLORIDE 10 MG/1
10 CAPSULE ORAL 4 TIMES DAILY
Qty: 120 CAPSULE | Refills: 3 | Status: SHIPPED | OUTPATIENT
Start: 2025-03-31

## 2025-03-31 RX ORDER — LORATADINE 10 MG/1
10 TABLET ORAL DAILY
Qty: 30 TABLET | Refills: 3 | Status: SHIPPED | OUTPATIENT
Start: 2025-03-31

## 2025-03-31 RX ORDER — OMEPRAZOLE 40 MG/1
40 CAPSULE, DELAYED RELEASE ORAL DAILY
Qty: 30 CAPSULE | Refills: 3 | Status: SHIPPED | OUTPATIENT
Start: 2025-03-31

## 2025-03-31 RX ORDER — GABAPENTIN 600 MG/1
600 TABLET ORAL 2 TIMES DAILY
Qty: 60 TABLET | Refills: 2 | Status: SHIPPED | OUTPATIENT
Start: 2025-03-31 | End: 2025-06-29

## 2025-04-22 DIAGNOSIS — R35.1 BENIGN PROSTATIC HYPERPLASIA WITH NOCTURIA: ICD-10-CM

## 2025-04-22 DIAGNOSIS — N40.1 BENIGN PROSTATIC HYPERPLASIA WITH NOCTURIA: ICD-10-CM

## 2025-04-22 RX ORDER — TAMSULOSIN HYDROCHLORIDE 0.4 MG/1
0.4 CAPSULE ORAL DAILY
Qty: 30 CAPSULE | Refills: 3 | Status: SHIPPED | OUTPATIENT
Start: 2025-04-22

## 2025-04-29 DIAGNOSIS — M25.511 ACUTE PAIN OF RIGHT SHOULDER: ICD-10-CM

## 2025-04-29 RX ORDER — NAPROXEN 375 MG/1
375 TABLET ORAL 2 TIMES DAILY WITH MEALS
Qty: 60 TABLET | Refills: 3 | Status: SHIPPED | OUTPATIENT
Start: 2025-04-29

## 2025-05-08 ENCOUNTER — RESULTS FOLLOW-UP (OUTPATIENT)
Dept: INTERNAL MEDICINE CLINIC | Age: 65
End: 2025-05-08

## 2025-05-08 ENCOUNTER — OFFICE VISIT (OUTPATIENT)
Dept: INTERNAL MEDICINE CLINIC | Age: 65
End: 2025-05-08
Payer: MEDICARE

## 2025-05-08 VITALS
WEIGHT: 280 LBS | HEART RATE: 55 BPM | SYSTOLIC BLOOD PRESSURE: 138 MMHG | DIASTOLIC BLOOD PRESSURE: 86 MMHG | OXYGEN SATURATION: 95 % | BODY MASS INDEX: 36.94 KG/M2

## 2025-05-08 DIAGNOSIS — E55.9 VITAMIN D DEFICIENCY: ICD-10-CM

## 2025-05-08 DIAGNOSIS — R06.00 DYSPNEA, UNSPECIFIED TYPE: ICD-10-CM

## 2025-05-08 DIAGNOSIS — M54.9 DORSALGIA: ICD-10-CM

## 2025-05-08 DIAGNOSIS — J30.9 ALLERGIC RHINITIS, UNSPECIFIED SEASONALITY, UNSPECIFIED TRIGGER: ICD-10-CM

## 2025-05-08 DIAGNOSIS — R35.1 BENIGN PROSTATIC HYPERPLASIA WITH NOCTURIA: ICD-10-CM

## 2025-05-08 DIAGNOSIS — K21.9 GASTROESOPHAGEAL REFLUX DISEASE WITHOUT ESOPHAGITIS: ICD-10-CM

## 2025-05-08 DIAGNOSIS — M54.2 NECK PAIN: ICD-10-CM

## 2025-05-08 DIAGNOSIS — E11.65 TYPE 2 DIABETES MELLITUS WITH HYPERGLYCEMIA, WITHOUT LONG-TERM CURRENT USE OF INSULIN (HCC): Primary | ICD-10-CM

## 2025-05-08 DIAGNOSIS — Z12.5 SCREENING FOR PROSTATE CANCER: ICD-10-CM

## 2025-05-08 DIAGNOSIS — K58.0 IRRITABLE BOWEL SYNDROME WITH DIARRHEA: ICD-10-CM

## 2025-05-08 DIAGNOSIS — I10 ESSENTIAL HYPERTENSION: ICD-10-CM

## 2025-05-08 DIAGNOSIS — M25.511 ACUTE PAIN OF RIGHT SHOULDER: ICD-10-CM

## 2025-05-08 DIAGNOSIS — J44.9 CHRONIC OBSTRUCTIVE PULMONARY DISEASE, UNSPECIFIED COPD TYPE (HCC): ICD-10-CM

## 2025-05-08 DIAGNOSIS — N40.1 BENIGN PROSTATIC HYPERPLASIA WITH NOCTURIA: ICD-10-CM

## 2025-05-08 DIAGNOSIS — J44.9 CHRONIC OBSTRUCTIVE PULMONARY DISEASE, UNSPECIFIED COPD TYPE (HCC): Primary | ICD-10-CM

## 2025-05-08 DIAGNOSIS — R07.9 CHEST PAIN, UNSPECIFIED TYPE: ICD-10-CM

## 2025-05-08 DIAGNOSIS — E78.2 MIXED HYPERLIPIDEMIA: ICD-10-CM

## 2025-05-08 LAB
ALBUMIN SERPL-MCNC: 4.4 G/DL (ref 3.4–5)
ALBUMIN/GLOB SERPL: 2.2 {RATIO} (ref 1.1–2.2)
ALP SERPL-CCNC: 103 U/L (ref 40–129)
ALT SERPL-CCNC: 35 U/L (ref 10–40)
ANION GAP SERPL CALCULATED.3IONS-SCNC: 11 MMOL/L (ref 3–16)
AST SERPL-CCNC: 25 U/L (ref 15–37)
BASOPHILS # BLD: 0 K/UL (ref 0–0.2)
BASOPHILS NFR BLD: 0.3 %
BILIRUB SERPL-MCNC: 0.7 MG/DL (ref 0–1)
BUN SERPL-MCNC: 12 MG/DL (ref 7–20)
CALCIUM SERPL-MCNC: 9.4 MG/DL (ref 8.3–10.6)
CHLORIDE SERPL-SCNC: 100 MMOL/L (ref 99–110)
CHOLEST SERPL-MCNC: 98 MG/DL (ref 0–199)
CHP ED QC CHECK: NORMAL
CO2 SERPL-SCNC: 26 MMOL/L (ref 21–32)
CREAT SERPL-MCNC: 0.6 MG/DL (ref 0.8–1.3)
CREAT UR-MCNC: 75.8 MG/DL (ref 39–259)
DEPRECATED RDW RBC AUTO: 13.4 % (ref 12.4–15.4)
EOSINOPHIL # BLD: 0.1 K/UL (ref 0–0.6)
EOSINOPHIL NFR BLD: 0.8 %
GFR SERPLBLD CREATININE-BSD FMLA CKD-EPI: >90 ML/MIN/{1.73_M2}
GLUCOSE BLD-MCNC: 251 MG/DL
GLUCOSE SERPL-MCNC: 273 MG/DL (ref 70–99)
HBA1C MFR BLD: 12.9 %
HCT VFR BLD AUTO: 47.4 % (ref 40.5–52.5)
HDLC SERPL-MCNC: 44 MG/DL (ref 40–60)
HGB BLD-MCNC: 16 G/DL (ref 13.5–17.5)
LDL CHOLESTEROL: 43 MG/DL
LYMPHOCYTES # BLD: 2.6 K/UL (ref 1–5.1)
LYMPHOCYTES NFR BLD: 25.9 %
MCH RBC QN AUTO: 31.2 PG (ref 26–34)
MCHC RBC AUTO-ENTMCNC: 33.7 G/DL (ref 31–36)
MCV RBC AUTO: 92.5 FL (ref 80–100)
MICROALBUMIN UR DL<=1MG/L-MCNC: <1.2 MG/DL
MICROALBUMIN/CREAT UR: NORMAL MG/G (ref 0–30)
MONOCYTES # BLD: 0.9 K/UL (ref 0–1.3)
MONOCYTES NFR BLD: 8.7 %
NEUTROPHILS # BLD: 6.3 K/UL (ref 1.7–7.7)
NEUTROPHILS NFR BLD: 64.3 %
PLATELET # BLD AUTO: 145 K/UL (ref 135–450)
PMV BLD AUTO: 9 FL (ref 5–10.5)
POTASSIUM SERPL-SCNC: 4.7 MMOL/L (ref 3.5–5.1)
PROT SERPL-MCNC: 6.4 G/DL (ref 6.4–8.2)
PSA SERPL DL<=0.01 NG/ML-MCNC: 1.34 NG/ML (ref 0–4)
RBC # BLD AUTO: 5.12 M/UL (ref 4.2–5.9)
SODIUM SERPL-SCNC: 137 MMOL/L (ref 136–145)
TRIGL SERPL-MCNC: 57 MG/DL (ref 0–150)
VLDLC SERPL CALC-MCNC: 11 MG/DL
WBC # BLD AUTO: 9.9 K/UL (ref 4–11)

## 2025-05-08 PROCEDURE — 99214 OFFICE O/P EST MOD 30 MIN: CPT | Performed by: INTERNAL MEDICINE

## 2025-05-08 PROCEDURE — 2022F DILAT RTA XM EVC RTNOPTHY: CPT | Performed by: INTERNAL MEDICINE

## 2025-05-08 PROCEDURE — 3023F SPIROM DOC REV: CPT | Performed by: INTERNAL MEDICINE

## 2025-05-08 PROCEDURE — 1123F ACP DISCUSS/DSCN MKR DOCD: CPT | Performed by: INTERNAL MEDICINE

## 2025-05-08 PROCEDURE — G8427 DOCREV CUR MEDS BY ELIG CLIN: HCPCS | Performed by: INTERNAL MEDICINE

## 2025-05-08 PROCEDURE — 3075F SYST BP GE 130 - 139MM HG: CPT | Performed by: INTERNAL MEDICINE

## 2025-05-08 PROCEDURE — 3017F COLORECTAL CA SCREEN DOC REV: CPT | Performed by: INTERNAL MEDICINE

## 2025-05-08 PROCEDURE — 82962 GLUCOSE BLOOD TEST: CPT | Performed by: INTERNAL MEDICINE

## 2025-05-08 PROCEDURE — 3046F HEMOGLOBIN A1C LEVEL >9.0%: CPT | Performed by: INTERNAL MEDICINE

## 2025-05-08 PROCEDURE — 3079F DIAST BP 80-89 MM HG: CPT | Performed by: INTERNAL MEDICINE

## 2025-05-08 PROCEDURE — 83036 HEMOGLOBIN GLYCOSYLATED A1C: CPT | Performed by: INTERNAL MEDICINE

## 2025-05-08 PROCEDURE — G8417 CALC BMI ABV UP PARAM F/U: HCPCS | Performed by: INTERNAL MEDICINE

## 2025-05-08 PROCEDURE — 1036F TOBACCO NON-USER: CPT | Performed by: INTERNAL MEDICINE

## 2025-05-08 PROCEDURE — 36415 COLL VENOUS BLD VENIPUNCTURE: CPT | Performed by: INTERNAL MEDICINE

## 2025-05-08 RX ORDER — ASPIRIN 81 MG/1
TABLET, CHEWABLE ORAL
Qty: 30 TABLET | Refills: 3 | Status: SHIPPED | OUTPATIENT
Start: 2025-05-08

## 2025-05-08 RX ORDER — ALBUTEROL SULFATE 90 UG/1
2 INHALANT RESPIRATORY (INHALATION) 4 TIMES DAILY PRN
Qty: 54 G | Refills: 1 | Status: SHIPPED | OUTPATIENT
Start: 2025-05-08

## 2025-05-08 RX ORDER — TAMSULOSIN HYDROCHLORIDE 0.4 MG/1
0.4 CAPSULE ORAL DAILY
Qty: 30 CAPSULE | Refills: 3 | Status: SHIPPED | OUTPATIENT
Start: 2025-05-08

## 2025-05-08 RX ORDER — GABAPENTIN 600 MG/1
600 TABLET ORAL 2 TIMES DAILY
Qty: 60 TABLET | Refills: 2 | Status: SHIPPED | OUTPATIENT
Start: 2025-05-08 | End: 2025-08-06

## 2025-05-08 RX ORDER — DAPAGLIFLOZIN 10 MG/1
10 TABLET, FILM COATED ORAL EVERY MORNING
Qty: 90 TABLET | Refills: 0 | Status: SHIPPED | OUTPATIENT
Start: 2025-05-08

## 2025-05-08 RX ORDER — FLURBIPROFEN SODIUM 0.3 MG/ML
SOLUTION/ DROPS OPHTHALMIC
Qty: 100 EACH | Refills: 12 | Status: SHIPPED | OUTPATIENT
Start: 2025-05-08

## 2025-05-08 RX ORDER — ACETAMINOPHEN 160 MG
2000 TABLET,DISINTEGRATING ORAL
Qty: 30 CAPSULE | Refills: 3 | Status: SHIPPED | OUTPATIENT
Start: 2025-05-08

## 2025-05-08 RX ORDER — LORATADINE 10 MG/1
10 TABLET ORAL DAILY
Qty: 30 TABLET | Refills: 3 | Status: SHIPPED | OUTPATIENT
Start: 2025-05-08

## 2025-05-08 RX ORDER — NITROGLYCERIN 0.4 MG/1
0.4 TABLET SUBLINGUAL EVERY 5 MIN PRN
Qty: 25 TABLET | Refills: 3 | Status: SHIPPED | OUTPATIENT
Start: 2025-05-08

## 2025-05-08 RX ORDER — METFORMIN HYDROCHLORIDE 500 MG/1
1000 TABLET, EXTENDED RELEASE ORAL 2 TIMES DAILY
Qty: 120 TABLET | Refills: 3 | Status: SHIPPED | OUTPATIENT
Start: 2025-05-08

## 2025-05-08 RX ORDER — INSULIN GLARGINE 100 [IU]/ML
50 INJECTION, SOLUTION SUBCUTANEOUS 2 TIMES DAILY
Qty: 10 ML | Refills: 3 | Status: SHIPPED | OUTPATIENT
Start: 2025-05-08

## 2025-05-08 RX ORDER — OMEPRAZOLE 40 MG/1
40 CAPSULE, DELAYED RELEASE ORAL DAILY
Qty: 30 CAPSULE | Refills: 3 | Status: SHIPPED | OUTPATIENT
Start: 2025-05-08

## 2025-05-08 RX ORDER — METOPROLOL TARTRATE 25 MG/1
25 TABLET, FILM COATED ORAL 2 TIMES DAILY
Qty: 60 TABLET | Refills: 3 | Status: SHIPPED | OUTPATIENT
Start: 2025-05-08

## 2025-05-08 RX ORDER — LISINOPRIL 20 MG/1
20 TABLET ORAL DAILY
Qty: 90 TABLET | Refills: 1 | Status: SHIPPED | OUTPATIENT
Start: 2025-05-08

## 2025-05-08 RX ORDER — DICYCLOMINE HYDROCHLORIDE 10 MG/1
10 CAPSULE ORAL 4 TIMES DAILY
Qty: 120 CAPSULE | Refills: 3 | Status: SHIPPED | OUTPATIENT
Start: 2025-05-08

## 2025-05-08 RX ORDER — NAPROXEN 375 MG/1
375 TABLET ORAL 2 TIMES DAILY WITH MEALS
Qty: 60 TABLET | Refills: 3 | Status: SHIPPED | OUTPATIENT
Start: 2025-05-08

## 2025-05-08 RX ORDER — ATORVASTATIN CALCIUM 40 MG/1
TABLET, FILM COATED ORAL
Qty: 30 TABLET | Refills: 3 | Status: SHIPPED | OUTPATIENT
Start: 2025-05-08

## 2025-05-08 NOTE — PROGRESS NOTES
lower back  Skin:                            Warm and dry. No rash or obvious suspicious lesions.    PSYCH:  Mood euthymic, insight and judgement good.          ASSESSMENT/PLAN:    1. Type 2 diabetes mellitus with hyperglycemia, without long-term current use of insulin (HCC)  Will change medications for diabetes.  Will start on metformin  mg 2 tablets twice daily.  Increase Lantus to 50 units twice daily.  Add Farxiga 10 mg daily.  Referred to endocrinologist.  Advise low sugar diet, exercise and weight loss  - POCT glycosylated hemoglobin (Hb A1C)  - POCT Glucose  - Albumin/Creatinine Ratio, Urine  - Amb External Referral To Endocrinology  - insulin glargine (LANTUS SOLOSTAR) 100 UNIT/ML injection pen; Inject 50 Units into the skin 2 times daily  Dispense: 10 mL; Refill: 3  - metFORMIN (GLUCOPHAGE-XR) 500 MG extended release tablet; Take 2 tablets by mouth 2 times daily  Dispense: 120 tablet; Refill: 3  - Insulin Pen Needle ([employer] SAFEPACK PEN NEEDLE) 32G X 4 MM MISC; USE 1 PEN NEEDLE DAILY AS DIRECTED  Dispense: 100 each; Refill: 12  - dapagliflozin (FARXIGA) 10 MG tablet; Take 1 tablet by mouth every morning  Dispense: 90 tablet; Refill: 0    2. Essential hypertension  Continue current medications, denies side effect with medicationss.  Low salt diet and exercise advised.  Continue lisinopril and Lopressor  - CBC with Auto Differential  - Comprehensive Metabolic Panel  - lisinopril (PRINIVIL;ZESTRIL) 20 MG tablet; Take 1 tablet by mouth daily  Dispense: 90 tablet; Refill: 1  - metoprolol tartrate (LOPRESSOR) 25 MG tablet; Take 1 tablet by mouth 2 times daily  Dispense: 60 tablet; Refill: 3    3. Mixed hyperlipidemia  Patient is taking cholesterol medications regularly.  Denies any side effects.  Diet and exercise advised.  Continue Lipitor  - Lipid, Fasting  - atorvastatin (LIPITOR) 40 MG tablet; TAKE 1 TABLET BY MOUTH NIGHTLY  Dispense: 30 tablet; Refill: 3    4. Allergic rhinitis, unspecified

## 2025-05-23 ENCOUNTER — TELEPHONE (OUTPATIENT)
Dept: INTERNAL MEDICINE CLINIC | Age: 65
End: 2025-05-23

## 2025-05-23 NOTE — TELEPHONE ENCOUNTER
Patient called asking for 2 boxes of Lancets because the patient uses 1 box in two weeks but would like to  a monthly supply when he makes the trip to the pharmacy.

## 2025-05-27 ENCOUNTER — OFFICE VISIT (OUTPATIENT)
Dept: PULMONOLOGY | Age: 65
End: 2025-05-27
Payer: MEDICARE

## 2025-05-27 VITALS
BODY MASS INDEX: 37.91 KG/M2 | OXYGEN SATURATION: 94 % | HEART RATE: 64 BPM | HEIGHT: 73 IN | DIASTOLIC BLOOD PRESSURE: 78 MMHG | SYSTOLIC BLOOD PRESSURE: 126 MMHG | WEIGHT: 286 LBS

## 2025-05-27 DIAGNOSIS — R06.09 DYSPNEA ON EXERTION: ICD-10-CM

## 2025-05-27 DIAGNOSIS — R91.8 PULMONARY NODULES: ICD-10-CM

## 2025-05-27 DIAGNOSIS — Z80.1 FAMILY HISTORY OF LUNG CANCER: ICD-10-CM

## 2025-05-27 DIAGNOSIS — J45.20 MILD INTERMITTENT ASTHMA WITHOUT COMPLICATION: Primary | ICD-10-CM

## 2025-05-27 DIAGNOSIS — E11.65 TYPE 2 DIABETES MELLITUS WITH HYPERGLYCEMIA, WITHOUT LONG-TERM CURRENT USE OF INSULIN (HCC): Primary | ICD-10-CM

## 2025-05-27 PROCEDURE — 3074F SYST BP LT 130 MM HG: CPT | Performed by: INTERNAL MEDICINE

## 2025-05-27 PROCEDURE — G8417 CALC BMI ABV UP PARAM F/U: HCPCS | Performed by: INTERNAL MEDICINE

## 2025-05-27 PROCEDURE — 3017F COLORECTAL CA SCREEN DOC REV: CPT | Performed by: INTERNAL MEDICINE

## 2025-05-27 PROCEDURE — 99213 OFFICE O/P EST LOW 20 MIN: CPT | Performed by: INTERNAL MEDICINE

## 2025-05-27 PROCEDURE — 1123F ACP DISCUSS/DSCN MKR DOCD: CPT | Performed by: INTERNAL MEDICINE

## 2025-05-27 PROCEDURE — 1036F TOBACCO NON-USER: CPT | Performed by: INTERNAL MEDICINE

## 2025-05-27 PROCEDURE — 3078F DIAST BP <80 MM HG: CPT | Performed by: INTERNAL MEDICINE

## 2025-05-27 PROCEDURE — G8427 DOCREV CUR MEDS BY ELIG CLIN: HCPCS | Performed by: INTERNAL MEDICINE

## 2025-05-27 RX ORDER — AVOBENZONE, HOMOSALATE, OCTISALATE, OCTOCRYLENE 30; 40; 45; 26 MG/ML; MG/ML; MG/ML; MG/ML
1 CREAM TOPICAL 3 TIMES DAILY
Qty: 600 EACH | Refills: 1 | Status: SHIPPED | OUTPATIENT
Start: 2025-05-27

## 2025-05-27 NOTE — PROGRESS NOTES
SUBJECTIVE:  Chief Complaint: Mild intermittent asthma, multiple lung nodules, hilar adenopathy, mild dyspnea on exertion, family history of lung cancer  Fiordaliza is always been a non-smoker but did have significant secondary smoke exposure and mentions that his father  of lung cancer.  Melvin has been diagnosed with mild COPD but he had normal pulmonary function tests and  and I suspect his diagnosis is more likely consistent with mild intermittent asthma.  He has been placed on Spiriva but does not use it on a regular basis and he also uses albuterol rescue inhaler and never more than once or twice every 2 to 3 weeks.  He denies any recent episodes of bronchitis.  He does carry history of multiple pulmonary nodules last seen on the CT chest in .  On that CT chest there was no significant hilar or mediastinal adenopathy.  He denies hemoptysis or chest pain and has had no episodes of pleurisy.  He does note mild dyspnea on exertion but typically is not short of breath at rest.  He does not complain of chronic cough      ROS:  Constitution:  HEENT: Negative for ear, throat pain  Cardiovascular: Negative for chest pain, syncope, edema  Pulmonary: See HPI  Musculoskeletal: Negative for DVT, myalgias, arthralgias    OBJECTIVE:  /78   Pulse 64   Ht 1.854 m (6' 1\")   Wt 129.7 kg (286 lb)   SpO2 94%   BMI 37.73 kg/m²      Physical Exam:  Constitutional:  He appears well developed and well-nourished.  Moderately overweight.  Not in respiratory distress at rest  Neck:  Supple, No palpable lymphadenopathy, No JVD  Cardiovascular:  S1, S2 Normal, Regular rhythm, no murmurs or gallops, No pericardial  rubs.  Pulmonary: Breath sounds are clear throughout all areas without wheezing or rhonchi  Abdomen: Not examined  Extremities: no edema, No DVT  Neurologic: Oriented x3, No focal deficits    Radiology: CT chest with contrast 2022 showed stable bilateral pulm nodules some of which demonstrate cavitation

## 2025-06-06 DIAGNOSIS — E11.65 TYPE 2 DIABETES MELLITUS WITH HYPERGLYCEMIA, WITHOUT LONG-TERM CURRENT USE OF INSULIN (HCC): ICD-10-CM

## 2025-06-06 NOTE — TELEPHONE ENCOUNTER
Patient's wife called asking for the prescription for Insulin Glargine to state; Dispense: 30ML for the pharmacy to refill the medication and resend to the pharmacy

## 2025-06-08 RX ORDER — INSULIN GLARGINE 100 [IU]/ML
50 INJECTION, SOLUTION SUBCUTANEOUS 2 TIMES DAILY
Qty: 30 ML | Refills: 3 | Status: SHIPPED | OUTPATIENT
Start: 2025-06-08

## 2025-06-10 ENCOUNTER — OFFICE VISIT (OUTPATIENT)
Dept: INTERNAL MEDICINE CLINIC | Age: 65
End: 2025-06-10
Payer: MEDICARE

## 2025-06-10 VITALS
OXYGEN SATURATION: 94 % | BODY MASS INDEX: 37.68 KG/M2 | WEIGHT: 285.6 LBS | HEART RATE: 64 BPM | DIASTOLIC BLOOD PRESSURE: 106 MMHG | SYSTOLIC BLOOD PRESSURE: 158 MMHG

## 2025-06-10 DIAGNOSIS — K21.9 GASTROESOPHAGEAL REFLUX DISEASE WITHOUT ESOPHAGITIS: ICD-10-CM

## 2025-06-10 DIAGNOSIS — E11.65 TYPE 2 DIABETES MELLITUS WITH HYPERGLYCEMIA, WITHOUT LONG-TERM CURRENT USE OF INSULIN (HCC): Primary | ICD-10-CM

## 2025-06-10 DIAGNOSIS — R35.1 BENIGN PROSTATIC HYPERPLASIA WITH NOCTURIA: ICD-10-CM

## 2025-06-10 DIAGNOSIS — K58.0 IRRITABLE BOWEL SYNDROME WITH DIARRHEA: ICD-10-CM

## 2025-06-10 DIAGNOSIS — E78.2 MIXED HYPERLIPIDEMIA: ICD-10-CM

## 2025-06-10 DIAGNOSIS — J30.9 ALLERGIC RHINITIS, UNSPECIFIED SEASONALITY, UNSPECIFIED TRIGGER: ICD-10-CM

## 2025-06-10 DIAGNOSIS — M54.9 DORSALGIA: ICD-10-CM

## 2025-06-10 DIAGNOSIS — I83.893 VARICOSE VEINS OF BOTH LEGS WITH EDEMA: ICD-10-CM

## 2025-06-10 DIAGNOSIS — J45.20 MILD INTERMITTENT ASTHMA, UNSPECIFIED WHETHER COMPLICATED: ICD-10-CM

## 2025-06-10 DIAGNOSIS — I10 ESSENTIAL HYPERTENSION: ICD-10-CM

## 2025-06-10 DIAGNOSIS — E55.9 VITAMIN D DEFICIENCY: ICD-10-CM

## 2025-06-10 DIAGNOSIS — M54.2 NECK PAIN: ICD-10-CM

## 2025-06-10 DIAGNOSIS — N40.1 BENIGN PROSTATIC HYPERPLASIA WITH NOCTURIA: ICD-10-CM

## 2025-06-10 DIAGNOSIS — R07.9 CHEST PAIN, UNSPECIFIED TYPE: ICD-10-CM

## 2025-06-10 LAB
CHP ED QC CHECK: NORMAL
GLUCOSE BLD-MCNC: 149 MG/DL

## 2025-06-10 PROCEDURE — 3078F DIAST BP <80 MM HG: CPT | Performed by: INTERNAL MEDICINE

## 2025-06-10 PROCEDURE — 82962 GLUCOSE BLOOD TEST: CPT | Performed by: INTERNAL MEDICINE

## 2025-06-10 PROCEDURE — G8417 CALC BMI ABV UP PARAM F/U: HCPCS | Performed by: INTERNAL MEDICINE

## 2025-06-10 PROCEDURE — 3017F COLORECTAL CA SCREEN DOC REV: CPT | Performed by: INTERNAL MEDICINE

## 2025-06-10 PROCEDURE — 3077F SYST BP >= 140 MM HG: CPT | Performed by: INTERNAL MEDICINE

## 2025-06-10 PROCEDURE — 1036F TOBACCO NON-USER: CPT | Performed by: INTERNAL MEDICINE

## 2025-06-10 PROCEDURE — 99214 OFFICE O/P EST MOD 30 MIN: CPT | Performed by: INTERNAL MEDICINE

## 2025-06-10 PROCEDURE — 2022F DILAT RTA XM EVC RTNOPTHY: CPT | Performed by: INTERNAL MEDICINE

## 2025-06-10 PROCEDURE — 3046F HEMOGLOBIN A1C LEVEL >9.0%: CPT | Performed by: INTERNAL MEDICINE

## 2025-06-10 PROCEDURE — 1123F ACP DISCUSS/DSCN MKR DOCD: CPT | Performed by: INTERNAL MEDICINE

## 2025-06-10 PROCEDURE — G8427 DOCREV CUR MEDS BY ELIG CLIN: HCPCS | Performed by: INTERNAL MEDICINE

## 2025-06-10 RX ORDER — FLUTICASONE PROPIONATE 110 UG/1
2 AEROSOL, METERED RESPIRATORY (INHALATION) 2 TIMES DAILY
Qty: 1 EACH | Refills: 3 | Status: SHIPPED | OUTPATIENT
Start: 2025-06-10 | End: 2026-06-10

## 2025-06-10 RX ORDER — LOSARTAN POTASSIUM AND HYDROCHLOROTHIAZIDE 12.5; 1 MG/1; MG/1
1 TABLET ORAL DAILY
Qty: 30 TABLET | Refills: 3 | Status: SHIPPED | OUTPATIENT
Start: 2025-06-10

## 2025-06-10 NOTE — PROGRESS NOTES
esophagitis  Patient on Prilosec    6. Irritable bowel syndrome with diarrhea  Continue Bentyl as needed    7. Varicose veins of both legs with edema  Advised to use compression stocking keep legs elevated at rest    8. Vitamin D deficiency  Continue vitamin D3    9. Dorsalgia  Continue Zanaflex and Neurontin and Tylenol as needed    10. Neck pain  As mentioned above    11. Chest pain, unspecified type  Not bothering him much now    12. Benign prostatic hyperplasia with nocturia  Continue Flomax and advised to continue to follow with his urologist as per his recommendations    13. Mild intermittent asthma, unspecified whether complicated  Will DC Spiriva and start him on Flovent 110 2 puffs twice daily and continue albuterol HFA as needed  - fluticasone (FLOVENT HFA) 110 MCG/ACT inhaler; Inhale 2 puffs into the lungs 2 times daily  Dispense: 1 each; Refill: 3        Care discussed with patient. Questions answered and patient verbalizes understanding and agrees with plan.   Medications reviewed and reconciled. Continue current medications.  Appropriate prescriptions are ordered. Risks and benefits of meds are discussed.     After visit summary provided.    Advised to call for any problems, questions, or concerns.   If symptoms worsen or don't improve as expected, to call us or go to ER.    Follow up as directed, sooner if needed.      No follow-ups on file.    This dictation was performed with a verbal recognition program and it was checked for errors. It is possible that there are still dictated errors within this office note. Any errors should be brought immediately to my attention for correction. All efforts were made to ensure that this office note is accurate.     SUNNY REDD MD MD

## 2025-06-13 ENCOUNTER — TELEPHONE (OUTPATIENT)
Dept: INTERNAL MEDICINE CLINIC | Age: 65
End: 2025-06-13

## 2025-06-13 DIAGNOSIS — I10 ESSENTIAL HYPERTENSION: ICD-10-CM

## 2025-06-13 NOTE — TELEPHONE ENCOUNTER
[facility] called stating that Bactrin was prescribed from another doctor and wanted Dr. Pimentel to know that it interacts with Losartan and is inquiring if the medication should be filled.

## 2025-06-17 RX ORDER — LOSARTAN POTASSIUM AND HYDROCHLOROTHIAZIDE 12.5; 1 MG/1; MG/1
1 TABLET ORAL DAILY
Qty: 30 TABLET | Refills: 3 | Status: SHIPPED | OUTPATIENT
Start: 2025-06-17

## 2025-06-18 ENCOUNTER — OFFICE VISIT (OUTPATIENT)
Dept: CARDIOLOGY CLINIC | Age: 65
End: 2025-06-18
Payer: MEDICARE

## 2025-06-18 VITALS
HEIGHT: 73 IN | WEIGHT: 281.2 LBS | SYSTOLIC BLOOD PRESSURE: 122 MMHG | BODY MASS INDEX: 37.27 KG/M2 | DIASTOLIC BLOOD PRESSURE: 76 MMHG | HEART RATE: 68 BPM

## 2025-06-18 DIAGNOSIS — E78.2 MIXED HYPERLIPIDEMIA: ICD-10-CM

## 2025-06-18 DIAGNOSIS — I10 ESSENTIAL HYPERTENSION: Primary | ICD-10-CM

## 2025-06-18 DIAGNOSIS — E66.01 MORBID (SEVERE) OBESITY DUE TO EXCESS CALORIES (HCC): ICD-10-CM

## 2025-06-18 DIAGNOSIS — E11.65 TYPE 2 DIABETES MELLITUS WITH HYPERGLYCEMIA, WITHOUT LONG-TERM CURRENT USE OF INSULIN (HCC): ICD-10-CM

## 2025-06-18 DIAGNOSIS — I87.2 CHRONIC VENOUS INSUFFICIENCY: ICD-10-CM

## 2025-06-18 DIAGNOSIS — I83.893 VARICOSE VEINS OF BOTH LEGS WITH EDEMA: ICD-10-CM

## 2025-06-18 PROCEDURE — 3046F HEMOGLOBIN A1C LEVEL >9.0%: CPT | Performed by: INTERNAL MEDICINE

## 2025-06-18 PROCEDURE — G8427 DOCREV CUR MEDS BY ELIG CLIN: HCPCS | Performed by: INTERNAL MEDICINE

## 2025-06-18 PROCEDURE — 93000 ELECTROCARDIOGRAM COMPLETE: CPT | Performed by: INTERNAL MEDICINE

## 2025-06-18 PROCEDURE — 3017F COLORECTAL CA SCREEN DOC REV: CPT | Performed by: INTERNAL MEDICINE

## 2025-06-18 PROCEDURE — 2022F DILAT RTA XM EVC RTNOPTHY: CPT | Performed by: INTERNAL MEDICINE

## 2025-06-18 PROCEDURE — 3078F DIAST BP <80 MM HG: CPT | Performed by: INTERNAL MEDICINE

## 2025-06-18 PROCEDURE — G8417 CALC BMI ABV UP PARAM F/U: HCPCS | Performed by: INTERNAL MEDICINE

## 2025-06-18 PROCEDURE — 1036F TOBACCO NON-USER: CPT | Performed by: INTERNAL MEDICINE

## 2025-06-18 PROCEDURE — 3074F SYST BP LT 130 MM HG: CPT | Performed by: INTERNAL MEDICINE

## 2025-06-18 PROCEDURE — 99214 OFFICE O/P EST MOD 30 MIN: CPT | Performed by: INTERNAL MEDICINE

## 2025-06-18 PROCEDURE — 1123F ACP DISCUSS/DSCN MKR DOCD: CPT | Performed by: INTERNAL MEDICINE

## 2025-06-18 RX ORDER — SILDENAFIL 100 MG/1
TABLET, FILM COATED ORAL
COMMUNITY
Start: 2025-06-09

## 2025-06-18 RX ORDER — SULFAMETHOXAZOLE AND TRIMETHOPRIM 800; 160 MG/1; MG/1
1 TABLET ORAL EVERY 12 HOURS
COMMUNITY
Start: 2025-06-13

## 2025-06-18 NOTE — PATIENT INSTRUCTIONS
Thank you for allowing us to care for you today!   We want to ensure we can follow your treatment plan and we strive to give you the best outcomes and experience possible.   If you ever have a life threatening emergency and call 911 - for an ambulance (EMS)  REMEMBER  Our providers can only care for you at:   [facility] or [facility]   Even if you have someone take you or you drive yourself we can only care for you in a [facility] facility. Our providers are not setup at the other healthcare locations!    PLEASE CALL OUR OFFICE DURING NORMAL BUSINESS HOURS  Monday through Friday 8 am to 5 pm  AFTER HOURS the physician on-call cannot help with scheduling, rescheduling, procedure instruction questions or any type of medication need or issue.  [facility] P:[phone] - [facility] P:[phone] - [facility] P:[phone]      If you receive a survey:  We would appreciate you taking the time to share your experience concerning your provider visit in the office.    These surveys are confidential!  We are eager to improve and are counting on you to share your feedback so we can ensure you get the best care possible.  CORONARY ARTERY DISEASE: Chest pain:Perfusion imaging reported to be normal but patient continues to C/O chest pain.                      ? Due to GERD or stress  3/15/2021   Normal EF 47 % with normal ventricular contractility. No infarct or ischemia   noted.   ECG portion of stress test is negative for ischemia by diagnostic criteria.   Normal stress myocardial perfusion.   This is a normal study.    EKG: NSR 68/min.    HTN:Yes  well controlled on current medical regimen, see list above.  - changes in  treatment:  no, on Lisinopril & Metoprolol.     VHD:no              No significant VHD noted  3/15/2021  Left ventricular function is normal, EF is estimated at 55-60%.   Mild left ventricular hypertrophy.   Grade I diastolic

## 2025-06-18 NOTE — PROGRESS NOTES
OFFICE PROGRESS NOTES      Mauricio is a 65 y.o. male who has    CHIEF COMPLAINT AS FOLLOWS:  CHEST PAIN:  Continues to C/O chest pain.   SOB: No C/O SOB at this time.                LEG EDEMA: No leg edema But complains of prominent leg, restless legs, nighttime cramping, aching and fatigue legs.   PALPITATIONS: Denies any C/O Palpitations   DIZZINESS: No C/O Dizziness   SYNCOPE: None   OTHER/ ADDITIONAL COMPLAINTS:                                     HPI: Patient is here for F/U on his CVI, HTN & Dyslipidemia problems.   CVI:Significant Reflux disease.   HTN: Patient has known essential HTN. Has been treated with guideline recommended medical / physical/ diet therapy as stated below.  Dyslipidemia: Patient has known mixed dyslipidemia. Has been treated with guideline recommended medical / physical/ diet therapy as stated below.                Current Outpatient Medications   Medication Sig Dispense Refill    sulfamethoxazole-trimethoprim (BACTRIM DS;SEPTRA DS) 800-160 MG per tablet Take 1 tablet by mouth in the morning and 1 tablet in the evening.      sildenafil (VIAGRA) 100 MG tablet Take by mouth      losartan-hydroCHLOROthiazide (HYZAAR) 100-12.5 MG per tablet Take 1 tablet by mouth daily 30 tablet 3    fluticasone (FLOVENT HFA) 110 MCG/ACT inhaler Inhale 2 puffs into the lungs 2 times daily 1 each 3    insulin glargine (LANTUS SOLOSTAR) 100 UNIT/ML injection pen Inject 50 Units into the skin 2 times daily 30 mL 3    Lancets MISC 1 each by Does not apply route 3 times daily 600 each 1    albuterol sulfate HFA (VENTOLIN HFA) 108 (90 Base) MCG/ACT inhaler Inhale 2 puffs into the lungs 4 times daily as needed for Wheezing 54 g 1    aspirin (ASPIRIN LOW DOSE) 81 MG chewable tablet CHEW 1 TABLET BY MOUTH DAILY 30 tablet 3    atorvastatin (LIPITOR) 40 MG tablet TAKE 1 TABLET BY MOUTH NIGHTLY 30 tablet 3    vitamin D (VITAMIN D3) 50 MCG (2000 UT) CAPS capsule Take 1 capsule by mouth daily 30 capsule 3    dicyclomine

## 2025-07-01 ENCOUNTER — TELEPHONE (OUTPATIENT)
Dept: CARDIOLOGY CLINIC | Age: 65
End: 2025-07-01

## 2025-07-20 DIAGNOSIS — M54.9 DORSALGIA: ICD-10-CM

## 2025-07-21 RX ORDER — GABAPENTIN 600 MG/1
600 TABLET ORAL 2 TIMES DAILY
Qty: 60 TABLET | Refills: 2 | Status: SHIPPED | OUTPATIENT
Start: 2025-07-21 | End: 2025-10-19

## 2025-07-25 ENCOUNTER — TELEPHONE (OUTPATIENT)
Dept: CARDIOLOGY CLINIC | Age: 65
End: 2025-07-25

## 2025-07-30 ENCOUNTER — TELEPHONE (OUTPATIENT)
Dept: CARDIOLOGY CLINIC | Age: 65
End: 2025-07-30

## 2025-07-30 NOTE — TELEPHONE ENCOUNTER
Called to advise of Venous results noted below     No evidence of deep vein or superficial vein thrombosis in the bilateral lower extremities. Vessels demonstrate normal compressibility, color filling, and phasic and spontaneous flow.    Significant venous reflux noted in the Right CFV (1.0s). The Right GSV Calf is too small to assess for venous reflux.    Significant venous reflux noted in the Left CFV (2.0s), GSV SFJ (1.4s), GSV Proximal Thigh (2.3s), GSV Mid Thigh (2.0s), GSV Knee (6.5s), and GSV Mid Calf (5.0s).    Varicose Veins noted in the Left lateral calf with significant reflux, however tortuous vessel makes it inaccessible for ablation at this time.    8/6/2025 9:30 AM Deyvi Sierra MD  Advise thigh high pressure stockings, 20 to 30 mm of Hg.  Keep feet elevated.  Increase walking.  Left message for call back. Provided phone number.

## 2025-08-01 NOTE — TELEPHONE ENCOUNTER
Called to advise of Venous results noted below     No evidence of deep vein or superficial vein thrombosis in the bilateral lower extremities. Vessels demonstrate normal compressibility, color filling, and phasic and spontaneous flow.    Significant venous reflux noted in the Right CFV (1.0s). The Right GSV Calf is too small to assess for venous reflux.    Significant venous reflux noted in the Left CFV (2.0s), GSV SFJ (1.4s), GSV Proximal Thigh (2.3s), GSV Mid Thigh (2.0s), GSV Knee (6.5s), and GSV Mid Calf (5.0s).    Varicose Veins noted in the Left lateral calf with significant reflux, however tortuous vessel makes it inaccessible for ablation at this time.    8/6/2025 9:30 AM Deyvi Sierra MD  Advise thigh high pressure stockings, 20 to 30 mm of Hg.  Keep feet elevated.  Increase walking.  Patient advised and voices understanding.

## 2025-08-06 ENCOUNTER — OFFICE VISIT (OUTPATIENT)
Dept: CARDIOLOGY CLINIC | Age: 65
End: 2025-08-06
Payer: MEDICARE

## 2025-08-06 VITALS
HEART RATE: 68 BPM | HEIGHT: 72 IN | BODY MASS INDEX: 38.47 KG/M2 | WEIGHT: 284 LBS | SYSTOLIC BLOOD PRESSURE: 130 MMHG | DIASTOLIC BLOOD PRESSURE: 82 MMHG

## 2025-08-06 DIAGNOSIS — E11.65 TYPE 2 DIABETES MELLITUS WITH HYPERGLYCEMIA, WITHOUT LONG-TERM CURRENT USE OF INSULIN (HCC): ICD-10-CM

## 2025-08-06 DIAGNOSIS — I10 ESSENTIAL HYPERTENSION: Primary | ICD-10-CM

## 2025-08-06 DIAGNOSIS — E78.2 MIXED HYPERLIPIDEMIA: ICD-10-CM

## 2025-08-06 DIAGNOSIS — I87.2 CHRONIC VENOUS INSUFFICIENCY: ICD-10-CM

## 2025-08-06 DIAGNOSIS — I83.893 VARICOSE VEINS OF BOTH LEGS WITH EDEMA: ICD-10-CM

## 2025-08-06 PROCEDURE — 3079F DIAST BP 80-89 MM HG: CPT | Performed by: INTERNAL MEDICINE

## 2025-08-06 PROCEDURE — 3075F SYST BP GE 130 - 139MM HG: CPT | Performed by: INTERNAL MEDICINE

## 2025-08-06 PROCEDURE — 3046F HEMOGLOBIN A1C LEVEL >9.0%: CPT | Performed by: INTERNAL MEDICINE

## 2025-08-06 PROCEDURE — 99214 OFFICE O/P EST MOD 30 MIN: CPT | Performed by: INTERNAL MEDICINE

## 2025-08-06 PROCEDURE — G8417 CALC BMI ABV UP PARAM F/U: HCPCS | Performed by: INTERNAL MEDICINE

## 2025-08-06 PROCEDURE — 2022F DILAT RTA XM EVC RTNOPTHY: CPT | Performed by: INTERNAL MEDICINE

## 2025-08-06 PROCEDURE — G8427 DOCREV CUR MEDS BY ELIG CLIN: HCPCS | Performed by: INTERNAL MEDICINE

## 2025-08-06 PROCEDURE — 3017F COLORECTAL CA SCREEN DOC REV: CPT | Performed by: INTERNAL MEDICINE

## 2025-08-06 PROCEDURE — 1123F ACP DISCUSS/DSCN MKR DOCD: CPT | Performed by: INTERNAL MEDICINE

## 2025-08-06 PROCEDURE — 1036F TOBACCO NON-USER: CPT | Performed by: INTERNAL MEDICINE

## 2025-08-07 ENCOUNTER — TELEPHONE (OUTPATIENT)
Dept: CARDIOLOGY CLINIC | Age: 65
End: 2025-08-07

## 2025-08-11 ENCOUNTER — OFFICE VISIT (OUTPATIENT)
Dept: CARDIOLOGY CLINIC | Age: 65
End: 2025-08-11
Payer: MEDICARE

## 2025-08-11 DIAGNOSIS — E11.65 TYPE 2 DIABETES MELLITUS WITH HYPERGLYCEMIA, WITHOUT LONG-TERM CURRENT USE OF INSULIN (HCC): ICD-10-CM

## 2025-08-11 DIAGNOSIS — E66.01 MORBID (SEVERE) OBESITY DUE TO EXCESS CALORIES (HCC): ICD-10-CM

## 2025-08-11 DIAGNOSIS — I83.893 VARICOSE VEINS OF BOTH LEGS WITH EDEMA: Primary | ICD-10-CM

## 2025-08-11 DIAGNOSIS — I10 ESSENTIAL HYPERTENSION: ICD-10-CM

## 2025-08-11 DIAGNOSIS — E78.2 MIXED HYPERLIPIDEMIA: ICD-10-CM

## 2025-08-11 PROCEDURE — 36482 ENDOVEN THER CHEM ADHES 1ST: CPT | Performed by: INTERNAL MEDICINE

## 2025-08-17 DIAGNOSIS — J30.9 ALLERGIC RHINITIS, UNSPECIFIED SEASONALITY, UNSPECIFIED TRIGGER: ICD-10-CM

## 2025-08-17 DIAGNOSIS — K21.9 GASTROESOPHAGEAL REFLUX DISEASE WITHOUT ESOPHAGITIS: ICD-10-CM

## 2025-08-17 DIAGNOSIS — K58.0 IRRITABLE BOWEL SYNDROME WITH DIARRHEA: ICD-10-CM

## 2025-08-18 RX ORDER — OMEPRAZOLE 40 MG/1
40 CAPSULE, DELAYED RELEASE ORAL DAILY
Qty: 30 CAPSULE | Refills: 3 | Status: SHIPPED | OUTPATIENT
Start: 2025-08-18

## 2025-08-18 RX ORDER — LORATADINE 10 MG/1
10 TABLET ORAL DAILY
Qty: 30 TABLET | Refills: 3 | Status: SHIPPED | OUTPATIENT
Start: 2025-08-18

## 2025-08-18 RX ORDER — DICYCLOMINE HYDROCHLORIDE 10 MG/1
CAPSULE ORAL
Qty: 120 CAPSULE | Refills: 3 | Status: SHIPPED | OUTPATIENT
Start: 2025-08-18

## 2025-09-02 DIAGNOSIS — E11.65 TYPE 2 DIABETES MELLITUS WITH HYPERGLYCEMIA, WITHOUT LONG-TERM CURRENT USE OF INSULIN (HCC): ICD-10-CM

## 2025-09-02 RX ORDER — METFORMIN HYDROCHLORIDE 500 MG/1
1000 TABLET, EXTENDED RELEASE ORAL 2 TIMES DAILY
Qty: 120 TABLET | Refills: 3 | Status: SHIPPED | OUTPATIENT
Start: 2025-09-02

## (undated) DEVICE — SUTURE COAT VCRL SZ 4-0 L18IN ABSRB UD L19MM PS-2 1/2 CIR J496G

## (undated) DEVICE — TROCAR: Brand: KII FIOS FIRST ENTRY

## (undated) DEVICE — MITT SURG PREP L ADH DISPOSABLE

## (undated) DEVICE — GLOVE SURG SZ 65 THK91MIL LTX FREE SYN POLYISOPRENE

## (undated) DEVICE — TUBING, SUCTION, 9/32" X 10', STRAIGHT: Brand: MEDLINE

## (undated) DEVICE — SOLUTION IV IRRIG WATER 1000ML POUR BRL 2F7114

## (undated) DEVICE — TROCAR: Brand: KII® SLEEVE

## (undated) DEVICE — TROCARS: Brand: KII® BALLOON BLUNT TIP SYSTEM

## (undated) DEVICE — APPLICATOR MEDICATED 26 CC SOLUTION HI LT ORNG CHLORAPREP

## (undated) DEVICE — APPLIER CLP M/L SHFT DIA5MM 15 LIG LIGAMAX 5

## (undated) DEVICE — BLADE CLIPPER GEN PURP NS

## (undated) DEVICE — AGENT HEMSTAT W2XL4IN OXIDIZED REGENERATED CELOS ABSRB SFT

## (undated) DEVICE — ELECTRODE ES AD CRDLSS PT RET REM POLYHESIVE

## (undated) DEVICE — SUTURE SZ 0 27IN 5/8 CIR UR-6  TAPER PT VIOLET ABSRB VICRYL J603H

## (undated) DEVICE — DRAPE SHEET ULTRAGARD: Brand: MEDLINE

## (undated) DEVICE — TUBING INSUFFLATOR HEAT HI FLO SET PNEUMOCLEAR

## (undated) DEVICE — BAG SPEC REM 224ML W4XL6IN DIA10MM 1 HND GYN DISP ENDOPCH

## (undated) DEVICE — Z INACTIVE USE 2735373 APPLICATOR FBR LAIN COT WOOD TIP ECONOMICAL

## (undated) DEVICE — SUTURE PROL SZ 2-0 L30IN NONABSORBABLE BLU L26MM SH 1/2 CIR 8833H

## (undated) DEVICE — AGENT HEMSTAT W4XL4IN OXIDIZED REGENERATED CELOS STRUCTURED

## (undated) DEVICE — ADHESIVE SKIN CLSR 0.7ML TOP DERMBND ADV